# Patient Record
Sex: FEMALE | Race: WHITE | NOT HISPANIC OR LATINO | Employment: UNEMPLOYED | URBAN - METROPOLITAN AREA
[De-identification: names, ages, dates, MRNs, and addresses within clinical notes are randomized per-mention and may not be internally consistent; named-entity substitution may affect disease eponyms.]

---

## 2019-10-29 ENCOUNTER — HOSPITAL ENCOUNTER (OUTPATIENT)
Facility: HOSPITAL | Age: 49
Setting detail: OBSERVATION
Discharge: OTHER INSTITUTION | End: 2019-10-30
Attending: EMERGENCY MEDICINE | Admitting: INTERNAL MEDICINE
Payer: COMMERCIAL

## 2019-10-29 ENCOUNTER — APPOINTMENT (OUTPATIENT)
Dept: RADIOLOGY | Facility: HOSPITAL | Age: 49
End: 2019-10-29
Payer: COMMERCIAL

## 2019-10-29 DIAGNOSIS — N39.0 UTI (URINARY TRACT INFECTION): ICD-10-CM

## 2019-10-29 DIAGNOSIS — R73.9 HYPERGLYCEMIA: Primary | ICD-10-CM

## 2019-10-29 DIAGNOSIS — K70.30 ALCOHOLIC CIRRHOSIS OF LIVER WITHOUT ASCITES (HCC): ICD-10-CM

## 2019-10-29 DIAGNOSIS — E72.20 HYPERAMMONEMIA (HCC): ICD-10-CM

## 2019-10-29 PROBLEM — R82.90 ABNORMAL URINALYSIS: Status: ACTIVE | Noted: 2019-10-29

## 2019-10-29 PROBLEM — F10.10 ALCOHOL ABUSE: Status: ACTIVE | Noted: 2019-10-29

## 2019-10-29 LAB
ALBUMIN SERPL BCP-MCNC: 2.6 G/DL (ref 3.5–5)
ALP SERPL-CCNC: 112 U/L (ref 46–116)
ALT SERPL W P-5'-P-CCNC: 37 U/L (ref 12–78)
AMMONIA PLAS-SCNC: 83 UMOL/L (ref 11–35)
ANION GAP SERPL CALCULATED.3IONS-SCNC: 7 MMOL/L (ref 4–13)
APTT PPP: 35 SECONDS (ref 23–37)
AST SERPL W P-5'-P-CCNC: 52 U/L (ref 5–45)
ATRIAL RATE: 76 BPM
BACTERIA UR QL AUTO: ABNORMAL /HPF
BASE EX.OXY STD BLDV CALC-SCNC: 96.4 % (ref 60–80)
BASE EXCESS BLDV CALC-SCNC: -2.1 MMOL/L
BASOPHILS # BLD AUTO: 0.01 THOUSANDS/ΜL (ref 0–0.1)
BASOPHILS NFR BLD AUTO: 0 % (ref 0–1)
BILIRUB SERPL-MCNC: 2.06 MG/DL (ref 0.2–1)
BILIRUB UR QL STRIP: NEGATIVE
BUN SERPL-MCNC: 12 MG/DL (ref 5–25)
CALCIUM SERPL-MCNC: 9 MG/DL (ref 8.3–10.1)
CHLORIDE SERPL-SCNC: 111 MMOL/L (ref 100–108)
CLARITY UR: ABNORMAL
CO2 SERPL-SCNC: 22 MMOL/L (ref 21–32)
COLOR UR: ABNORMAL
CREAT SERPL-MCNC: 0.67 MG/DL (ref 0.6–1.3)
EOSINOPHIL # BLD AUTO: 0.32 THOUSAND/ΜL (ref 0–0.61)
EOSINOPHIL NFR BLD AUTO: 6 % (ref 0–6)
ERYTHROCYTE [DISTWIDTH] IN BLOOD BY AUTOMATED COUNT: 13.3 % (ref 11.6–15.1)
GFR SERPL CREATININE-BSD FRML MDRD: 104 ML/MIN/1.73SQ M
GLUCOSE SERPL-MCNC: 160 MG/DL (ref 65–140)
GLUCOSE SERPL-MCNC: 206 MG/DL (ref 65–140)
GLUCOSE SERPL-MCNC: 245 MG/DL (ref 65–140)
GLUCOSE SERPL-MCNC: 54 MG/DL (ref 65–140)
GLUCOSE SERPL-MCNC: 99 MG/DL (ref 65–140)
GLUCOSE UR STRIP-MCNC: ABNORMAL MG/DL
HCO3 BLDV-SCNC: 20.5 MMOL/L (ref 24–30)
HCT VFR BLD AUTO: 35.1 % (ref 34.8–46.1)
HGB BLD-MCNC: 11.9 G/DL (ref 11.5–15.4)
HGB UR QL STRIP.AUTO: NEGATIVE
HYALINE CASTS #/AREA URNS LPF: ABNORMAL /LPF
IMM GRANULOCYTES # BLD AUTO: 0.03 THOUSAND/UL (ref 0–0.2)
IMM GRANULOCYTES NFR BLD AUTO: 1 % (ref 0–2)
INR PPP: 1.56 (ref 0.84–1.19)
KETONES UR STRIP-MCNC: NEGATIVE MG/DL
LEUKOCYTE ESTERASE UR QL STRIP: ABNORMAL
LIPASE SERPL-CCNC: 218 U/L (ref 73–393)
LYMPHOCYTES # BLD AUTO: 1.29 THOUSANDS/ΜL (ref 0.6–4.47)
LYMPHOCYTES NFR BLD AUTO: 22 % (ref 14–44)
MAGNESIUM SERPL-MCNC: 1.7 MG/DL (ref 1.6–2.6)
MCH RBC QN AUTO: 38.4 PG (ref 26.8–34.3)
MCHC RBC AUTO-ENTMCNC: 33.9 G/DL (ref 31.4–37.4)
MCV RBC AUTO: 113 FL (ref 82–98)
MONOCYTES # BLD AUTO: 0.65 THOUSAND/ΜL (ref 0.17–1.22)
MONOCYTES NFR BLD AUTO: 11 % (ref 4–12)
NEUTROPHILS # BLD AUTO: 3.48 THOUSANDS/ΜL (ref 1.85–7.62)
NEUTS SEG NFR BLD AUTO: 60 % (ref 43–75)
NITRITE UR QL STRIP: POSITIVE
NON-SQ EPI CELLS URNS QL MICRO: ABNORMAL /HPF
NRBC BLD AUTO-RTO: 0 /100 WBCS
O2 CT BLDV-SCNC: 17.3 ML/DL
P AXIS: 23 DEGREES
PCO2 BLDV: 28.8 MM HG (ref 42–50)
PH BLDV: 7.47 [PH] (ref 7.3–7.4)
PH UR STRIP.AUTO: 6 [PH]
PLATELET # BLD AUTO: 92 THOUSANDS/UL (ref 149–390)
PMV BLD AUTO: 10.4 FL (ref 8.9–12.7)
PO2 BLDV: 132.5 MM HG (ref 35–45)
POTASSIUM SERPL-SCNC: 3.6 MMOL/L (ref 3.5–5.3)
PR INTERVAL: 152 MS
PROT SERPL-MCNC: 7.3 G/DL (ref 6.4–8.2)
PROT UR STRIP-MCNC: NEGATIVE MG/DL
PROTHROMBIN TIME: 18.2 SECONDS (ref 11.6–14.5)
QRS AXIS: 35 DEGREES
QRSD INTERVAL: 86 MS
QT INTERVAL: 426 MS
QTC INTERVAL: 479 MS
RBC # BLD AUTO: 3.1 MILLION/UL (ref 3.81–5.12)
RBC #/AREA URNS AUTO: ABNORMAL /HPF
SODIUM SERPL-SCNC: 140 MMOL/L (ref 136–145)
SP GR UR STRIP.AUTO: 1.02 (ref 1–1.03)
T WAVE AXIS: 36 DEGREES
UROBILINOGEN UR QL STRIP.AUTO: 0.2 E.U./DL
VENTRICULAR RATE: 76 BPM
WBC # BLD AUTO: 5.78 THOUSAND/UL (ref 4.31–10.16)
WBC #/AREA URNS AUTO: ABNORMAL /HPF

## 2019-10-29 PROCEDURE — 82948 REAGENT STRIP/BLOOD GLUCOSE: CPT

## 2019-10-29 PROCEDURE — 83690 ASSAY OF LIPASE: CPT | Performed by: EMERGENCY MEDICINE

## 2019-10-29 PROCEDURE — 93010 ELECTROCARDIOGRAM REPORT: CPT | Performed by: INTERNAL MEDICINE

## 2019-10-29 PROCEDURE — 87186 SC STD MICRODIL/AGAR DIL: CPT | Performed by: PHYSICIAN ASSISTANT

## 2019-10-29 PROCEDURE — 99285 EMERGENCY DEPT VISIT HI MDM: CPT

## 2019-10-29 PROCEDURE — 85730 THROMBOPLASTIN TIME PARTIAL: CPT | Performed by: EMERGENCY MEDICINE

## 2019-10-29 PROCEDURE — 96365 THER/PROPH/DIAG IV INF INIT: CPT

## 2019-10-29 PROCEDURE — 82805 BLOOD GASES W/O2 SATURATION: CPT | Performed by: EMERGENCY MEDICINE

## 2019-10-29 PROCEDURE — 96375 TX/PRO/DX INJ NEW DRUG ADDON: CPT

## 2019-10-29 PROCEDURE — 87077 CULTURE AEROBIC IDENTIFY: CPT | Performed by: PHYSICIAN ASSISTANT

## 2019-10-29 PROCEDURE — 81001 URINALYSIS AUTO W/SCOPE: CPT | Performed by: EMERGENCY MEDICINE

## 2019-10-29 PROCEDURE — 99285 EMERGENCY DEPT VISIT HI MDM: CPT | Performed by: EMERGENCY MEDICINE

## 2019-10-29 PROCEDURE — 87086 URINE CULTURE/COLONY COUNT: CPT | Performed by: PHYSICIAN ASSISTANT

## 2019-10-29 PROCEDURE — 36415 COLL VENOUS BLD VENIPUNCTURE: CPT | Performed by: EMERGENCY MEDICINE

## 2019-10-29 PROCEDURE — 85610 PROTHROMBIN TIME: CPT | Performed by: EMERGENCY MEDICINE

## 2019-10-29 PROCEDURE — 96366 THER/PROPH/DIAG IV INF ADDON: CPT

## 2019-10-29 PROCEDURE — 99220 PR INITIAL OBSERVATION CARE/DAY 70 MINUTES: CPT | Performed by: INTERNAL MEDICINE

## 2019-10-29 PROCEDURE — 76705 ECHO EXAM OF ABDOMEN: CPT

## 2019-10-29 PROCEDURE — 82140 ASSAY OF AMMONIA: CPT | Performed by: EMERGENCY MEDICINE

## 2019-10-29 PROCEDURE — 85025 COMPLETE CBC W/AUTO DIFF WBC: CPT | Performed by: EMERGENCY MEDICINE

## 2019-10-29 PROCEDURE — 83735 ASSAY OF MAGNESIUM: CPT | Performed by: EMERGENCY MEDICINE

## 2019-10-29 PROCEDURE — 80053 COMPREHEN METABOLIC PANEL: CPT | Performed by: EMERGENCY MEDICINE

## 2019-10-29 PROCEDURE — 93005 ELECTROCARDIOGRAM TRACING: CPT

## 2019-10-29 RX ORDER — CALCIUM CARBONATE 200(500)MG
1000 TABLET,CHEWABLE ORAL DAILY PRN
Status: DISCONTINUED | OUTPATIENT
Start: 2019-10-29 | End: 2019-10-30 | Stop reason: HOSPADM

## 2019-10-29 RX ORDER — FUROSEMIDE 20 MG/1
60 TABLET ORAL 2 TIMES DAILY
COMMUNITY
Start: 2019-08-27 | End: 2021-03-09 | Stop reason: HOSPADM

## 2019-10-29 RX ORDER — SODIUM CHLORIDE, SODIUM GLUCONATE, SODIUM ACETATE, POTASSIUM CHLORIDE, MAGNESIUM CHLORIDE, SODIUM PHOSPHATE, DIBASIC, AND POTASSIUM PHOSPHATE .53; .5; .37; .037; .03; .012; .00082 G/100ML; G/100ML; G/100ML; G/100ML; G/100ML; G/100ML; G/100ML
1000 INJECTION, SOLUTION INTRAVENOUS ONCE
Status: COMPLETED | OUTPATIENT
Start: 2019-10-29 | End: 2019-10-29

## 2019-10-29 RX ORDER — LANOLIN ALCOHOL/MO/W.PET/CERES
400 CREAM (GRAM) TOPICAL DAILY
Status: DISCONTINUED | OUTPATIENT
Start: 2019-10-30 | End: 2019-10-30 | Stop reason: HOSPADM

## 2019-10-29 RX ORDER — FERROUS SULFATE 325(65) MG
325 TABLET ORAL
Status: DISCONTINUED | OUTPATIENT
Start: 2019-10-30 | End: 2019-10-30 | Stop reason: HOSPADM

## 2019-10-29 RX ORDER — LANOLIN ALCOHOL/MO/W.PET/CERES
100 CREAM (GRAM) TOPICAL DAILY
COMMUNITY

## 2019-10-29 RX ORDER — THIAMINE MONONITRATE (VIT B1) 100 MG
100 TABLET ORAL DAILY
Status: DISCONTINUED | OUTPATIENT
Start: 2019-10-30 | End: 2019-10-30 | Stop reason: HOSPADM

## 2019-10-29 RX ORDER — FOLIC ACID 1 MG/1
400 TABLET ORAL DAILY
COMMUNITY

## 2019-10-29 RX ORDER — MIRTAZAPINE 30 MG/1
30 TABLET, FILM COATED ORAL
COMMUNITY
Start: 2019-09-25 | End: 2021-04-28

## 2019-10-29 RX ORDER — METHOCARBAMOL 500 MG/1
500 TABLET, FILM COATED ORAL
Status: DISCONTINUED | OUTPATIENT
Start: 2019-10-29 | End: 2019-10-30 | Stop reason: HOSPADM

## 2019-10-29 RX ORDER — LACTULOSE 20 G/30ML
30 SOLUTION ORAL ONCE
Status: COMPLETED | OUTPATIENT
Start: 2019-10-29 | End: 2019-10-29

## 2019-10-29 RX ORDER — METHOCARBAMOL 500 MG/1
750 TABLET, FILM COATED ORAL
COMMUNITY

## 2019-10-29 RX ORDER — SENNOSIDES 8.6 MG
1 TABLET ORAL DAILY
Status: DISCONTINUED | OUTPATIENT
Start: 2019-10-30 | End: 2019-10-30 | Stop reason: HOSPADM

## 2019-10-29 RX ORDER — MIRTAZAPINE 30 MG/1
30 TABLET, FILM COATED ORAL
Status: DISCONTINUED | OUTPATIENT
Start: 2019-10-29 | End: 2019-10-30 | Stop reason: HOSPADM

## 2019-10-29 RX ORDER — FUROSEMIDE 40 MG/1
40 TABLET ORAL DAILY
Status: DISCONTINUED | OUTPATIENT
Start: 2019-10-30 | End: 2019-10-30 | Stop reason: HOSPADM

## 2019-10-29 RX ORDER — FERROUS SULFATE 325(65) MG
325 TABLET ORAL
COMMUNITY

## 2019-10-29 RX ORDER — ONDANSETRON 2 MG/ML
4 INJECTION INTRAMUSCULAR; INTRAVENOUS EVERY 6 HOURS PRN
Status: DISCONTINUED | OUTPATIENT
Start: 2019-10-29 | End: 2019-10-30 | Stop reason: HOSPADM

## 2019-10-29 RX ORDER — SPIRONOLACTONE 25 MG/1
25 TABLET ORAL DAILY
Status: DISCONTINUED | OUTPATIENT
Start: 2019-10-30 | End: 2019-10-30 | Stop reason: HOSPADM

## 2019-10-29 RX ORDER — SPIRONOLACTONE 25 MG/1
150 TABLET ORAL DAILY
COMMUNITY
Start: 2019-08-27 | End: 2021-03-09 | Stop reason: HOSPADM

## 2019-10-29 RX ADMIN — METHOCARBAMOL TABLETS 500 MG: 500 TABLET, COATED ORAL at 22:27

## 2019-10-29 RX ADMIN — CEFTRIAXONE SODIUM 1000 MG: 10 INJECTION, POWDER, FOR SOLUTION INTRAVENOUS at 14:45

## 2019-10-29 RX ADMIN — SODIUM CHLORIDE, SODIUM GLUCONATE, SODIUM ACETATE, POTASSIUM CHLORIDE AND MAGNESIUM CHLORIDE 1000 ML: 526; 502; 368; 37; 30 INJECTION, SOLUTION INTRAVENOUS at 12:41

## 2019-10-29 RX ADMIN — LACTULOSE 30 G: 10 SOLUTION ORAL at 15:17

## 2019-10-29 RX ADMIN — MIRTAZAPINE 30 MG: 30 TABLET, FILM COATED ORAL at 22:24

## 2019-10-29 NOTE — ED ATTENDING ATTESTATION
10/29/2019  IZamzam MD, saw and evaluated the patient  I have discussed the patient with the resident/non-physician practitioner and agree with the resident's/non-physician practitioner's findings, Plan of Care, and MDM as documented in the resident's/non-physician practitioner's note, except where noted  All available labs and Radiology studies were reviewed  I was present for key portions of any procedure(s) performed by the resident/non-physician practitioner and I was immediately available to provide assistance  At this point I agree with the current assessment done in the Emergency Department  I have conducted an independent evaluation of this patient a history and physical is as follows: This is a 51 y o  old female who presents to the ED for evaluation of high blood sugar  [place]  Has some HA, flashes in vision and lightheadedness  Hx chronic alcohol use, multiple pints per day, with cirrhosis  VS and nursing notes reviewed  General: Appears in NAD, awake, alert, speaking normally in full sentences  Well-nourished, well-developed  Appears stated age  Head: Normocephalic, atraumatic  Eyes: EOMI  Vision grossly normal  No subconjunctival hemorrhages or occular discharge noted  Symmetrical lids  ENT: Atraumatic external nose and ears  No stridor  Normal phonation  No drooling  Normal swallowing  Neck: No JVD  FROM  No goiter  CV: No pallor  Normal rate  Lungs: No tachypnea  No respiratory distress  MSK: Moving all extremities equally, no peripheral edema  Skin: Dry, intact  No cyanosis  Neuro: Awake, alert, GCS15  CN II-XII grossly intact  Grossly normal gait  Psychiatric/Behavioral: Appropriate mood and affect  A/P: This is a 51 y o  female who presents to the ED for evaluation of hyperglycemia, dizziness  Will get labs, IVF, reevaluate      ED Course       Critical Care Time  Procedures

## 2019-10-29 NOTE — PLAN OF CARE
Problem: PAIN - ADULT  Goal: Verbalizes/displays adequate comfort level or baseline comfort level  Description  Interventions:  - Encourage patient to monitor pain and request assistance  - Assess pain using appropriate pain scale  - Administer analgesics based on type and severity of pain and evaluate response  - Implement non-pharmacological measures as appropriate and evaluate response  - Consider cultural and social influences on pain and pain management  - Notify physician/advanced practitioner if interventions unsuccessful or patient reports new pain  Outcome: Progressing     Problem: INFECTION - ADULT  Goal: Absence or prevention of progression during hospitalization  Description  INTERVENTIONS:  - Assess and monitor for signs and symptoms of infection  - Monitor lab/diagnostic results  - Monitor all insertion sites, i e  indwelling lines, tubes, and drains  - Monitor endotracheal if appropriate and nasal secretions for changes in amount and color  - [place] appropriate cooling/warming therapies per order  - Administer medications as ordered  - Instruct and encourage patient and family to use good hand hygiene technique  - Identify and instruct in appropriate isolation precautions for identified infection/condition  Outcome: Progressing  Goal: Absence of fever/infection during neutropenic period  Description  INTERVENTIONS:  - Monitor WBC    Outcome: Progressing     Problem: SAFETY ADULT  Goal: Patient will remain free of falls  Description  INTERVENTIONS:  - Assess patient frequently for physical needs  -  Identify cognitive and physical deficits and behaviors that affect risk of falls    -  [place] fall precautions as indicated by assessment   - Educate patient/family on patient safety including physical limitations  - Instruct patient to call for assistance with activity based on assessment  - Modify environment to reduce risk of injury  - Consider OT/PT consult to assist with strengthening/mobility  Outcome: Progressing  Goal: Maintain or return to baseline ADL function  Description  INTERVENTIONS:  -  Assess patient's ability to carry out ADLs; assess patient's baseline for ADL function and identify physical deficits which impact ability to perform ADLs (bathing, care of mouth/teeth, toileting, grooming, dressing, etc )  - Assess/evaluate cause of self-care deficits   - Assess range of motion  - Assess patient's mobility; develop plan if impaired  - Assess patient's need for assistive devices and provide as appropriate  - Encourage maximum independence but intervene and supervise when necessary  - Involve family in performance of ADLs  - Assess for home care needs following discharge   - Consider OT consult to assist with ADL evaluation and planning for discharge  - Provide patient education as appropriate  Outcome: Progressing  Goal: Maintain or return mobility status to optimal level  Description  INTERVENTIONS:  - Assess patient's baseline mobility status (ambulation, transfers, stairs, etc )    - Identify cognitive and physical deficits and behaviors that affect mobility  - Identify mobility aids required to assist with transfers and/or ambulation (gait belt, sit-to-stand, lift, walker, cane, etc )  - [place] fall precautions as indicated by assessment  - Record patient progress and toleration of activity level on Mobility SBAR; progress patient to next Phase/Stage  - Instruct patient to call for assistance with activity based on assessment  - Consider rehabilitation consult to assist with strengthening/weightbearing, etc   Outcome: Progressing     Problem: DISCHARGE PLANNING  Goal: Discharge to home or other facility with appropriate resources  Description  INTERVENTIONS:  - Identify barriers to discharge w/patient and caregiver  - Arrange for needed discharge resources and transportation as appropriate  - Identify discharge learning needs (meds, wound care, etc )  - Arrange for interpretive services to assist at discharge as needed  - Refer to Case Management Department for coordinating discharge planning if the patient needs post-hospital services based on physician/advanced practitioner order or complex needs related to functional status, cognitive ability, or social support system  Outcome: Progressing     Problem: Knowledge Deficit  Goal: Patient/family/caregiver demonstrates understanding of disease process, treatment plan, medications, and discharge instructions  Description  Complete learning assessment and assess knowledge base    Interventions:  - Provide teaching at level of understanding  - Provide teaching via preferred learning methods  Outcome: Progressing

## 2019-10-29 NOTE — ED PROVIDER NOTES
History  Chief Complaint   Patient presents with    Hyperglycemia - Symptomatic     pt is coming from [facility] where she is 28days sober from ETOH  to cope with not drinking she has been eating a lot of high sugar foods  this am the pt felt fatigue and lightheaded, near syncope  staff checked her sugar and it was 372 at 0830  no hx of DM    Fatigue     50-year-old woman with past medical history of alcoholic cirrhosis presents for evaluation of hyperglycemia  Patient is currently residing in an alcohol rehab  She was found to be hyper for glycemic by routine fingerstick checked by staff  Her sugar was as high as 372  Patient is asymptomatic exception of fatigue  She states she has been eating more sweets and junk food since she stopped drinking  She denies prior history of diabetes  She denies fever, chills, nausea, vomiting, diarrhea, dysuria, urinary frequency, urgency  She still complains of intermittent in unsteadiness when ambulating in turning her head  She has no symptoms of dizziness or vertigo  She denies chest pain, abdominal pain, dyspnea  Prior to Admission Medications   Prescriptions Last Dose Informant Patient Reported?  Taking?   ferrous sulfate 325 (65 Fe) mg tablet   Yes Yes   Sig: Take 325 mg by mouth daily with breakfast   folic acid (FOLVITE) 1 mg tablet   Yes Yes   Sig: Take 400 mcg by mouth daily   furosemide (LASIX) 20 mg tablet   Yes Yes   Sig: Take 40 mg by mouth daily   methocarbamol (ROBAXIN) 500 mg tablet   Yes Yes   Sig: Take 500 mg by mouth daily at bedtime as needed for muscle spasms   mirtazapine (REMERON) 30 mg tablet   Yes Yes   Sig: Take 30 mg by mouth daily at bedtime   spironolactone (ALDACTONE) 25 mg tablet   Yes Yes   Sig: Take 25 mg by mouth daily   thiamine 100 MG tablet   Yes Yes   Sig: Take 100 mg by mouth daily      Facility-Administered Medications: None       Past Medical History:   Diagnosis Date    Alcohol abuse     Cirrhosis ([facility] [address] )     Hypertension        History reviewed  No pertinent surgical history  History reviewed  No pertinent family history  I have reviewed and agree with the history as documented  Social History     Tobacco Use    Smoking status: Current Every Day Smoker     Types: Cigarettes    Smokeless tobacco: Never Used   Substance Use Topics    Alcohol use: Not Currently     Comment: currently 28 days sober    Drug use: Never        Review of Systems   Constitutional: Positive for fatigue  Negative for appetite change, chills, diaphoresis and fever  HENT: Negative for congestion, rhinorrhea and sore throat  Respiratory: Negative for cough, shortness of breath, wheezing and stridor  Cardiovascular: Negative for chest pain, palpitations and leg swelling  Gastrointestinal: Negative for abdominal distention, abdominal pain, constipation, diarrhea, nausea and vomiting  Endocrine: Negative for polydipsia and polyuria  Genitourinary: Negative for dysuria and hematuria  Musculoskeletal: Negative for back pain, neck pain and neck stiffness  Skin: Negative for pallor, rash and wound  Neurological: Positive for dizziness  Negative for light-headedness and headaches  Psychiatric/Behavioral: Negative for behavioral problems and confusion  All other systems reviewed and are negative        Physical Exam  ED Triage Vitals   Temperature Pulse Respirations Blood Pressure SpO2   10/29/19 1037 10/29/19 1037 10/29/19 1037 10/29/19 1037 10/29/19 1037   98 5 °F (36 9 °C) 81 18 165/83 97 %      Temp Source Heart Rate Source Patient Position - Orthostatic VS BP Location FiO2 (%)   10/29/19 1037 10/29/19 1203 10/29/19 1203 10/29/19 1203 --   Oral Monitor Sitting Right arm       Pain Score       10/29/19 1037       No Pain             Orthostatic Vital Signs  Vitals:    10/29/19 2358 10/30/19 0728 10/30/19 0729 10/30/19 1521   BP: 130/83 129/83 129/83 129/82   Pulse: 85 82 83 81   Patient Position - Orthostatic VS: Physical Exam   Constitutional: She is oriented to person, place, and time  She appears well-developed and well-nourished  No distress  HENT:   Head: Normocephalic and atraumatic  Eyes: Pupils are equal, round, and reactive to light  Conjunctivae and EOM are normal  No scleral icterus  Two beats of stagnant with rightward gaze   Neck: Normal range of motion  Neck supple  Cardiovascular: Normal rate, regular rhythm, normal heart sounds and intact distal pulses  Exam reveals no gallop and no friction rub  No murmur heard  Pulmonary/Chest: Effort normal and breath sounds normal  No respiratory distress  She has no wheezes  Abdominal: Soft  Bowel sounds are normal  She exhibits distension  She exhibits no mass  There is no tenderness  There is no rebound and no guarding  Ascites  No ecchymoses, erythema, increased warmth of abdomen   Musculoskeletal: Normal range of motion  She exhibits no edema, tenderness or deformity  Neurological: She is alert and oriented to person, place, and time  Skin: Skin is warm and dry  No rash noted  She is not diaphoretic  No erythema  No pallor  jaundice   Psychiatric: She has a normal mood and affect  Her behavior is normal    Nursing note and vitals reviewed        ED Medications  Medications   multi-electrolyte (ISOLYTE-S PH 7 4) bolus 1,000 mL (0 mL Intravenous Stopped 10/29/19 1436)   ceftriaxone (ROCEPHIN) 1 g/50 mL in dextrose IVPB (0 mg Intravenous Stopped 10/29/19 1518)   lactulose 20 g/30 mL oral solution 30 g (30 g Oral Given 10/29/19 1517)       Diagnostic Studies  Results Reviewed     Procedure Component Value Units Date/Time    Urine culture [[id number]]  (Abnormal)  (Susceptibility) Collected:  10/29/19 1241    Lab Status:  Final result Specimen:  Urine, Clean Catch Updated:  10/31/19 4732     Urine Culture >100,000 cfu/ml Escherichia coli    Susceptibility     Escherichia coli (1)     Antibiotic Interpretation Microscan Method Status    ZID Performed  Yes  CESAR Final    Amikacin ($$) Susceptible <=16 ug/ml CESAR Final    Amoxicillin + Clavulanate Intermediate 16/8 ug/ml CESAR Final    Ampicillin ($$) Resistant >16 00 ug/ml CESAR Final    Ampicillin + Sulbactam ($) Resistant >16/8 ug/ml CESAR Final    Aztreonam ($$$)  Susceptible <=4 ug/ml CESAR Final    Cefazolin ($) Resistant >16 00 ug/ml CESAR Final    Cefuroxime ($$) Susceptible <=4 ug/ml CESAR Final    Ciprofloxacin ($)  Susceptible <=1 00 ug/ml CESAR Final    Ertapenem ($$$) Susceptible <=0 5 ug/ml CESAR Final    Gentamicin ($$) Resistant >8 ug/ml CESAR Final    Levofloxacin ($) Susceptible <=0 25 ug/ml CESAR Final    Tetracycline Resistant >8 ug/ml CESAR Final    Tobramycin ($) Susceptible 4 ug/ml CESAR Final    Trimethoprim + Sulfamethoxazole ($$$) Resistant >2/38 ug/ml CESAR Final                   Fingerstick Glucose (POCT) [[id number]]  (Normal) Collected:  10/29/19 1544    Lab Status:  Final result Updated:  10/29/19 1546     POC Glucose 99 mg/dl     Fingerstick Glucose (POCT) [[id number]]  (Abnormal) Collected:  10/29/19 1511    Lab Status:  Final result Updated:  10/29/19 1516     POC Glucose 54 mg/dl     CBC and differential [[id number]]  (Abnormal) Collected:  10/29/19 1238    Lab Status:  Final result Specimen:  Blood from Arm, Left Updated:  10/29/19 1323     WBC 5 78 Thousand/uL      RBC 3 10 Million/uL      Hemoglobin 11 9 g/dL      Hematocrit 35 1 %       fL      MCH 38 4 pg      MCHC 33 9 g/dL      RDW 13 3 %      MPV 10 4 fL      Platelets 92 Thousands/uL      nRBC 0 /100 WBCs      Neutrophils Relative 60 %      Immat GRANS % 1 %      Lymphocytes Relative 22 %      Monocytes Relative 11 %      Eosinophils Relative 6 %      Basophils Relative 0 %      Neutrophils Absolute 3 48 Thousands/µL      Immature Grans Absolute 0 03 Thousand/uL      Lymphocytes Absolute 1 29 Thousands/µL      Monocytes Absolute 0 65 Thousand/µL      Eosinophils Absolute 0 32 Thousand/µL      Basophils Absolute 0 01 Thousands/µL Urine Microscopic [[id number]]  (Abnormal) Collected:  10/29/19 1241    Lab Status:  Final result Specimen:  Urine, Clean Catch Updated:  10/29/19 1315     RBC, UA 4-10 /hpf      WBC, UA Innumerable /hpf      Epithelial Cells None Seen /hpf      Bacteria, UA Innumerable /hpf      Hyaline Casts, UA 3-5 /lpf     Comprehensive metabolic panel [[id number]]  (Abnormal) Collected:  10/29/19 1237    Lab Status:  Final result Specimen:  Blood from Arm, Left Updated:  10/29/19 1306     Sodium 140 mmol/L      Potassium 3 6 mmol/L      Chloride 111 mmol/L      CO2 22 mmol/L      ANION GAP 7 mmol/L      BUN 12 mg/dL      Creatinine 0 67 mg/dL      Glucose 206 mg/dL      Calcium 9 0 mg/dL      AST 52 U/L      ALT 37 U/L      Alkaline Phosphatase 112 U/L      Total Protein 7 3 g/dL      Albumin 2 6 g/dL      Total Bilirubin 2 06 mg/dL      eGFR 104 ml/min/1 73sq m     Narrative:       [place] guidelines for Chronic Kidney Disease (CKD):     Stage 1 with normal or high GFR (GFR > 90 mL/min/1 73 square meters)    Stage 2 Mild CKD (GFR = 60-89 mL/min/1 73 square meters)    Stage 3A Moderate CKD (GFR = 45-59 mL/min/1 73 square meters)    Stage 3B Moderate CKD (GFR = 30-44 mL/min/1 73 square meters)    Stage 4 Severe CKD (GFR = 15-29 mL/min/1 73 square meters)    Stage 5 End Stage CKD (GFR <15 mL/min/1 73 square meters)  Note: GFR calculation is accurate only with a steady state creatinine    Lipase [[id number]]  (Normal) Collected:  10/29/19 1237    Lab Status:  Final result Specimen:  Blood from Arm, Left Updated:  10/29/19 1306     Lipase 218 u/L     Magnesium [[id number]]  (Normal) Collected:  10/29/19 1237    Lab Status:  Final result Specimen:  Blood from Arm, Left Updated:  10/29/19 1306     Magnesium 1 7 mg/dL     Ammonia [[id number]]  (Abnormal) Collected:  10/29/19 1238    Lab Status:  Final result Specimen:  Blood from Arm, Left Updated:  10/29/19 1303     Ammonia 83 umol/L     UA w Reflex to Microscopic [[id number]]  (Abnormal) Collected:  10/29/19 1241    Lab Status:  Final result Specimen:  Urine, Clean Catch Updated:  10/29/19 1302     Color, UA Dk Yellow     Clarity, UA Cloudy     Specific Gravity, UA 1 025     pH, UA 6 0     Leukocytes, UA Small     Nitrite, UA Positive     Protein, UA Negative mg/dl      Glucose, UA >=1000 (1%) mg/dl      Ketones, UA Negative mg/dl      Urobilinogen, UA 0 2 E U /dl      Bilirubin, UA Negative     Blood, UA Negative    Protime-INR [[id number]]  (Abnormal) Collected:  10/29/19 1237    Lab Status:  Final result Specimen:  Blood from Arm, Left Updated:  10/29/19 1301     Protime 18 2 seconds      INR 1 56    APTT [[id number]]  (Normal) Collected:  10/29/19 1237    Lab Status:  Final result Specimen:  Blood from Arm, Left Updated:  10/29/19 1301     PTT 35 seconds     Blood gas, venous [[id number]]  (Abnormal) Collected:  10/29/19 1238    Lab Status:  Final result Specimen:  Blood from Arm, Left Updated:  10/29/19 1245     pH, Mika 7 470     pCO2, Mika 28 8 mm Hg      pO2, Mika 132 5 mm Hg      HCO3, Mika 20 5 mmol/L      Base Excess, Mika -2 1 mmol/L      O2 Content, Mika 17 3 ml/dL      O2 HGB, VENOUS 96 4 %     Fingerstick Glucose (POCT) [[id number]]  (Abnormal) Collected:  10/29/19 1201    Lab Status:  Final result Updated:  10/29/19 1203     POC Glucose 245 mg/dl                  US abdomen limited   Final Result by Darlyn Lutz MD (10/30 1238)      No evidence of ascites  Heterogeneous liver could be related to intrinsic liver disease as suggested by the history  Possible recannulated umbilical vein which can be associated with portal venous hypertension              Workstation performed: [id number]               Procedures  ECG 12 Lead Documentation Only  Date/Time: 10/29/2019 1:55 PM  Performed by: John Martinez MD  Authorized by: John Martinez MD     Indications / Diagnosis:  Dizziness  ECG reviewed by me, the ED Provider: yes    Patient location:  ED  Interpretation:     Interpretation: normal    Rate:     ECG rate:  76    ECG rate assessment: normal    Rhythm:     Rhythm: sinus rhythm    Ectopy:     Ectopy: none    QRS:     QRS axis:  Normal    QRS intervals:  Normal  Conduction:     Conduction: normal    ST segments:     ST segments:  Normal            ED Course  ED Course as of Oct 31 1010   Tue Oct 29, 2019   1547 POC Glucose: 99   1547 POC Glucose: 99                               MDM  Number of Diagnoses or Management Options  Hyperammonemia ([address] ): new and requires workup  Hyperglycemia: new and requires workup  UTI (urinary tract infection): new and requires workup  Diagnosis management comments: 52 yo woman with a omhx of etoh cirrhosis presents with hyperglycemia  Pt also complains of intermittent lightheadedness  Jaundice & abd distension w/o tenderness on exam  Initial BG >200  Will check cbc, metabolic panel, lfts, ammonia, ua, cardiac w/u  Reassess  Labs show hyperammonemia  Likely etiology of dizziness, fatigue  Patient had an episode of hypoglycemia likely 2/2 decreased po intake during workup in the setting of liver failure  This resolved with juice & small meal  Will start pt on lactulose, rocephin  Admit to SLIM                 Amount and/or Complexity of Data Reviewed  Clinical lab tests: ordered and reviewed  Tests in the radiology section of CPT®: ordered and reviewed  Decide to obtain previous medical records or to obtain history from someone other than the patient: yes  Obtain history from someone other than the patient: yes  Review and summarize past medical records: yes  Discuss the patient with other providers: yes  Independent visualization of images, tracings, or specimens: yes    Risk of Complications, Morbidity, and/or Mortality  Presenting problems: moderate  Diagnostic procedures: low  Management options: low    Patient Progress  Patient progress: stable      Disposition  Final diagnoses:   Hyperglycemia Hyperammonemia ([facility] [address] )   UTI (urinary tract infection)     Time reflects when diagnosis was documented in both MDM as applicable and the Disposition within this note     Time User Action Codes Description Comment    10/29/2019  2:46 PM Nayana Donahue Add [R73 9] Hyperglycemia     10/29/2019  2:47 PM Cavanaugh, [address] [place] [address] [E72 20] Hyperammonemia ([facility] [address] )     10/29/2019  2:49 PM Chelo Cavanaugh Add [N39 0] UTI (urinary tract infection)     10/30/2019  2:54 PM Jono Apgar Add [C61 69] Alcoholic cirrhosis of liver without ascites [facility])       ED Disposition     ED Disposition Condition Date/Time Comment    Admit Stable Tue Oct 29, 2019  2:47 PM Case was discussed with fernie and the patient's admission status was agreed to be Admission Status: observation status to the service of Dr Mata Daily          Follow-up Information     Follow up With Specialties Details Why [address] [place] [address],  Family Medicine Schedule an appointment as soon as possible for a visit in 2 week(s)  [address]  [phone]            Discharge Medication List as of 10/30/2019  3:23 PM      START taking these medications    Details   cephalexin (KEFLEX) 500 mg capsule Take 1 capsule (500 mg total) by mouth every 8 (eight) hours for 8 doses, Starting Wed 10/30/2019, Until Sat 2019, Print      lactulose (CEPHULAC) 20 g packet Take 1 packet (20 g total) by mouth daily, Starting Wed 10/30/2019, Print         CONTINUE these medications which have NOT CHANGED    Details   ferrous sulfate 325 (65 Fe) mg tablet Take 325 mg by mouth daily with breakfast, Historical Med      folic acid (FOLVITE) 1 mg tablet Take 400 mcg by mouth daily, Historical Med      furosemide (LASIX) 20 mg tablet Take 40 mg by mouth daily, Starting 2019, Until 2020, Historical Med      methocarbamol (ROBAXIN) 500 mg tablet Take 500 mg by mouth daily at bedtime as needed for muscle spasms, Historical Med mirtazapine (REMERON) 30 mg tablet Take 30 mg by mouth daily at bedtime, Starting Wed 9/25/2019, Historical Med      spironolactone (ALDACTONE) 25 mg tablet Take 25 mg by mouth daily, Starting Tue 8/27/2019, Until Wed 8/26/2020, Historical Med      thiamine 100 MG tablet Take 100 mg by mouth daily, Historical Med           Outpatient Discharge Orders   Discharge Diet     Discharge Condtion:  Stabilized     Patient Aware of Diagnosis: Yes     Free of Communicable Disease:   Yes       ED Provider  Attending physically available and evaluated Karne Steele  CALLIE managed the patient along with the ED Attending      Electronically Signed by         Frank Bean MD  10/31/19 9813

## 2019-10-29 NOTE — H&P
[address] [facility]  H&P- Jennifer Miranda 1970, 51 y o  female MRN: [id number]    Unit/Bed#: [facility] 822-01 Encounter: [id number]    Primary Care Provider: Ulises Yoder DO   Date and time admitted to hospital: 10/29/2019 10:27 AM      * Hyperglycemia  Assessment & Plan  · Pt sent from [facility] with hyperglycemia glucose in 300s  Here in 200s in ED and most recent glucose hypoglycemic in 50s  · Pt reports she has been eating sweets while in rehab and put sugar on her cereal this morning  · Check Hgb A1c  · Accu checks QID    Abnormal urinalysis  Assessment & Plan  · Abnormal UA on admission and given IV ceftriaxone in ED  · Urine culture is pending  · Pt is asymptomatic, denies dysuria, hematuria  Has frequency however chronic; she is on diuretics  · Afebrile, no leukocytosis  Monitor off any further antibiotic     Alcohol abuse  Assessment & Plan  · Pt with hx alcohol abuse and cirrhosis  Currently at [facility] run rehab  · CM consult    Alcoholic cirrhosis [facility])  Assessment & Plan  · Continue aldactone  · Ammonia elevated at 83 - given lactulose in ED  · Check abdominal US as pt notes increasing distention evaluate for ascites  She notes she has had a paracentesis in the past  · F/u GI outpatient - she has an appt next week reportedly       VTE Prophylaxis: low risk ambulate pt  / reason for no mechanical VTE prophylaxis ambulate pt   Code Status: level 1 full  POLST: There is no POLST form on file for this patient (pre-hospital)  Discussion with family: pt    Anticipated Length of Stay:  Patient will be admitted on an Observation basis with an anticipated length of stay of  Less than 2 midnights  Justification for Hospital Stay: hyperglycemia    Total Time for Visit, including Counseling / Coordination of Care: 45 minutes  Greater than 50% of this total time spent on direct patient counseling and coordination of care      Chief Complaint:   hypglycemia    History of Present Illness:    Josh Moore is a 51 y o  female with PMH alcoholic cirrhosis, lymphedema, anxiety, who presents with hyperglycemia  She is currently at [facility] for alcohol rehab  She notes she has been eating foods high in sugar and had cereal with sugar sprinkled on top of it this morning  She began to feel unwell with shakes, visual changes, her blood sugar was checked at [facility] and found to be in 300s so she was sent to ED for evaluation  She was found to have glucose in 200s initially here  After several hours, she began to feel diaphoretic, shaky and her glucose was in the 50s in the ED  She was given apple juice and crackers with improvement of symptoms  She has no known history of diabetes and no significant family history  She has never had a problem with her blood sugars before  She reports her abdomen has felt more full over the last few days  She notes she has been eating salty foods as well  She denies any sob, n/v, abdominal pain, sob, fever, chills, dysuria, hematuria  She endorses urinary frequency but states this is typical for her as she is on diuretics  Review of Systems:    Review of Systems   Constitutional: Positive for diaphoresis  Negative for activity change, appetite change, chills, fatigue and fever  HENT: Negative  Eyes: Positive for visual disturbance  Respiratory: Negative for cough and shortness of breath  Cardiovascular: Positive for leg swelling (chronic)  Negative for chest pain  Gastrointestinal: Positive for abdominal distention and constipation  Negative for abdominal pain, nausea and vomiting  Genitourinary: Positive for frequency  Negative for difficulty urinating, dysuria and hematuria  Musculoskeletal: Negative  Skin: Negative  Allergic/Immunologic: Negative  Neurological: Negative  Psychiatric/Behavioral: Negative          Past Medical and Surgical History:     Past Medical History:   Diagnosis Date    Alcohol abuse     Cirrhosis ([facility] [address] )     Hypertension        History reviewed  No pertinent surgical history  Meds/Allergies:    Prior to Admission medications    Medication Sig Start Date End Date Taking? Authorizing Provider   ferrous sulfate 325 (65 Fe) mg tablet Take 325 mg by mouth daily with breakfast   Yes Historical Provider, MD   folic acid (FOLVITE) 1 mg tablet Take 400 mcg by mouth daily   Yes Historical Provider, MD   furosemide (LASIX) 20 mg tablet Take 40 mg by mouth daily 8/27/19 8/26/20 Yes Historical Provider, MD   methocarbamol (ROBAXIN) 500 mg tablet Take 500 mg by mouth daily at bedtime as needed for muscle spasms   Yes Historical Provider, MD   mirtazapine (REMERON) 30 mg tablet Take 30 mg by mouth daily at bedtime 9/25/19  Yes Historical Provider, MD   spironolactone (ALDACTONE) 25 mg tablet Take 25 mg by mouth daily 8/27/19 8/26/20 Yes Historical Provider, MD   thiamine 100 MG tablet Take 100 mg by mouth daily   Yes Historical Provider, MD     I have reviewed home medications with patient personally  Allergies: No Known Allergies    Social History:     Marital Status: Single   Occupation:   Patient Pre-hospital Living Situation: currently at [employer]  Patient Pre-hospital Level of Mobility: independent  Patient Pre-hospital Diet Restrictions: none  Substance Use History:   Social History     Substance and Sexual Activity   Alcohol Use Not Currently    Comment: currently 28 days sober     Social History     Tobacco Use   Smoking Status Current Every Day Smoker    Types: Cigarettes   Smokeless Tobacco Never Used     Social History     Substance and Sexual Activity   Drug Use Never       Family History:    History reviewed  No pertinent family history      Physical Exam:     Vitals:   Blood Pressure: 154/90 (10/29/19 1658)  Pulse: 80 (10/29/19 1658)  Temperature: 98 °F (36 7 °C) (10/29/19 1658)  Temp Source: Oral (10/29/19 1037)  Respirations: 16 (10/29/19 1658)  Height: 5' 4" (162 6 cm) (10/29/19 1658)  Weight - Scale: 86 2 kg (190 lb) (10/29/19 1037)  SpO2: 98 % (10/29/19 1658)    Physical Exam   Constitutional: She is oriented to person, place, and time  No distress  HENT:   Head: Normocephalic and atraumatic  Eyes: EOM are normal  No scleral icterus  Cardiovascular: Normal rate and regular rhythm  Pulmonary/Chest: Effort normal and breath sounds normal  No respiratory distress  Abdominal: Soft  Bowel sounds are normal  She exhibits distension  There is no tenderness  Musculoskeletal: Normal range of motion  She exhibits edema (trace pedal edema b/l)  Neurological: She is alert and oriented to person, place, and time  No cranial nerve deficit  Skin: Skin is warm and dry  She is not diaphoretic  Psychiatric: She has a normal mood and affect  Her behavior is normal    Vitals reviewed  Additional Data:     Lab Results: I have personally reviewed pertinent reports  Results from last 7 days   Lab Units 10/29/19  1238   WBC Thousand/uL 5 78   HEMOGLOBIN g/dL 11 9   HEMATOCRIT % 35 1   PLATELETS Thousands/uL 92*   NEUTROS PCT % 60   LYMPHS PCT % 22   MONOS PCT % 11   EOS PCT % 6     Results from last 7 days   Lab Units 10/29/19  1237   SODIUM mmol/L 140   POTASSIUM mmol/L 3 6   CHLORIDE mmol/L 111*   CO2 mmol/L 22   BUN mg/dL 12   CREATININE mg/dL 0 67   ANION GAP mmol/L 7   CALCIUM mg/dL 9 0   ALBUMIN g/dL 2 6*   TOTAL BILIRUBIN mg/dL 2 06*   ALK PHOS U/L 112   ALT U/L 37   AST U/L 52*   GLUCOSE RANDOM mg/dL 206*     Results from last 7 days   Lab Units 10/29/19  1237   INR  1 56*     Results from last 7 days   Lab Units 10/29/19  1544 10/29/19  1511 10/29/19  1201   POC GLUCOSE mg/dl 99 54* 245*               Imaging: I have personally reviewed pertinent reports        US abdomen complete    (Results Pending)       EKG, Pathology, and Other Studies Reviewed on Admission:   · EKG:     Allscripts / Epic Records Reviewed: Yes     ** Please Note: This note has been constructed using a voice recognition system   **

## 2019-10-29 NOTE — ASSESSMENT & PLAN NOTE
· Continue aldactone  · Ammonia elevated at 83 - given lactulose in ED  · Check abdominal US as pt notes increasing distention evaluate for ascites    She notes she has had a paracentesis in the past  · F/u GI outpatient - she has an appt next week reportedly

## 2019-10-29 NOTE — PROGRESS NOTES
Patient arrived from ED  No acute distress  Bed locked and in lowest position  Call bell within reach

## 2019-10-29 NOTE — ASSESSMENT & PLAN NOTE
· Pt sent from [facility] with hyperglycemia glucose in 300s    Here in 200s in ED and most recent glucose hypoglycemic in 50s  · Pt reports she has been eating sweets while in rehab and put sugar on her cereal this morning  · Check Hgb A1c  · Accu checks QID

## 2019-10-29 NOTE — ASSESSMENT & PLAN NOTE
· Abnormal UA on admission and given IV ceftriaxone in ED  · Urine culture is pending  · Pt is asymptomatic, denies dysuria, hematuria  Has frequency however chronic; she is on diuretics  · Afebrile, no leukocytosis    Monitor off any further antibiotic

## 2019-10-29 NOTE — ED NOTES
Pt with dizziness, diaphoresis and feeling shaky  RN checked BG, see epic for results  Dr Oliveira Dural aware, pt given apple juice and crackers        Raymond Jane, KEHINDE  10/29/19 9897

## 2019-10-30 VITALS
SYSTOLIC BLOOD PRESSURE: 129 MMHG | RESPIRATION RATE: 18 BRPM | WEIGHT: 157.4 LBS | DIASTOLIC BLOOD PRESSURE: 82 MMHG | HEIGHT: 64 IN | BODY MASS INDEX: 26.87 KG/M2 | TEMPERATURE: 98.2 F | OXYGEN SATURATION: 97 % | HEART RATE: 81 BPM

## 2019-10-30 PROBLEM — N30.00 ACUTE CYSTITIS WITHOUT HEMATURIA: Status: ACTIVE | Noted: 2019-10-29

## 2019-10-30 LAB
ALBUMIN SERPL BCP-MCNC: 2.7 G/DL (ref 3.5–5)
ALP SERPL-CCNC: 85 U/L (ref 46–116)
ALT SERPL W P-5'-P-CCNC: 35 U/L (ref 12–78)
ANION GAP SERPL CALCULATED.3IONS-SCNC: 7 MMOL/L (ref 4–13)
AST SERPL W P-5'-P-CCNC: 46 U/L (ref 5–45)
BASOPHILS # BLD AUTO: 0.01 THOUSANDS/ΜL (ref 0–0.1)
BASOPHILS NFR BLD AUTO: 0 % (ref 0–1)
BILIRUB SERPL-MCNC: 2.26 MG/DL (ref 0.2–1)
BUN SERPL-MCNC: 10 MG/DL (ref 5–25)
CALCIUM SERPL-MCNC: 8.7 MG/DL (ref 8.3–10.1)
CHLORIDE SERPL-SCNC: 114 MMOL/L (ref 100–108)
CO2 SERPL-SCNC: 23 MMOL/L (ref 21–32)
CREAT SERPL-MCNC: 0.54 MG/DL (ref 0.6–1.3)
EOSINOPHIL # BLD AUTO: 0.36 THOUSAND/ΜL (ref 0–0.61)
EOSINOPHIL NFR BLD AUTO: 7 % (ref 0–6)
ERYTHROCYTE [DISTWIDTH] IN BLOOD BY AUTOMATED COUNT: 13.1 % (ref 11.6–15.1)
EST. AVERAGE GLUCOSE BLD GHB EST-MCNC: 94 MG/DL
GFR SERPL CREATININE-BSD FRML MDRD: 112 ML/MIN/1.73SQ M
GLUCOSE P FAST SERPL-MCNC: 97 MG/DL (ref 65–99)
GLUCOSE SERPL-MCNC: 168 MG/DL (ref 65–140)
GLUCOSE SERPL-MCNC: 97 MG/DL (ref 65–140)
GLUCOSE SERPL-MCNC: 98 MG/DL (ref 65–140)
HBA1C MFR BLD: 4.9 % (ref 4.2–6.3)
HCT VFR BLD AUTO: 34 % (ref 34.8–46.1)
HGB BLD-MCNC: 11.3 G/DL (ref 11.5–15.4)
IMM GRANULOCYTES # BLD AUTO: 0.02 THOUSAND/UL (ref 0–0.2)
IMM GRANULOCYTES NFR BLD AUTO: 0 % (ref 0–2)
INR PPP: 1.57 (ref 0.84–1.19)
LYMPHOCYTES # BLD AUTO: 1.25 THOUSANDS/ΜL (ref 0.6–4.47)
LYMPHOCYTES NFR BLD AUTO: 23 % (ref 14–44)
MCH RBC QN AUTO: 37.8 PG (ref 26.8–34.3)
MCHC RBC AUTO-ENTMCNC: 33.2 G/DL (ref 31.4–37.4)
MCV RBC AUTO: 114 FL (ref 82–98)
MONOCYTES # BLD AUTO: 0.43 THOUSAND/ΜL (ref 0.17–1.22)
MONOCYTES NFR BLD AUTO: 8 % (ref 4–12)
NEUTROPHILS # BLD AUTO: 3.47 THOUSANDS/ΜL (ref 1.85–7.62)
NEUTS SEG NFR BLD AUTO: 62 % (ref 43–75)
NRBC BLD AUTO-RTO: 0 /100 WBCS
PLATELET # BLD AUTO: 82 THOUSANDS/UL (ref 149–390)
PMV BLD AUTO: 10.4 FL (ref 8.9–12.7)
POTASSIUM SERPL-SCNC: 3.9 MMOL/L (ref 3.5–5.3)
PROT SERPL-MCNC: 6.9 G/DL (ref 6.4–8.2)
PROTHROMBIN TIME: 18.3 SECONDS (ref 11.6–14.5)
RBC # BLD AUTO: 2.99 MILLION/UL (ref 3.81–5.12)
SODIUM SERPL-SCNC: 144 MMOL/L (ref 136–145)
WBC # BLD AUTO: 5.54 THOUSAND/UL (ref 4.31–10.16)

## 2019-10-30 PROCEDURE — 83036 HEMOGLOBIN GLYCOSYLATED A1C: CPT | Performed by: PHYSICIAN ASSISTANT

## 2019-10-30 PROCEDURE — 99217 PR OBSERVATION CARE DISCHARGE MANAGEMENT: CPT | Performed by: INTERNAL MEDICINE

## 2019-10-30 PROCEDURE — 85025 COMPLETE CBC W/AUTO DIFF WBC: CPT | Performed by: PHYSICIAN ASSISTANT

## 2019-10-30 PROCEDURE — 85610 PROTHROMBIN TIME: CPT | Performed by: PHYSICIAN ASSISTANT

## 2019-10-30 PROCEDURE — 80053 COMPREHEN METABOLIC PANEL: CPT | Performed by: PHYSICIAN ASSISTANT

## 2019-10-30 PROCEDURE — 82948 REAGENT STRIP/BLOOD GLUCOSE: CPT

## 2019-10-30 RX ORDER — CEPHALEXIN 500 MG/1
500 CAPSULE ORAL EVERY 8 HOURS SCHEDULED
Status: DISCONTINUED | OUTPATIENT
Start: 2019-10-30 | End: 2019-10-30 | Stop reason: HOSPADM

## 2019-10-30 RX ORDER — CEPHALEXIN 500 MG/1
500 CAPSULE ORAL EVERY 8 HOURS SCHEDULED
Status: DISCONTINUED | OUTPATIENT
Start: 2019-10-30 | End: 2019-10-30

## 2019-10-30 RX ORDER — CEPHALEXIN 500 MG/1
500 CAPSULE ORAL EVERY 8 HOURS SCHEDULED
Qty: 9 CAPSULE | Refills: 0 | Status: CANCELLED | OUTPATIENT
Start: 2019-10-30 | End: 2019-11-02

## 2019-10-30 RX ORDER — CEPHALEXIN 500 MG/1
500 CAPSULE ORAL EVERY 8 HOURS SCHEDULED
Qty: 8 CAPSULE | Refills: 0 | Status: SHIPPED | OUTPATIENT
Start: 2019-10-30 | End: 2019-11-02

## 2019-10-30 RX ADMIN — CEPHALEXIN 500 MG: 500 CAPSULE ORAL at 13:52

## 2019-10-30 RX ADMIN — SPIRONOLACTONE 25 MG: 25 TABLET ORAL at 08:04

## 2019-10-30 RX ADMIN — THIAMINE HCL TAB 100 MG 100 MG: 100 TAB at 08:03

## 2019-10-30 RX ADMIN — FERROUS SULFATE TAB 325 MG (65 MG ELEMENTAL FE) 325 MG: 325 (65 FE) TAB at 08:04

## 2019-10-30 RX ADMIN — Medication 400 MCG: at 08:03

## 2019-10-30 RX ADMIN — FUROSEMIDE 40 MG: 40 TABLET ORAL at 08:04

## 2019-10-30 NOTE — SOCIAL WORK
Met with pt & explained CM role  Pt goes by  "Romana"  She was currently from [facility] IP alcohol treatment & was going to dc from there on Nov 2nd & go to outpatient care  Her plan is to return when medically cleared  Called Ronald Vazquez at [facility] admission team [phone] ext [phone] whom stated pt can return & might need a new auth if was gone greater than 24 hrs  He stated Cm to call ns team [phone] to inquire when pt is medically cleared  Pt states prior to IP alcohol rehab p t lived in 2 sty home with significant other  Was iPT[employer], unemployed & drives  No dme  No h/o rna or rhb  Denies any MH or drug treatment  Has H/o alcohol use with vodka daily prior to rehab  Uses [facility] in [place]  PCP Dr Dione Souza  No POA  Emergency contact, significant other Marcus Carrasquillo [phone]  CM reviewed d/c planning process including the following: identifying help at home, patient preference for d/c planning needs, Discharge Lounge, [facility] Meds to Bed program, availability of treatment team to discuss questions or concerns patient and/or family may have regarding understanding medications and recognizing signs and symptoms once discharged  CM also encouraged patient to follow up with all recommended appointments after discharge  Patient advised of importance for patient and family to participate in managing patients medical well being

## 2019-10-30 NOTE — UTILIZATION REVIEW
Initial Clinical Review    Admission: Date/Time/Statement: OBSERVATION 10/29/19 @ 1448    Orders Placed This Encounter   Procedures    Place in Observation (expected length of stay for this patient is less than two midnights)     Standing Status:   Standing     Number of Occurrences:   1     Order Specific Question:   Admitting Physician     Answer:   Juan Masters [[id number]]     Order Specific Question:   Level of Care     Answer:   Med Surg [16]     ED Arrival Information     Expected Arrival Acuity Means of Arrival Escorted By Service Admission Type    - 10/29/2019 10:26 Urgent Ambulance SLESALLY Mendez) Hospitalist Urgent    Arrival Complaint    hypergycemia        Chief Complaint   Patient presents with    Hyperglycemia - Symptomatic     pt is coming from [employer] where she is 28days sober from ETOH  to cope with not drinking she has been eating a lot of high sugar foods  this am the pt felt fatigue and lightheaded, near syncope  staff checked her sugar and it was 372 at 0830  no hx of DM    Fatigue     Assessment/Plan:   MS WHATLEY IS A 49 YO FEMALE WHO PRESENTS TO THE ED VIA EMS FROM AN ALCOHOL REHAB FACILITY WITH C/O HYPERGLYCEMIA WITH BLOOD GLUCOSE AT FACILITY   SHE WAS FEELING SHAKY AND HAVING VISUAL CHANGES  IN ED GLUCOSE IN 200s  SHE HAD NO H/O DM  ALSO C/O ABD FULLNESS, HAS BEEN EATING HIGH SUGAR FOODS AND SALTY FOODS   + C/O URINARY FREQUENCY  SHE IS ON DIURETICS  PMH:  ALCOHOLIC CIRRHOSIS, LYMPHEDEMA, ANXIETY  SHE IS ADMITTED TO OBSERVATION STATUS WITH HYPERGLYCEMIA - POC GLUCOSE QID, CHECK A1C  ABNORMAL UA - CULTURE PENDING, IV ANTIBIOTICS    ALCOHOL ABUSE - IN REHAB CURRENTLY AND KNOWN CIRRHOSIS - CONTINUE MEDS, AMMONIA 83 AND GIVEN LACTULOSE, ABD US, F/U OP WITH GI - APPT NEXT WEEK       10/30 DISCHARGE THIS AFTERNOON     ED Triage Vitals   Temperature Pulse Respirations Blood Pressure SpO2   10/29/19 1037 10/29/19 1037 10/29/19 1037 10/29/19 1037 10/29/19 1037   98 5 °F (36 9 °C) 81 18 165/83 97 %      Temp Source Heart Rate Source Patient Position - Orthostatic VS BP Location FiO2 (%)   10/29/19 1037 10/29/19 1203 10/29/19 1203 10/29/19 1203 --   Oral Monitor Sitting Right arm       Pain Score       10/29/19 1037       No Pain        Wt Readings from Last 1 Encounters:   10/29/19 71 4 kg (157 lb 6 4 oz)     Additional Vital Signs:   10/30/19 07:29:26  97 9 °F (36 6 °C)  83  16  129/83  98  96 %     10/30/19 07:28:24  97 9 °F (36 6 °C)  82  16  129/83  98  96 %     10/29/19 23:58:51  97 9 °F (36 6 °C)  85  17  130/83  99  97 %     10/29/19 16:58:12  98 °F (36 7 °C)  80  16  154/90  111  98 %     10/29/19 1626    78  18  151/89    97 %  None (Room air)   10/29/19 1530    78  18  146/80         10/29/19 1400    72  16  123/70    97 %     10/29/19 1300    76  20  123/69         10/29/19 1203    80  18  151/77    97 %  None (Room air)   10/29/19 1130    78    111/59    93 %       Pertinent Labs/Diagnostic Test Results:     10/29 US ABD - No evidence of ascites  Heterogeneous liver could be related to intrinsic liver disease as suggested by the history  Possible recannulated umbilical vein which can be associated with portal venous hypertension      10/29 ECG - Normal sinus rhythm  Normal ECG  When compared with ECG of 21-JUN-2013 14:28,  Premature ventricular complexes are no longer Present  QT has lengthened    Results from last 7 days   Lab Units 10/30/19  0450 10/29/19  1238   WBC Thousand/uL 5 54 5 78   HEMOGLOBIN g/dL 11 3* 11 9   HEMATOCRIT % 34 0* 35 1   PLATELETS Thousands/uL 82* 92*   NEUTROS ABS Thousands/µL 3 47 3 48     Results from last 7 days   Lab Units 10/30/19  0450 10/29/19  1237   SODIUM mmol/L 144 140   POTASSIUM mmol/L 3 9 3 6   CHLORIDE mmol/L 114* 111*   CO2 mmol/L 23 22   ANION GAP mmol/L 7 7   BUN mg/dL 10 12   CREATININE mg/dL 0 54* 0 67   EGFR ml/min/1 73sq m 112 104   CALCIUM mg/dL 8 7 9 0   MAGNESIUM mg/dL  --  1 7     Results from last 7 days   Lab Units 10/30/19  0450 10/29/19  1238 10/29/19  1237   AST U/L 46*  --  52*   ALT U/L 35  --  37   ALK PHOS U/L 85  --  112   TOTAL PROTEIN g/dL 6 9  --  7 3   ALBUMIN g/dL 2 7*  --  2 6*   TOTAL BILIRUBIN mg/dL 2 26*  --  2 06*   AMMONIA umol/L  --  83*  --      Results from last 7 days   Lab Units 10/30/19  0724 10/29/19  2127 10/29/19  1544 10/29/19  1511 10/29/19  1201   POC GLUCOSE mg/dl 98 160* 99 54* 245*     Results from last 7 days   Lab Units 10/30/19  0450 10/29/19  1237   GLUCOSE RANDOM mg/dL 97 206*     Results from last 7 days   Lab Units 10/30/19  0450   HEMOGLOBIN A1C % 4 9   EAG mg/dl 94     Results from last 7 days   Lab Units 10/29/19  1238   PH JULIA  7 470*   PCO2 JULIA mm Hg 28 8*   PO2 JULIA mm Hg 132 5*   HCO3 JULIA mmol/L 20 5*   BASE EXC JULIA mmol/L -2 1   O2 CONTENT JULIA ml/dL 17 3   O2 HGB, VENOUS % 96 4*     Results from last 7 days   Lab Units 10/30/19  0137 10/29/19  1237   PROTIME seconds 18 3* 18 2*   INR  1 57* 1 56*   PTT seconds  --  35     Results from last 7 days   Lab Units 10/29/19  1237   LIPASE u/L 218     Results from last 7 days   Lab Units 10/29/19  1241   CLARITY UA  Cloudy   COLOR UA  Dk Yellow   SPEC GRAV UA  1 025   PH UA  6 0   GLUCOSE UA mg/dl >=1000 (1%)*   KETONES UA mg/dl Negative   BLOOD UA  Negative   PROTEIN UA mg/dl Negative   NITRITE UA  Positive*   BILIRUBIN UA  Negative   UROBILINOGEN UA E U /dl 0 2   LEUKOCYTES UA  Small*   WBC UA /hpf Innumerable*   RBC UA /hpf 4-10*   BACTERIA UA /hpf Innumerable*   EPITHELIAL CELLS WET PREP /hpf None Seen     ED Treatment:   Medication Administration from 10/29/2019 1026 to 10/29/2019 1640    Date/Time Order Dose Route Action   10/29/2019 1241 multi-electrolyte (ISOLYTE-S PH 7 4) bolus 1,000 mL 1,000 mL Intravenous New Bag   10/29/2019 1445 ceftriaxone (ROCEPHIN) 1 g/50 mL in dextrose IVPB 1,000 mg Intravenous New Bag   10/29/2019 1517 lactulose 20 g/30 mL oral solution 30 g 30 g Oral Given        Past Medical History:   Diagnosis Date    Alcohol abuse     Cirrhosis ([facility] [address] )     Hypertension      Admitting Diagnosis: Hyperammonemia (HCC) [E72 20]  UTI (urinary tract infection) [N39 0]  Hyperglycemia [R73 9]     Age/Sex: 51 y o  female     Admission Orders:    Medications:  cefTRIAXone 1,000 mg Intravenous Q24H   ferrous sulfate 325 mg Oral Daily With Breakfast   folic acid 407 mcg Oral Daily   furosemide 40 mg Oral Daily   mirtazapine 30 mg Oral HS   nicotine 1 patch Transdermal Daily   senna 1 tablet Oral Daily   spironolactone 25 mg Oral Daily   thiamine 100 mg Oral Daily     calcium carbonate 1,000 mg Oral Daily PRN    methocarbamol 500 mg Oral HS PRN X1 10/29   ondansetron 4 mg Intravenous Q6H PRN      SCDs  AMBULATE Q SHICT  POC GLUCOSE AC/HS WITH SSI COVERAGE   US ABD,   URINE CULTURE   REGULAR DIET   IP CONSULT TO CASE MANAGEMENT    Network Utilization Review Department  [email or web address]  ATTENTION: Please call with any questions or concerns to [phone] and carefully listen to the prompts so that you are directed to the right person  All voicemails are confidential   Katy Capellan all requests for admission clinical reviews, approved or denied determinations and any other requests to dedicated fax number below belonging to the campus where the patient is receiving treatment    FACILITY NAME UR FAX NUMBER   ADMISSION DENIALS (Administrative/Medical Necessity) [address] [facility] (Maternity/NICU/Pediatrics) [phone]   [address] [facility] [phone]   [address] [place] [address] 1525 [facility] [phone]   Fiorella Mendez [address] [phone]

## 2019-10-30 NOTE — DISCHARGE SUMMARY
Discharge Summary - [address] Internal Medicine    Patient Information: Janyth Apgar 51 y o  female MRN: [id number]  Unit/Bed#: [address] 822-01 Encounter: [id number]    Discharging Physician / Practitioner: Valeda Halsted, MD  PCP: Homa Solis DO  Admission Date: 10/29/2019  Discharge Date: 10/30/19    Disposition:     Other: [facility]    Reason for Admission:  Hyperglycemia    Discharge Diagnoses:     Principal Problem:    Hyperglycemia  Active Problems:    Alcoholic cirrhosis ([address] )    Alcohol abuse    Acute cystitis without hematuria  Resolved Problems:    * No resolved hospital problems  *      Consultations During Hospital Stay:  · None    Procedures Performed:     · None    Significant Findings / Test Results:     · Right upper quadrant ultrasound with her heterogenous liver, no ascites  Incidental Findings:   · None     Test Results Pending at Discharge (will require follow up):   · Urine culture     Outpatient Tests Requested:  · None    Complications:  None    Hospital Course:     Janyth Apgar is a 51 y o  female patient who originally presented to the hospital on 10/29/2019 due to hyperglycemia  The patient was in inpatient alcohol rehabilitation, she noted a significant change in her diet which included increased sweets and carbs  She felt unwell and was found to have a glucose of 370  She was referred to the ED for evaluation there this was confirmed but was followed by a sharp drop in her glucose without intervention  In the ED she was also found to have a UTI and was admitted for observation  She was given IV antibiotics, UTI remained mild and she was transitioned to oral Keflex  Hemoglobin A1c was checked and found to be well within normal limits  Upon review of prior records she did have episodes of pancreatitis in the past and likely has some chronic pancreatitis with diet-controlled diabetes    She was counseled on a low sugar/low carb diet adherence and was counseled to monitor glucose twice daily and notify her PCP if this elevates above 200  Condition at Discharge: stable     Discharge Day Visit / Exam:     Subjective:  Denies any acute complaints  Vitals: Blood Pressure: 129/82 (10/30/19 1521)  Pulse: 81 (10/30/19 1521)  Temperature: 98 2 °F (36 8 °C) (10/30/19 1521)  Temp Source: Oral (10/29/19 1037)  Respirations: 18 (10/30/19 1521)  Height: 5' 4" (162 6 cm) (10/29/19 1658)  Weight - Scale: 71 4 kg (157 lb 6 4 oz) (10/29/19 1658)  SpO2: 97 % (10/30/19 1521)  Exam:   Physical Exam   Constitutional: She is oriented to person, place, and time  She appears well-developed and well-nourished  HENT:   Head: Normocephalic and atraumatic  Eyes: EOM are normal    Neck: Neck supple  Pulmonary/Chest: Effort normal    Abdominal: Soft  She exhibits no distension  There is no tenderness  Neurological: She is alert and oriented to person, place, and time  Discharge instructions/Information to patient and family:   See after visit summary for information provided to patient and family  Provisions for Follow-Up Care:  See after visit summary for information related to follow-up care and any pertinent home health orders  Planned Readmission: None     Discharge Statement:  I spent 35 minutes discharging the patient  This time was spent on the day of discharge  I had direct contact with the patient on the day of discharge  Greater than 50% of the total time was spent examining patient, answering all patient questions, arranging and discussing plan of care with patient as well as directly providing post-discharge instructions  Additional time then spent on discharge activities  Discharge Medications:  See after visit summary for reconciled discharge medications provided to patient and family        ** Please Note: This note has been constructed using a voice recognition system **

## 2019-10-30 NOTE — SOCIAL WORK
S/w SLIM & pt not anticipated for dc today  Not medically cleared at this time  Called [facility] & s/w nsg manager, Karl Jarrell whom stated they usually only hold bed for 24 hrs & pt is due to be dc'd on Nov 2nd  She asked CM to call when pt is medically cleared to see if bed still on hold & discuss if pt can return to them  Contact number [phone]

## 2019-10-30 NOTE — SOCIAL WORK
Informed by KATELYN that pt is now medically cleared for discharge  Curt Akins, nsg manager at [facility] whom asked CM to call Angy Lauren in admissions to arrange for dc  S/w Angy Lauren whom confirmed pt can return & no auth needed  Pt will return on auth they already have  They will transport pt back today at 330 pm  Scripts can be given to pt to give to nsg  Pt agreeable to plan   Updated KEHINDE YANEZ & Pt

## 2019-10-31 LAB — BACTERIA UR CULT: ABNORMAL

## 2021-03-05 ENCOUNTER — APPOINTMENT (EMERGENCY)
Dept: RADIOLOGY | Facility: HOSPITAL | Age: 51
DRG: 280 | End: 2021-03-05
Payer: COMMERCIAL

## 2021-03-05 ENCOUNTER — HOSPITAL ENCOUNTER (INPATIENT)
Facility: HOSPITAL | Age: 51
LOS: 2 days | Discharge: NURSING, REHAB OR LONG-TERM CARE | DRG: 280 | End: 2021-03-09
Attending: EMERGENCY MEDICINE | Admitting: INTERNAL MEDICINE
Payer: COMMERCIAL

## 2021-03-05 DIAGNOSIS — D69.6 THROMBOCYTOPENIA (HCC): ICD-10-CM

## 2021-03-05 DIAGNOSIS — R06.00 DYSPNEA: Primary | ICD-10-CM

## 2021-03-05 DIAGNOSIS — R18.8 CIRRHOSIS OF LIVER WITH ASCITES (HCC): ICD-10-CM

## 2021-03-05 DIAGNOSIS — R60.9 EDEMA: ICD-10-CM

## 2021-03-05 DIAGNOSIS — K74.60 CIRRHOSIS OF LIVER WITH ASCITES (HCC): ICD-10-CM

## 2021-03-05 DIAGNOSIS — K70.30 ALCOHOLIC CIRRHOSIS OF LIVER WITHOUT ASCITES (HCC): ICD-10-CM

## 2021-03-05 PROBLEM — F32.A DEPRESSION: Status: ACTIVE | Noted: 2021-03-05

## 2021-03-05 PROBLEM — R06.02 SHORTNESS OF BREATH: Status: ACTIVE | Noted: 2021-03-05

## 2021-03-05 LAB
ALBUMIN SERPL BCP-MCNC: 2.5 G/DL (ref 3.5–5)
ALP SERPL-CCNC: 140 U/L (ref 46–116)
ALT SERPL W P-5'-P-CCNC: 20 U/L (ref 12–78)
AMMONIA PLAS-SCNC: 47 UMOL/L (ref 11–35)
ANION GAP SERPL CALCULATED.3IONS-SCNC: 7 MMOL/L (ref 4–13)
APTT PPP: 36 SECONDS (ref 23–37)
AST SERPL W P-5'-P-CCNC: 32 U/L (ref 5–45)
BILIRUB SERPL-MCNC: 3.81 MG/DL (ref 0.2–1)
BUN SERPL-MCNC: 12 MG/DL (ref 5–25)
CALCIUM ALBUM COR SERPL-MCNC: 9.1 MG/DL (ref 8.3–10.1)
CALCIUM SERPL-MCNC: 7.9 MG/DL (ref 8.3–10.1)
CHLORIDE SERPL-SCNC: 107 MMOL/L (ref 100–108)
CO2 SERPL-SCNC: 25 MMOL/L (ref 21–32)
CREAT SERPL-MCNC: 0.73 MG/DL (ref 0.6–1.3)
ERYTHROCYTE [DISTWIDTH] IN BLOOD BY AUTOMATED COUNT: 15.7 % (ref 11.6–15.1)
GFR SERPL CREATININE-BSD FRML MDRD: 97 ML/MIN/1.73SQ M
GLUCOSE SERPL-MCNC: 100 MG/DL (ref 65–140)
HCT VFR BLD AUTO: 24.6 % (ref 34.8–46.1)
HGB BLD-MCNC: 7.6 G/DL (ref 11.5–15.4)
INR PPP: 1.68 (ref 0.84–1.19)
MCH RBC QN AUTO: 34.7 PG (ref 26.8–34.3)
MCHC RBC AUTO-ENTMCNC: 30.9 G/DL (ref 31.4–37.4)
MCV RBC AUTO: 112 FL (ref 82–98)
PLATELET # BLD AUTO: 58 THOUSANDS/UL (ref 149–390)
PMV BLD AUTO: 10.9 FL (ref 8.9–12.7)
POTASSIUM SERPL-SCNC: 3.6 MMOL/L (ref 3.5–5.3)
PROT SERPL-MCNC: 5.8 G/DL (ref 6.4–8.2)
PROTHROMBIN TIME: 19.8 SECONDS (ref 11.6–14.5)
RBC # BLD AUTO: 2.19 MILLION/UL (ref 3.81–5.12)
SODIUM SERPL-SCNC: 139 MMOL/L (ref 136–145)
TROPONIN I SERPL-MCNC: <0.02 NG/ML
WBC # BLD AUTO: 5.14 THOUSAND/UL (ref 4.31–10.16)

## 2021-03-05 PROCEDURE — 99285 EMERGENCY DEPT VISIT HI MDM: CPT | Performed by: EMERGENCY MEDICINE

## 2021-03-05 PROCEDURE — 99285 EMERGENCY DEPT VISIT HI MDM: CPT

## 2021-03-05 PROCEDURE — 85025 COMPLETE CBC W/AUTO DIFF WBC: CPT | Performed by: EMERGENCY MEDICINE

## 2021-03-05 PROCEDURE — 84484 ASSAY OF TROPONIN QUANT: CPT | Performed by: EMERGENCY MEDICINE

## 2021-03-05 PROCEDURE — 36415 COLL VENOUS BLD VENIPUNCTURE: CPT | Performed by: EMERGENCY MEDICINE

## 2021-03-05 PROCEDURE — 93005 ELECTROCARDIOGRAM TRACING: CPT

## 2021-03-05 PROCEDURE — 85730 THROMBOPLASTIN TIME PARTIAL: CPT | Performed by: EMERGENCY MEDICINE

## 2021-03-05 PROCEDURE — 82140 ASSAY OF AMMONIA: CPT | Performed by: EMERGENCY MEDICINE

## 2021-03-05 PROCEDURE — 71045 X-RAY EXAM CHEST 1 VIEW: CPT

## 2021-03-05 PROCEDURE — 80053 COMPREHEN METABOLIC PANEL: CPT | Performed by: EMERGENCY MEDICINE

## 2021-03-05 PROCEDURE — 83880 ASSAY OF NATRIURETIC PEPTIDE: CPT | Performed by: INTERNAL MEDICINE

## 2021-03-05 PROCEDURE — 85610 PROTHROMBIN TIME: CPT | Performed by: EMERGENCY MEDICINE

## 2021-03-05 RX ORDER — FERROUS SULFATE 325(65) MG
325 TABLET ORAL
Status: DISCONTINUED | OUTPATIENT
Start: 2021-03-06 | End: 2021-03-08

## 2021-03-05 RX ORDER — ONDANSETRON 4 MG/1
4 TABLET, FILM COATED ORAL EVERY 8 HOURS PRN
COMMUNITY

## 2021-03-05 RX ORDER — LACTULOSE 20 G/30ML
20 SOLUTION ORAL 3 TIMES DAILY
Status: DISCONTINUED | OUTPATIENT
Start: 2021-03-06 | End: 2021-03-07

## 2021-03-05 RX ORDER — ESCITALOPRAM OXALATE 10 MG/1
10 TABLET ORAL DAILY
Status: DISCONTINUED | OUTPATIENT
Start: 2021-03-06 | End: 2021-03-09 | Stop reason: HOSPADM

## 2021-03-05 RX ORDER — TRAZODONE HYDROCHLORIDE 50 MG/1
50 TABLET ORAL
COMMUNITY

## 2021-03-05 RX ORDER — HEPARIN SODIUM 5000 [USP'U]/ML
5000 INJECTION, SOLUTION INTRAVENOUS; SUBCUTANEOUS EVERY 8 HOURS SCHEDULED
Status: DISCONTINUED | OUTPATIENT
Start: 2021-03-05 | End: 2021-03-09 | Stop reason: HOSPADM

## 2021-03-05 RX ORDER — BISACODYL 10 MG
10 SUPPOSITORY, RECTAL RECTAL AS NEEDED
COMMUNITY

## 2021-03-05 RX ORDER — TRAZODONE HYDROCHLORIDE 50 MG/1
50 TABLET ORAL
Status: DISCONTINUED | OUTPATIENT
Start: 2021-03-05 | End: 2021-03-05

## 2021-03-05 RX ORDER — SODIUM CHLORIDE 9 MG/ML
3 INJECTION INTRAVENOUS
Status: DISCONTINUED | OUTPATIENT
Start: 2021-03-05 | End: 2021-03-09 | Stop reason: HOSPADM

## 2021-03-05 RX ORDER — LANOLIN ALCOHOL/MO/W.PET/CERES
100 CREAM (GRAM) TOPICAL DAILY
Status: DISCONTINUED | OUTPATIENT
Start: 2021-03-06 | End: 2021-03-09 | Stop reason: HOSPADM

## 2021-03-05 RX ORDER — LACTULOSE 20 G/30ML
20 SOLUTION ORAL ONCE
Status: DISCONTINUED | OUTPATIENT
Start: 2021-03-05 | End: 2021-03-08

## 2021-03-05 RX ORDER — CHOLECALCIFEROL (VITAMIN D3) 125 MCG
5 CAPSULE ORAL
COMMUNITY

## 2021-03-05 RX ORDER — SPIRONOLACTONE 50 MG/1
50 TABLET, FILM COATED ORAL DAILY
Status: DISCONTINUED | OUTPATIENT
Start: 2021-03-06 | End: 2021-03-06

## 2021-03-05 RX ORDER — ESCITALOPRAM OXALATE 10 MG/1
10 TABLET ORAL DAILY
Status: DISCONTINUED | OUTPATIENT
Start: 2021-03-06 | End: 2021-03-05

## 2021-03-05 RX ORDER — POLYETHYLENE GLYCOL 3350 17 G/17G
17 POWDER, FOR SOLUTION ORAL DAILY
COMMUNITY

## 2021-03-05 RX ORDER — PANTOPRAZOLE SODIUM 40 MG/1
40 TABLET, DELAYED RELEASE ORAL
Status: DISCONTINUED | OUTPATIENT
Start: 2021-03-06 | End: 2021-03-09 | Stop reason: HOSPADM

## 2021-03-05 RX ORDER — ZINC SULFATE 50(220)MG
110 CAPSULE ORAL DAILY
COMMUNITY

## 2021-03-05 RX ORDER — POTASSIUM CHLORIDE 20 MEQ/1
20 TABLET, EXTENDED RELEASE ORAL ONCE
Status: COMPLETED | OUTPATIENT
Start: 2021-03-05 | End: 2021-03-06

## 2021-03-05 RX ORDER — HYDROXYZINE HYDROCHLORIDE 25 MG/1
50 TABLET, FILM COATED ORAL ONCE
Status: COMPLETED | OUTPATIENT
Start: 2021-03-05 | End: 2021-03-06

## 2021-03-05 RX ORDER — ESCITALOPRAM OXALATE 10 MG/1
10 TABLET ORAL DAILY
COMMUNITY

## 2021-03-05 RX ORDER — LANOLIN ALCOHOL/MO/W.PET/CERES
6 CREAM (GRAM) TOPICAL
Status: DISCONTINUED | OUTPATIENT
Start: 2021-03-05 | End: 2021-03-09 | Stop reason: HOSPADM

## 2021-03-05 RX ORDER — PANTOPRAZOLE SODIUM 40 MG/1
40 TABLET, DELAYED RELEASE ORAL 2 TIMES DAILY
COMMUNITY

## 2021-03-05 RX ORDER — ACETAMINOPHEN 325 MG/1
650 TABLET ORAL AS NEEDED
COMMUNITY

## 2021-03-05 RX ORDER — TRIAMCINOLONE ACETONIDE 5 MG/G
CREAM TOPICAL 3 TIMES DAILY
COMMUNITY

## 2021-03-05 RX ORDER — POTASSIUM CHLORIDE 750 MG/1
40 CAPSULE, EXTENDED RELEASE ORAL 3 TIMES DAILY
COMMUNITY

## 2021-03-05 RX ORDER — SPIRONOLACTONE 50 MG/1
150 TABLET, FILM COATED ORAL DAILY
Status: DISCONTINUED | OUTPATIENT
Start: 2021-03-06 | End: 2021-03-05

## 2021-03-05 RX ORDER — ACETAMINOPHEN 325 MG/1
975 TABLET ORAL ONCE
Status: COMPLETED | OUTPATIENT
Start: 2021-03-05 | End: 2021-03-05

## 2021-03-05 RX ORDER — POLYETHYLENE GLYCOL 3350 17 G/17G
17 POWDER, FOR SOLUTION ORAL DAILY
Status: DISCONTINUED | OUTPATIENT
Start: 2021-03-06 | End: 2021-03-09 | Stop reason: HOSPADM

## 2021-03-05 RX ORDER — FUROSEMIDE 10 MG/ML
40 INJECTION INTRAMUSCULAR; INTRAVENOUS ONCE
Status: COMPLETED | OUTPATIENT
Start: 2021-03-05 | End: 2021-03-06

## 2021-03-05 RX ORDER — TRAZODONE HYDROCHLORIDE 50 MG/1
50 TABLET ORAL ONCE
Status: COMPLETED | OUTPATIENT
Start: 2021-03-05 | End: 2021-03-05

## 2021-03-05 RX ORDER — LANOLIN ALCOHOL/MO/W.PET/CERES
400 CREAM (GRAM) TOPICAL DAILY
Status: DISCONTINUED | OUTPATIENT
Start: 2021-03-06 | End: 2021-03-09 | Stop reason: HOSPADM

## 2021-03-05 RX ORDER — IRON POLYSACCHARIDE COMPLEX 150 MG
150 CAPSULE ORAL 2 TIMES DAILY
COMMUNITY

## 2021-03-05 RX ORDER — TRAZODONE HYDROCHLORIDE 50 MG/1
50 TABLET ORAL
Status: DISCONTINUED | OUTPATIENT
Start: 2021-03-06 | End: 2021-03-09 | Stop reason: HOSPADM

## 2021-03-05 RX ADMIN — ACETAMINOPHEN 975 MG: 325 TABLET ORAL at 22:56

## 2021-03-05 RX ADMIN — RIFAXIMIN 550 MG: 550 TABLET ORAL at 22:56

## 2021-03-05 RX ADMIN — ESCITALOPRAM OXALATE 10 MG: 10 TABLET ORAL at 22:57

## 2021-03-05 RX ADMIN — TRAZODONE HYDROCHLORIDE 50 MG: 50 TABLET ORAL at 22:57

## 2021-03-05 RX ADMIN — MELATONIN TAB 3 MG 6 MG: 3 TAB at 22:57

## 2021-03-06 ENCOUNTER — APPOINTMENT (OUTPATIENT)
Dept: NON INVASIVE DIAGNOSTICS | Facility: HOSPITAL | Age: 51
DRG: 280 | End: 2021-03-06
Payer: COMMERCIAL

## 2021-03-06 ENCOUNTER — APPOINTMENT (OUTPATIENT)
Dept: RADIOLOGY | Facility: HOSPITAL | Age: 51
DRG: 280 | End: 2021-03-06
Payer: COMMERCIAL

## 2021-03-06 PROBLEM — D53.9 MACROCYTIC ANEMIA: Status: ACTIVE | Noted: 2021-03-06

## 2021-03-06 LAB
ABO GROUP BLD: NORMAL
ABO GROUP BLD: NORMAL
ALBUMIN SERPL BCP-MCNC: 2.1 G/DL (ref 3.5–5)
ALP SERPL-CCNC: 101 U/L (ref 46–116)
ALT SERPL W P-5'-P-CCNC: 17 U/L (ref 12–78)
ANION GAP SERPL CALCULATED.3IONS-SCNC: 8 MMOL/L (ref 4–13)
AST SERPL W P-5'-P-CCNC: 25 U/L (ref 5–45)
ATRIAL RATE: 74 BPM
BASOPHILS # BLD AUTO: 0.01 THOUSANDS/ΜL (ref 0–0.1)
BASOPHILS NFR BLD AUTO: 0 % (ref 0–1)
BILIRUB DIRECT SERPL-MCNC: 1.73 MG/DL (ref 0–0.2)
BILIRUB SERPL-MCNC: 4.09 MG/DL (ref 0.2–1)
BLD GP AB SCN SERPL QL: NEGATIVE
BUN SERPL-MCNC: 14 MG/DL (ref 5–25)
CALCIUM SERPL-MCNC: 7.7 MG/DL (ref 8.3–10.1)
CHLORIDE SERPL-SCNC: 107 MMOL/L (ref 100–108)
CO2 SERPL-SCNC: 25 MMOL/L (ref 21–32)
CREAT SERPL-MCNC: 0.75 MG/DL (ref 0.6–1.3)
EOSINOPHIL # BLD AUTO: 0.17 THOUSAND/ΜL (ref 0–0.61)
EOSINOPHIL NFR BLD AUTO: 5 % (ref 0–6)
ERYTHROCYTE [DISTWIDTH] IN BLOOD BY AUTOMATED COUNT: 15.7 % (ref 11.6–15.1)
FLUAV RNA RESP QL NAA+PROBE: NEGATIVE
FLUBV RNA RESP QL NAA+PROBE: NEGATIVE
FOLATE SERPL-MCNC: >20 NG/ML (ref 3.1–17.5)
GFR SERPL CREATININE-BSD FRML MDRD: 94 ML/MIN/1.73SQ M
GLUCOSE SERPL-MCNC: 108 MG/DL (ref 65–140)
HCT VFR BLD AUTO: 20.8 % (ref 34.8–46.1)
HCT VFR BLD AUTO: 22.4 % (ref 34.8–46.1)
HCT VFR BLD AUTO: 28.2 % (ref 34.8–46.1)
HGB BLD-MCNC: 6.5 G/DL (ref 11.5–15.4)
HGB BLD-MCNC: 7 G/DL (ref 11.5–15.4)
HGB BLD-MCNC: 8.9 G/DL (ref 11.5–15.4)
IMM GRANULOCYTES # BLD AUTO: 0.01 THOUSAND/UL (ref 0–0.2)
IMM GRANULOCYTES NFR BLD AUTO: 0 % (ref 0–2)
LYMPHOCYTES # BLD AUTO: 0.7 THOUSANDS/ΜL (ref 0.6–4.47)
LYMPHOCYTES NFR BLD AUTO: 20 % (ref 14–44)
MCH RBC QN AUTO: 35.1 PG (ref 26.8–34.3)
MCHC RBC AUTO-ENTMCNC: 31.3 G/DL (ref 31.4–37.4)
MCV RBC AUTO: 112 FL (ref 82–98)
MONOCYTES # BLD AUTO: 0.41 THOUSAND/ΜL (ref 0.17–1.22)
MONOCYTES NFR BLD AUTO: 12 % (ref 4–12)
NEUTROPHILS # BLD AUTO: 2.21 THOUSANDS/ΜL (ref 1.85–7.62)
NEUTS SEG NFR BLD AUTO: 63 % (ref 43–75)
NRBC BLD AUTO-RTO: 0 /100 WBCS
NT-PROBNP SERPL-MCNC: 246 PG/ML
P AXIS: 32 DEGREES
PLATELET # BLD AUTO: 50 THOUSANDS/UL (ref 149–390)
PLATELET # BLD AUTO: 55 THOUSANDS/UL (ref 149–390)
PMV BLD AUTO: 11 FL (ref 8.9–12.7)
PMV BLD AUTO: 11 FL (ref 8.9–12.7)
POTASSIUM SERPL-SCNC: 3.4 MMOL/L (ref 3.5–5.3)
PR INTERVAL: 152 MS
PROT SERPL-MCNC: 5 G/DL (ref 6.4–8.2)
QRS AXIS: 36 DEGREES
QRSD INTERVAL: 86 MS
QT INTERVAL: 414 MS
QTC INTERVAL: 459 MS
RBC # BLD AUTO: 1.85 MILLION/UL (ref 3.81–5.12)
RH BLD: NEGATIVE
RH BLD: NEGATIVE
RSV RNA RESP QL NAA+PROBE: NEGATIVE
SARS-COV-2 RNA RESP QL NAA+PROBE: NEGATIVE
SODIUM SERPL-SCNC: 140 MMOL/L (ref 136–145)
SPECIMEN EXPIRATION DATE: NORMAL
T WAVE AXIS: 28 DEGREES
TSH SERPL DL<=0.05 MIU/L-ACNC: 1.37 UIU/ML (ref 0.36–3.74)
VENTRICULAR RATE: 74 BPM
VIT B12 SERPL-MCNC: 2699 PG/ML (ref 100–900)
WBC # BLD AUTO: 3.51 THOUSAND/UL (ref 4.31–10.16)

## 2021-03-06 PROCEDURE — 84443 ASSAY THYROID STIM HORMONE: CPT | Performed by: INTERNAL MEDICINE

## 2021-03-06 PROCEDURE — 93306 TTE W/DOPPLER COMPLETE: CPT

## 2021-03-06 PROCEDURE — 76705 ECHO EXAM OF ABDOMEN: CPT

## 2021-03-06 PROCEDURE — 85025 COMPLETE CBC W/AUTO DIFF WBC: CPT | Performed by: INTERNAL MEDICINE

## 2021-03-06 PROCEDURE — 86850 RBC ANTIBODY SCREEN: CPT | Performed by: INTERNAL MEDICINE

## 2021-03-06 PROCEDURE — 86920 COMPATIBILITY TEST SPIN: CPT

## 2021-03-06 PROCEDURE — 93010 ELECTROCARDIOGRAM REPORT: CPT | Performed by: INTERNAL MEDICINE

## 2021-03-06 PROCEDURE — 86900 BLOOD TYPING SEROLOGIC ABO: CPT | Performed by: INTERNAL MEDICINE

## 2021-03-06 PROCEDURE — 99223 1ST HOSP IP/OBS HIGH 75: CPT | Performed by: INTERNAL MEDICINE

## 2021-03-06 PROCEDURE — 80048 BASIC METABOLIC PNL TOTAL CA: CPT | Performed by: INTERNAL MEDICINE

## 2021-03-06 PROCEDURE — 85014 HEMATOCRIT: CPT | Performed by: INTERNAL MEDICINE

## 2021-03-06 PROCEDURE — 82746 ASSAY OF FOLIC ACID SERUM: CPT | Performed by: INTERNAL MEDICINE

## 2021-03-06 PROCEDURE — 85049 AUTOMATED PLATELET COUNT: CPT | Performed by: INTERNAL MEDICINE

## 2021-03-06 PROCEDURE — 80076 HEPATIC FUNCTION PANEL: CPT | Performed by: INTERNAL MEDICINE

## 2021-03-06 PROCEDURE — 85018 HEMOGLOBIN: CPT | Performed by: INTERNAL MEDICINE

## 2021-03-06 PROCEDURE — 93306 TTE W/DOPPLER COMPLETE: CPT | Performed by: INTERNAL MEDICINE

## 2021-03-06 PROCEDURE — 86901 BLOOD TYPING SEROLOGIC RH(D): CPT | Performed by: INTERNAL MEDICINE

## 2021-03-06 PROCEDURE — 0241U HB NFCT DS VIR RESP RNA 4 TRGT: CPT | Performed by: EMERGENCY MEDICINE

## 2021-03-06 PROCEDURE — 82607 VITAMIN B-12: CPT | Performed by: INTERNAL MEDICINE

## 2021-03-06 PROCEDURE — 97166 OT EVAL MOD COMPLEX 45 MIN: CPT

## 2021-03-06 PROCEDURE — P9040 RBC LEUKOREDUCED IRRADIATED: HCPCS

## 2021-03-06 PROCEDURE — 30233N1 TRANSFUSION OF NONAUTOLOGOUS RED BLOOD CELLS INTO PERIPHERAL VEIN, PERCUTANEOUS APPROACH: ICD-10-PCS | Performed by: INTERNAL MEDICINE

## 2021-03-06 PROCEDURE — 97163 PT EVAL HIGH COMPLEX 45 MIN: CPT

## 2021-03-06 PROCEDURE — 36415 COLL VENOUS BLD VENIPUNCTURE: CPT | Performed by: INTERNAL MEDICINE

## 2021-03-06 RX ORDER — FUROSEMIDE 10 MG/ML
40 INJECTION INTRAMUSCULAR; INTRAVENOUS ONCE
Status: COMPLETED | OUTPATIENT
Start: 2021-03-06 | End: 2021-03-06

## 2021-03-06 RX ORDER — SPIRONOLACTONE 50 MG/1
100 TABLET, FILM COATED ORAL DAILY
Status: DISCONTINUED | OUTPATIENT
Start: 2021-03-06 | End: 2021-03-09 | Stop reason: HOSPADM

## 2021-03-06 RX ORDER — LORAZEPAM 0.5 MG/1
0.5 TABLET ORAL EVERY 8 HOURS PRN
Status: DISCONTINUED | OUTPATIENT
Start: 2021-03-06 | End: 2021-03-09 | Stop reason: HOSPADM

## 2021-03-06 RX ORDER — FUROSEMIDE 10 MG/ML
40 INJECTION INTRAMUSCULAR; INTRAVENOUS ONCE
Status: DISCONTINUED | OUTPATIENT
Start: 2021-03-06 | End: 2021-03-08

## 2021-03-06 RX ADMIN — LACTULOSE 20 G: 10 SOLUTION ORAL at 09:47

## 2021-03-06 RX ADMIN — HEPARIN SODIUM 5000 UNITS: 5000 INJECTION INTRAVENOUS; SUBCUTANEOUS at 06:07

## 2021-03-06 RX ADMIN — FUROSEMIDE 40 MG: 10 INJECTION, SOLUTION INTRAMUSCULAR; INTRAVENOUS at 00:05

## 2021-03-06 RX ADMIN — PANTOPRAZOLE SODIUM 40 MG: 40 TABLET, DELAYED RELEASE ORAL at 06:07

## 2021-03-06 RX ADMIN — LACTULOSE 20 G: 10 SOLUTION ORAL at 17:00

## 2021-03-06 RX ADMIN — LORAZEPAM 0.5 MG: 0.5 TABLET ORAL at 10:39

## 2021-03-06 RX ADMIN — FERROUS SULFATE TAB 325 MG (65 MG ELEMENTAL FE) 325 MG: 325 (65 FE) TAB at 09:46

## 2021-03-06 RX ADMIN — POTASSIUM CHLORIDE 20 MEQ: 1500 TABLET, EXTENDED RELEASE ORAL at 00:05

## 2021-03-06 RX ADMIN — FOLIC ACID TAB 400 MCG 400 MCG: 400 TAB at 09:46

## 2021-03-06 RX ADMIN — SPIRONOLACTONE 100 MG: 50 TABLET, FILM COATED ORAL at 09:46

## 2021-03-06 RX ADMIN — ESCITALOPRAM OXALATE 10 MG: 10 TABLET ORAL at 21:07

## 2021-03-06 RX ADMIN — MELATONIN TAB 3 MG 6 MG: 3 TAB at 21:08

## 2021-03-06 RX ADMIN — RIFAXIMIN 550 MG: 550 TABLET ORAL at 09:47

## 2021-03-06 RX ADMIN — HEPARIN SODIUM 5000 UNITS: 5000 INJECTION INTRAVENOUS; SUBCUTANEOUS at 00:05

## 2021-03-06 RX ADMIN — CYANOCOBALAMIN TAB 500 MCG 1000 MCG: 500 TAB at 09:46

## 2021-03-06 RX ADMIN — TRAZODONE HYDROCHLORIDE 50 MG: 50 TABLET ORAL at 21:08

## 2021-03-06 RX ADMIN — LORAZEPAM 0.5 MG: 0.5 TABLET ORAL at 21:08

## 2021-03-06 RX ADMIN — HYDROXYZINE HYDROCHLORIDE 50 MG: 25 TABLET, FILM COATED ORAL at 00:05

## 2021-03-06 RX ADMIN — RIFAXIMIN 550 MG: 550 TABLET ORAL at 21:08

## 2021-03-06 RX ADMIN — THIAMINE HCL TAB 100 MG 100 MG: 100 TAB at 09:46

## 2021-03-06 RX ADMIN — FUROSEMIDE 40 MG: 10 INJECTION, SOLUTION INTRAMUSCULAR; INTRAVENOUS at 17:00

## 2021-03-06 RX ADMIN — LACTULOSE 20 G: 10 SOLUTION ORAL at 21:07

## 2021-03-06 NOTE — ASSESSMENT & PLAN NOTE
Patient present with acute onset SOB in the setting of worsening abdominal distension and b/l LE pitting edema  Patient presenting from [place] where she states she was not receiving her lactulose and has not had a BM in 2 day  Unclear etiology of SOB but differential includes anemia vs acute decompensated heart failure vs hepatopulmonary syndrome  On arrival, hemoglobin 7 6 and then dropped to 6 5  Patient now s/p 1u pRBCs  Presence of abdominal distension with liver dopplers demonstrating presence of cirrhosis, portal HTN with dilation of main portal vein, splenomegaly and gallstones  NT-proBNP 246 and Echo with an EF of 60%  CXR demonstrating pulmonary vascular congestion      Plan:  · Lasix 60 mg qdaily  · Aldactone 100 mg qdaily  · Strict ins and outs  · Daily weights  · Diet: CV 2 gram Na+ and 2000 mL fluid restriction

## 2021-03-06 NOTE — ED NOTES
Pt very persistent on being transferred to [facility]  Dr Jessi Ferguson at bedside to explain to pt why unable to be transferred at this time       Lila Troy RN  03/05/21 4516

## 2021-03-06 NOTE — QUICK NOTE
Patient is supposed to get IV Lasix 40 mg at the middle of the transfusion and 40 mg at the end of the transfusion  However patient only got 1 dose of IV Lasix 40 mg around 5:00 p m  Severiano Sun Patient is not having shortness of breath  Saturating well on room air  Will hold off IV Lasix for now  If needed, will give IV Lasix tomorrow

## 2021-03-06 NOTE — PLAN OF CARE
Problem: PHYSICAL THERAPY ADULT  Goal: Performs mobility at highest level of function for planned discharge setting  See evaluation for individualized goals  Description:            See flowsheet documentation for full assessment, interventions and recommendations  Note: Prognosis: Good  Problem List: Decreased endurance, Impaired balance, Decreased mobility  Assessment: Pt  is a 52 yo F who presents Alcholic cirrhosis, SOB and dyspnea on exertion  order placed for PT eval and tx, w/ activity order of up w/ A  pt presents w/ comorbidities of SOB, thrombcytopenia, macrocytic anemia, hyperglycemia, acute cystitis and personal factors of depression and inability to perform IADLs  pt presents w/ decreased endurance, impaired balance, gait deviations, decreased safety awareness and fall risk  these impairments are evident in findings from physical examination (weakness, decreased ROM and impaired coordination), mobility assessment (need for supervision assist w/ all phases of mobility when usually mobilizing independently, tolerance to only 200 feet of ambulation and need for cueing for mobility technique), and AM-PAC 6-Clicks: 10/08  pt needed input for task focus and mobility technique  pt is at risk for falls due to physical and safety awareness deficits  pt's clinical presentation is unstable/unpredictable (evident in need for assist w/ all phases of mobility when usually mobilizing independently, tolerance to only 200 feet of ambulation, need for input for mobility technique, need for input for task focus and mobility technique and need for input for mobility technique/safety)  D/C IP PT  discharge recommendation is for return to facility with PT consultation to reduce fall risk and maximize level of functional independence  PT Discharge Recommendation: Other (Comment)(Return to Facility with PT Evaluation for Balance)     PT - OK to Discharge: Yes    See flowsheet documentation for full assessment

## 2021-03-06 NOTE — ASSESSMENT & PLAN NOTE
Platelets of 58 occurring in the setting of cirrhosis  Patient currently stable without signs of active bleeding  Platelets 53 today      Plan:  · Continue to monitor with daily CBC  · Transfuse for platelets <55,977

## 2021-03-06 NOTE — ASSESSMENT & PLAN NOTE
History of alcoholic cirrhosis  Follows with gastroenterologist, Dr Ted Torres with [facility], as an outpatient     · Etiology: Alcohol, abstinent since 1 year   · Stage: 3 (+/-) non-bleeding varices, (+ascites)   · Compensated: No; DECOMPENSATED due to history of variceal bleeding, Ascites, Hepatic Encephalopathy and Jaundice  · Orval Ao Score: Class C   · MELD: 18 (has not started transplant evaluation yet)  · [facility]  Screening: Up to date  · Ultrasound RUQ with liver dopplers: Cirrhosis with nodular contour, ascites, evidence of portal hypertension with dilation of main portal vein, splenomegaly, gallstones without sonographic Weiss sign, and wall thickening and pericholecystic fluid possibly related to hepatic disease  · Variceal screening: Up to date; h/o variceal bleeding- small varices noted on EGD and severe portal hypertensive gastropathy- done on 11/02  · Varices intervention: Patient is not on Beta blockers because of history of bradycardia and hypotension  · Ascites intervention: reports that she had a paracentesis almost 1 5 years ago with 6L takes out    Plan:  · Ascites:  · Spirolactone 100 mg qdaily  · Furosemide 60 mg qdaily  · IR paracentesis with labs to r/o SBP; ordered and pending  · Hepatic Encephalopathy:  · Lactulose 30g TID; titrate to produce 3 BM/day  · Rifaximin 550 mcg BID

## 2021-03-06 NOTE — ED PROVIDER NOTES
History  Chief Complaint   Patient presents with    Shortness of Breath     patient states she started having shortness of breath last night as well as some confusion  Patient states she also has abdominal pain and swollen legs/ankles  Devante Elizondo is a 53year-old woman with alcoholic cirrhosis on a transplant list, history of varices s/p banding, HTN, presenting with shortness of breath, abdominal distension, and decreased bowel movements over the last 2 days  She is supposed to have 4 bowel movements per day and has been only having about 2, her last being yesterday afternoon  She feels short of breath and a discomfort in her chest that she describes as something pushing on her diaphragm  She has significant lower extremity edema that is slightly worse than usual  She reports being more confused, "feel like she is drunk " She reports no fevers, chills, nausea, chills, blood in stool, jaw/shoulder/arm pain, other recent changes in medications  Her last alcoholic drink was over 1 year ago  She has had esophageal varices with banding in the past, reports last bleeding about 1 year ago  She used to receive regular paracentesis, last in this past May when she was receiving it weekly, she no longer receives it regularly  Prior to Admission Medications   Prescriptions Last Dose Informant Patient Reported? Taking?    Melatonin 5 MG TABS   Yes Yes   Sig: Take 5 mg by mouth   SALINE NASAL SPRAY NA   Yes Yes   Sig: into each nostril   acetaminophen (TYLENOL) 325 mg tablet  Outside Facility (Specify) Yes Yes   Sig: Take 650 mg by mouth as needed for mild pain   bisacodyl (DULCOLAX) 10 mg suppository  Outside Facility (Specify) Yes Yes   Sig: Insert 10 mg into the rectum as needed for constipation   cyanocobalamin (VITAMIN B-12) 1000 MCG tablet  Outside Facility (Specify) Yes Yes   Sig: Take 1,000 mcg by mouth daily   escitalopram (LEXAPRO) 10 mg tablet  Outside Facility (Specify) Yes Yes   Sig: Take 10 mg by mouth daily   ferrous sulfate 325 (65 Fe) mg tablet Not Taking at Unknown time  Yes No   Sig: Take 325 mg by mouth daily with breakfast   folic acid (FOLVITE) 1 mg tablet   Yes No   Sig: Take 400 mcg by mouth daily   furosemide (LASIX) 20 mg tablet   Yes No   Sig: Take 60 mg by mouth 2 (two) times a day    iron polysaccharides (FERREX) 150 mg capsule   Yes Yes   Sig: Take 150 mg by mouth 2 (two) times a day   lactulose (CEPHULAC) 20 g packet   No No   Sig: Take 1 packet (20 g total) by mouth daily   Patient taking differently: Take 20 g by mouth 2 (two) times a day    methocarbamol (ROBAXIN) 500 mg tablet   Yes No   Sig: Take 750 mg by mouth daily at bedtime as needed for muscle spasms    mirtazapine (REMERON) 30 mg tablet   Yes No   Sig: Take 30 mg by mouth daily at bedtime   ondansetron (ZOFRAN) 4 mg tablet   Yes Yes   Sig: Take 4 mg by mouth every 8 (eight) hours as needed for nausea or vomiting   pantoprazole (PROTONIX) 40 mg tablet   Yes Yes   Sig: Take 40 mg by mouth 2 (two) times a day   polyethylene glycol (MIRALAX) 17 g packet  Outside Facility (Specify) Yes Yes   Sig: Take 17 g by mouth daily   potassium chloride (MICRO-K) 10 MEQ CR capsule  Outside Facility (Specify) Yes Yes   Sig: Take 40 mEq by mouth 3 (three) times a day   rifaximin (XIFAXAN) 550 mg tablet   Yes Yes   Sig: Take 550 mg by mouth 2 (two) times a day   spironolactone (ALDACTONE) 25 mg tablet   Yes No   Sig: Take 150 mg by mouth daily Total of 150mg   thiamine 100 MG tablet Unknown at Unknown time  Yes No   Sig: Take 100 mg by mouth daily   traZODone (DESYREL) 50 mg tablet   Yes No   Sig: Take 50 mg by mouth daily at bedtime   triamcinolone (KENALOG) 0 5 % cream   Yes Yes   Sig: Apply topically 3 (three) times a day   trimethobenzamide (Tigan) 300 mg capsule   Yes No   Sig: Take 300 mg by mouth as needed for nausea   vitamin E, tocopherol, 200 units capsule   Yes Yes   Sig: Take 200 Units by mouth daily   zinc sulfate (ZINCATE) 220 mg capsule   Yes Yes   Sig: Take 110 mg by mouth daily      Facility-Administered Medications: None       Past Medical History:   Diagnosis Date    Alcohol abuse     Cirrhosis ([facility] )     Hypertension        History reviewed  No pertinent surgical history  History reviewed  No pertinent family history  I have reviewed and agree with the history as documented  E-Cigarette/Vaping     E-Cigarette/Vaping Substances     Social History     Tobacco Use    Smoking status: Former Smoker     Types: Cigarettes     Quit date:      Years since quittin 1    Smokeless tobacco: Never Used   Substance Use Topics    Alcohol use: Not Currently     Comment: currently 28 days sober    Drug use: Never        Review of Systems   Constitutional: Positive for fatigue  Negative for chills and fever  Respiratory: Positive for shortness of breath  Negative for cough  Cardiovascular: Positive for chest pain and leg swelling  Negative for palpitations  Gastrointestinal: Positive for abdominal pain and diarrhea  Negative for blood in stool, nausea and vomiting  Psychiatric/Behavioral: Positive for confusion  All other systems reviewed and are negative  Physical Exam  ED Triage Vitals [21 1831]   Temperature Pulse Respirations Blood Pressure SpO2   98 1 °F (36 7 °C) 76 18 140/67 98 %      Temp Source Heart Rate Source Patient Position - Orthostatic VS BP Location FiO2 (%)   Oral Monitor Lying Right arm --      Pain Score       5             Orthostatic Vital Signs  Vitals:    21 1831 21 2227   BP: 140/67 128/67   Pulse: 76 77   Patient Position - Orthostatic VS: Lying        Physical Exam  Vitals signs reviewed  Constitutional:       General: She is not in acute distress  Appearance: She is obese  She is ill-appearing  She is not toxic-appearing  HENT:      Head: Normocephalic and atraumatic        Right Ear: External ear normal       Left Ear: External ear normal       Mouth/Throat: Mouth: Mucous membranes are moist    Eyes:      Extraocular Movements: Extraocular movements intact  Pupils: Pupils are equal, round, and reactive to light  Comments: No nystagmus  Very mild scleral icterus  Neck:      Vascular: No JVD  Cardiovascular:      Rate and Rhythm: Normal rate and regular rhythm  Pulses: Normal pulses  Comments: Blowing systolic murmur  Pulmonary:      Effort: Pulmonary effort is normal       Breath sounds: Normal breath sounds  Abdominal:      General: There is distension  Palpations: Abdomen is soft  Comments: RUQ tenderness  Very mild fluid wave  Musculoskeletal:      Right lower le+ Pitting Edema present  Left lower le+ Pitting Edema present  Comments: No asterixis  Skin:     General: Skin is warm and dry  Coloration: Skin is not jaundiced or pale  Neurological:      General: No focal deficit present  Mental Status: She is alert and oriented to person, place, and time     Psychiatric:         Mood and Affect: Mood normal          ED Medications  Medications   sodium chloride (PF) 0 9 % injection 3 mL (has no administration in time range)   escitalopram (LEXAPRO) tablet 10 mg (10 mg Oral Given 3/5/21 2257)   melatonin tablet 6 mg (6 mg Oral Given 3/5/21 2257)   rifaximin (XIFAXAN) tablet 550 mg (550 mg Oral Given 3/5/21 2256)   traZODone (DESYREL) tablet 50 mg (50 mg Oral Given 3/5/21 2257)   acetaminophen (TYLENOL) tablet 975 mg (975 mg Oral Given 3/5/21 2256)       Diagnostic Studies  Results Reviewed     Procedure Component Value Units Date/Time    Comprehensive metabolic panel [[id number]]  (Abnormal) Collected: 21    Lab Status: Final result Specimen: Blood from Arm, Left Updated: 21     Sodium 139 mmol/L      Potassium 3 6 mmol/L      Chloride 107 mmol/L      CO2 25 mmol/L      ANION GAP 7 mmol/L      BUN 12 mg/dL      Creatinine 0 73 mg/dL      Glucose 100 mg/dL      Calcium 7 9 mg/dL Corrected Calcium 9 1 mg/dL      AST 32 U/L      ALT 20 U/L      Alkaline Phosphatase 140 U/L      Total Protein 5 8 g/dL      Albumin 2 5 g/dL      Total Bilirubin 3 81 mg/dL      eGFR 97 ml/min/1 73sq m     Narrative:      [place] guidelines for Chronic Kidney Disease (CKD):     Stage 1 with normal or high GFR (GFR > 90 mL/min/1 73 square meters)    Stage 2 Mild CKD (GFR = 60-89 mL/min/1 73 square meters)    Stage 3A Moderate CKD (GFR = 45-59 mL/min/1 73 square meters)    Stage 3B Moderate CKD (GFR = 30-44 mL/min/1 73 square meters)    Stage 4 Severe CKD (GFR = 15-29 mL/min/1 73 square meters)    Stage 5 End Stage CKD (GFR <15 mL/min/1 73 square meters)  Note: GFR calculation is accurate only with a steady state creatinine    Ammonia [[id number]]  (Abnormal) Collected: 03/05/21 1914    Lab Status: Final result Specimen: Blood from Arm, Left Updated: 03/05/21 2007     Ammonia 47 umol/L     CBC and differential [[id number]]  (Abnormal) Collected: 03/05/21 1914    Lab Status: Final result Specimen: Blood from Arm, Left Updated: 03/05/21 2002     WBC 5 14 Thousand/uL      RBC 2 19 Million/uL      Hemoglobin 7 6 g/dL      Hematocrit 24 6 %       fL      MCH 34 7 pg      MCHC 30 9 g/dL      RDW 15 7 %      MPV 10 9 fL      Platelets 58 Thousands/uL     Troponin I [[id number]]  (Normal) Collected: 03/05/21 1914    Lab Status: Final result Specimen: Blood from Arm, Left Updated: 03/05/21 1953     Troponin I <0 02 ng/mL     Protime-INR [[id number]]  (Abnormal) Collected: 03/05/21 1914    Lab Status: Final result Specimen: Blood from Arm, Left Updated: 03/05/21 1945     Protime 19 8 seconds      INR 1 68    APTT [[id number]]  (Normal) Collected: 03/05/21 1914    Lab Status: Final result Specimen: Blood from Arm, Left Updated: 03/05/21 1945     PTT 36 seconds                  X-ray chest 1 view portable    (Results Pending)         Procedures  ECG 12 Lead Documentation Only    Date/Time: 3/5/2021 10:38 PM  Performed by: Billy Davis MD  Authorized by: Billy Davis MD     Indications / Diagnosis:  Dyspnea  ECG reviewed by me, the ED Provider: yes    Patient location:  ED  Previous ECG:     Previous ECG:  Compared to current    Comparison ECG info:  10/29/19    Similarity:  No change  Interpretation:     Interpretation: normal    Rate:     ECG rate:  74  Rhythm:     Rhythm: sinus rhythm    Ectopy:     Ectopy: none    QRS:     QRS axis:  Normal  Conduction:     Conduction: normal    ST segments:     ST segments:  Normal  T waves:     T waves: normal            ED Course  ED Course as of Mar 05 2259   Fri Mar 05, 2021   2024 Ammonia(!): 47   2025 Platelet Count(!): 58   2044 INR(!): 1 68   2045 Hemoglobin(!): 7 6   2103 Patient requesting transfer to [address] [place] [address]  PACS request entered  Discussed risks of transfer with patient, patient relays understanding and accepts risks  [address] to Dr Regan Simmons at [facility] AT THE [facility], discussed patient at length, discussed that transfer is largely at patient request and that our ED has not fully evaluated her and feels at this point in our workup that she would be suitable for observation  She agreed to transfer the patient to the ED  Ultimately, [facility] refused transfer  2236 Bedside US shows ascites, but no significant pockets to drain  2236 Patient discussed with SOD for admission  2254 Admitted to SOD for obs  MDM  Number of Diagnoses or Management Options  Cirrhosis of liver with ascites [facility]):   Dyspnea:   Edema:   Diagnosis management comments: 52 yo cirrhotic patient presenting with dyspnea, increasing pedal edema, and confusion  Will evaluate in ED with troponin, CXR, CMP, CBC, ammonia, and coags  Before evaluation was complete, patient was requesting transfer to [facility]   Transfer initiated, discussed with both internal medicine attending and ED attending, both refused transfer of patient  Patient given option of staying at [facility] or returning to [facility], and accepted admission at [facility] for further evaluation of dyspnea, edema, and confusion  Patient admitted to SOD  Disposition  Final diagnoses:   Dyspnea   Edema   Cirrhosis of liver with ascites ([address] )     Time reflects when diagnosis was documented in both MDM as applicable and the Disposition within this note     Time User Action Codes Description Comment    3/5/2021 10:36 PM Francois Collins Add [R06 00] Dyspnea     3/5/2021 10:36 PM Francois Dennis Add [R60 9] Edema     3/5/2021 10:36 PM Myesha Shaw [K74 [id number],  R18 8] Cirrhosis of liver with ascites [facility])       ED Disposition     ED Disposition Condition Date/Time Comment    Admit Stable Fri Mar 5, 2021 10:52 PM Case was discussed with SOD and the patient's admission status was agreed to be Admission Status: observation status to the service of Dr Wendy Peraza   Follow-up Information    None         Patient's Medications   Discharge Prescriptions    No medications on file     No discharge procedures on file  PDMP Review     None           ED Provider  Attending physically available and evaluated Jennifer Ruthliv LEDESMA managed the patient along with the ED Attending      Electronically Signed by         Jesus Vera MD  03/05/21 1893

## 2021-03-06 NOTE — PLAN OF CARE
Problem: Potential for Falls  Goal: Patient will remain free of falls  Description: INTERVENTIONS:  - Assess patient frequently for physical needs  -  Identify cognitive and physical deficits and behaviors that affect risk of falls    -  Peck fall precautions as indicated by assessment   - Educate patient/family on patient safety including physical limitations  - Instruct patient to call for assistance with activity based on assessment  - Modify environment to reduce risk of injury  - Consider OT/PT consult to assist with strengthening/mobility  Outcome: Progressing     Problem: PAIN - ADULT  Goal: Verbalizes/displays adequate comfort level or baseline comfort level  Description: Interventions:  - Encourage patient to monitor pain and request assistance  - Assess pain using appropriate pain scale  - Administer analgesics based on type and severity of pain and evaluate response  - Implement non-pharmacological measures as appropriate and evaluate response  - Consider cultural and social influences on pain and pain management  - Notify physician/advanced practitioner if interventions unsuccessful or patient reports new pain  Outcome: Progressing     Problem: INFECTION - ADULT  Goal: Absence or prevention of progression during hospitalization  Description: INTERVENTIONS:  - Assess and monitor for signs and symptoms of infection  - Monitor lab/diagnostic results  - Monitor all insertion sites, i e  indwelling lines, tubes, and drains  - Monitor endotracheal if appropriate and nasal secretions for changes in amount and color  - Peck appropriate cooling/warming therapies per order  - Administer medications as ordered  - Instruct and encourage patient and family to use good hand hygiene technique  - Identify and instruct in appropriate isolation precautions for identified infection/condition  Outcome: Progressing  Goal: Absence of fever/infection during neutropenic period  Description: INTERVENTIONS:  - Monitor WBC    Outcome: Progressing     Problem: SAFETY ADULT  Goal: Patient will remain free of falls  Description: INTERVENTIONS:  - Assess patient frequently for physical needs  -  Identify cognitive and physical deficits and behaviors that affect risk of falls    -  [place] fall precautions as indicated by assessment   - Educate patient/family on patient safety including physical limitations  - Instruct patient to call for assistance with activity based on assessment  - Modify environment to reduce risk of injury  - Consider OT/PT consult to assist with strengthening/mobility  Outcome: Progressing  Goal: Maintain or return to baseline ADL function  Description: INTERVENTIONS:  -  Assess patient's ability to carry out ADLs; assess patient's baseline for ADL function and identify physical deficits which impact ability to perform ADLs (bathing, care of mouth/teeth, toileting, grooming, dressing, etc )  - Assess/evaluate cause of self-care deficits   - Assess range of motion  - Assess patient's mobility; develop plan if impaired  - Assess patient's need for assistive devices and provide as appropriate  - Encourage maximum independence but intervene and supervise when necessary  - Involve family in performance of ADLs  - Assess for home care needs following discharge   - Consider OT consult to assist with ADL evaluation and planning for discharge  - Provide patient education as appropriate  Outcome: Progressing  Goal: Maintain or return mobility status to optimal level  Description: INTERVENTIONS:  - Assess patient's baseline mobility status (ambulation, transfers, stairs, etc )    - Identify cognitive and physical deficits and behaviors that affect mobility  - Identify mobility aids required to assist with transfers and/or ambulation (gait belt, sit-to-stand, lift, walker, cane, etc )  - [place] fall precautions as indicated by assessment  - Record patient progress and toleration of activity level on Mobility SBAR; progress patient to next Phase/Stage  - Instruct patient to call for assistance with activity based on assessment  - Consider rehabilitation consult to assist with strengthening/weightbearing, etc   Outcome: Progressing     Problem: DISCHARGE PLANNING  Goal: Discharge to home or other facility with appropriate resources  Description: INTERVENTIONS:  - Identify barriers to discharge w/patient and caregiver  - Arrange for needed discharge resources and transportation as appropriate  - Identify discharge learning needs (meds, wound care, etc )  - Arrange for interpretive services to assist at discharge as needed  - Refer to Case Management Department for coordinating discharge planning if the patient needs post-hospital services based on physician/advanced practitioner order or complex needs related to functional status, cognitive ability, or social support system  Outcome: Progressing     Problem: Knowledge Deficit  Goal: Patient/family/caregiver demonstrates understanding of disease process, treatment plan, medications, and discharge instructions  Description: Complete learning assessment and assess knowledge base    Interventions:  - Provide teaching at level of understanding  - Provide teaching via preferred learning methods  Outcome: Progressing     Problem: RESPIRATORY - ADULT  Goal: Achieves optimal ventilation and oxygenation  Description: INTERVENTIONS:  - Assess for changes in respiratory status  - Assess for changes in mentation and behavior  - Position to facilitate oxygenation and minimize respiratory effort  - Oxygen administered by appropriate delivery if ordered  - Initiate smoking cessation education as indicated  - Encourage broncho-pulmonary hygiene including cough, deep breathe, Incentive Spirometry  - Assess the need for suctioning and aspirate as needed  - Assess and instruct to report SOB or any respiratory difficulty  - Respiratory Therapy support as indicated  Outcome: Progressing     Problem: GASTROINTESTINAL - ADULT  Goal: Minimal or absence of nausea and/or vomiting  Description: INTERVENTIONS:  - Administer IV fluids if ordered to ensure adequate hydration  - Maintain NPO status until nausea and vomiting are resolved  - Nasogastric tube if ordered  - Administer ordered antiemetic medications as needed  - Provide nonpharmacologic comfort measures as appropriate  - Advance diet as tolerated, if ordered  - Consider nutrition services referral to assist patient with adequate nutrition and appropriate food choices  Outcome: Progressing  Goal: Maintains or returns to baseline bowel function  Description: INTERVENTIONS:  - Assess bowel function  - Encourage oral fluids to ensure adequate hydration  - Administer IV fluids if ordered to ensure adequate hydration  - Administer ordered medications as needed  - Encourage mobilization and activity  - Consider nutritional services referral to assist patient with adequate nutrition and appropriate food choices  Outcome: Progressing  Goal: Maintains adequate nutritional intake  Description: INTERVENTIONS:  - Monitor percentage of each meal consumed  - Identify factors contributing to decreased intake, treat as appropriate  - Assist with meals as needed  - Monitor I&O, weight, and lab values if indicated  - Obtain nutrition services referral as needed  Outcome: Progressing     Problem: METABOLIC, FLUID AND ELECTROLYTES - ADULT  Goal: Electrolytes maintained within normal limits  Description: INTERVENTIONS:  - Monitor labs and assess patient for signs and symptoms of electrolyte imbalances  - Administer electrolyte replacement as ordered  - Monitor response to electrolyte replacements, including repeat lab results as appropriate  - Instruct patient on fluid and nutrition as appropriate  Outcome: Progressing  Goal: Fluid balance maintained  Description: INTERVENTIONS:  - Monitor labs   - Monitor I/O and WT  - Instruct patient on fluid and nutrition as appropriate  - Assess for signs & symptoms of volume excess or deficit  Outcome: Progressing  Goal: Glucose maintained within target range  Description: INTERVENTIONS:  - Monitor Blood Glucose as ordered  - Assess for signs and symptoms of hyperglycemia and hypoglycemia  - Administer ordered medications to maintain glucose within target range  - Assess nutritional intake and initiate nutrition service referral as needed  Outcome: Progressing

## 2021-03-06 NOTE — UTILIZATION REVIEW
Initial Clinical Review    Admission: Date/Time/Statement:   Admission Orders (From admission, onward)     Ordered        03/05/21 1397  Place in Observation  Once                   Orders Placed This Encounter   Procedures    Place in Observation     Standing Status:   Standing     Number of Occurrences:   1     Order Specific Question:   Level of Care     Answer:   Med Surg [16]     ED Arrival Information     Expected Arrival Acuity Means of Arrival Escorted By Service Admission Type    - 3/5/2021 18:17 Urgent Ambulance [facility] Urgent    Arrival Complaint    abd pain        Chief Complaint   Patient presents with    Shortness of Breath     patient states she started having shortness of breath last night as well as some confusion  Patient states she also has abdominal pain and swollen legs/ankles  Assessment/Plan:  52 y/o female with PMHx of decompensated alcoholic cirrhosis of the liver with jaundice, ascites, encephalopathy and h/o bleeding varices, diastolic heart failure, depression who presents from her nursing home with sob, increased abdominal distention and pain, lack of bowel movements, swollen legs and confusion with altered sleep cycles  Reports SOB started 1-2 days ago, and abd distention gradually getting worse  Also reports some confusion and sleep disturbance  Follows with GI and gets lactulose regularly at SNF  Also reports some leg swelling  Hgb 7 5, Hct 24 6, Plt 58  Ca 7 9, Alk phos 140  Ucx + e-coli  CXR with some pulm vasc congestion  Admit observation to M/S/Tele unit with alcoholic cirrhosis --  diuretics- spironolactone, furosemide, paracentesis  Consult IR for possible paracentesis  Lactulose, rifaximin- titrate lactulose to produce 3 BM's/day  Lasix IV x1, then continue home po diuretics and monitor       ED Triage Vitals [03/05/21 1831]   Temperature Pulse Respirations Blood Pressure SpO2   98 1 °F (36 7 °C) 76 18 140/67 98 %      Temp Source Heart Rate Source Patient Position - Orthostatic VS BP Location FiO2 (%)   Oral Monitor Lying Right arm --      Pain Score       5          Wt Readings from Last 1 Encounters:   03/06/21 79 2 kg (174 lb 9 7 oz)     Additional Vital Signs:   Date/Time  Temp  Pulse  Resp  BP  MAP (mmHg)  SpO2  O2 Device   03/06/21 0800  --  --  --  --  --  --  None (Room air)   03/06/21 07:30:16  98 7 °F (37 1 °C)  82  19  121/64  83  96 %  --   03/06/21 0300  --  --  --  --  --  --  None (Room air)   03/06/21 02:49:06  98 3 °F (36 8 °C)  72  18  131/66  88  97 %  --   03/06/21 0030  --  82  18  137/69  --  97 %  None (Room air)   03/05/21 2227  --  77  16  128/67  --  97 %  None (Room air)   03/05/21 1915  --  --  --  --  --  --  None (Room air)   03/05/21 1831  98 1 °F (36 7 °C)  76  18  140/67  107  98 %  None (Room air)       Pertinent Labs/Diagnostic Test Results:   EKG 3/5 -- NSR  CXR 3/6 -- Mild pulmonary vascular congestion    Results from last 7 days   Lab Units 03/06/21  0005   SARS-COV-2  Negative     Results from last 7 days   Lab Units 03/06/21  1003 03/06/21  0454 03/06/21  0005 03/05/21  1914   WBC Thousand/uL  --  3 51*  --  5 14   HEMOGLOBIN g/dL 7 0* 6 5*  --  7 6*   HEMATOCRIT % 22 4* 20 8*  --  24 6*   PLATELETS Thousands/uL  --  50* 55* 58*   NEUTROS ABS Thousands/µL  --  2 21  --   --        Results from last 7 days   Lab Units 03/06/21  0454 03/05/21  1914   SODIUM mmol/L 140 139   POTASSIUM mmol/L 3 4* 3 6   CHLORIDE mmol/L 107 107   CO2 mmol/L 25 25   ANION GAP mmol/L 8 7   BUN mg/dL 14 12   CREATININE mg/dL 0 75 0 73   EGFR ml/min/1 73sq m 94 97   CALCIUM mg/dL 7 7* 7 9*     Results from last 7 days   Lab Units 03/06/21  0454 03/05/21 1914   AST U/L 25 32   ALT U/L 17 20   ALK PHOS U/L 101 140*   TOTAL PROTEIN g/dL 5 0* 5 8*   ALBUMIN g/dL 2 1* 2 5*   TOTAL BILIRUBIN mg/dL 4 09* 3 81*   BILIRUBIN DIRECT mg/dL 1 73*  --    AMMONIA umol/L  --  47*     Results from last 7 days   Lab Units 03/06/21  0454 03/05/21 1914   GLUCOSE RANDOM mg/dL 108 100     Results from last 7 days   Lab Units 03/05/21 1914   TROPONIN I ng/mL <0 02     Results from last 7 days   Lab Units 03/05/21 1914   PROTIME seconds 19 8*   INR  1 68*   PTT seconds 36     Results from last 7 days   Lab Units 03/06/21  0638   TSH 3RD GENERATON uIU/mL 1 370     Results from last 7 days   Lab Units 03/05/21 1914   NT-PRO BNP pg/mL 246*     Results from last 7 days   Lab Units 03/06/21  0005   INFLUENZA A PCR  Negative   INFLUENZA B PCR  Negative   RSV PCR  Negative     ED Treatment:   Medication Administration from 03/05/2021 1817 to 03/06/2021 6792       Date/Time Order Dose Route Action     03/05/2021 2257 escitalopram (LEXAPRO) tablet 10 mg 10 mg Oral Given     03/05/2021 2257 melatonin tablet 6 mg 6 mg Oral Given     03/05/2021 2256 rifaximin (XIFAXAN) tablet 550 mg 550 mg Oral Given     03/05/2021 2257 traZODone (DESYREL) tablet 50 mg 50 mg Oral Given     03/05/2021 2256 acetaminophen (TYLENOL) tablet 975 mg 975 mg Oral Given     03/06/2021 0005 potassium chloride (K-DUR,KLOR-CON) CR tablet 20 mEq 20 mEq Oral Given     03/06/2021 0005 heparin (porcine) subcutaneous injection 5,000 Units 5,000 Units Subcutaneous Given     03/06/2021 0005 hydrOXYzine HCL (ATARAX) tablet 50 mg 50 mg Oral Given     03/06/2021 0005 furosemide (LASIX) injection 40 mg 40 mg Intravenous Given     Past Medical History:   Diagnosis Date    Alcohol abuse     Cirrhosis ([place] [address] )     Hypertension      Present on Admission:   Alcoholic cirrhosis ([place] [address] )      Admitting Diagnosis: Edema [R60 9]  Dyspnea [R06 00]  SOB (shortness of breath) [R06 02]  Cirrhosis of liver with ascites ([facility] ) [K74 60, R18 8]  Age/Sex: 53 y o  female  Admission Orders:  Scheduled Medications:  cyanocobalamin, 1,000 mcg, Oral, Daily  escitalopram, 10 mg, Oral, Daily  ferrous sulfate, 325 mg, Oral, Daily With Breakfast  folic acid, 884 mcg, Oral, Daily  furosemide, 40 mg, Intravenous, Once  furosemide, 40 mg, Intravenous, Once  [START ON 3/7/2021] furosemide, 60 mg, Oral, Daily  heparin (porcine), 5,000 Units, Subcutaneous, Q8H CHIKA  lactulose, 20 g, Oral, Once  lactulose, 20 g, Oral, TID  melatonin, 6 mg, Oral, HS  pantoprazole, 40 mg, Oral, Early Morning  polyethylene glycol, 17 g, Oral, Daily  rifaximin, 550 mg, Oral, BID  spironolactone, 100 mg, Oral, Daily  thiamine, 100 mg, Oral, Daily  traZODone, 50 mg, Oral, HS      PRN Meds:  LORazepam, 0 5 mg, Oral, Q8H PRN  sodium chloride (PF), 3 mL, Intravenous, Q1H PRN      Network Utilization Review Department  ATTENTION: Please call with any questions or concerns to [phone] and carefully listen to the prompts so that you are directed to the right person  All voicemails are confidential   Berniece Crystal all requests for admission clinical reviews, approved or denied determinations and any other requests to dedicated fax number below belonging to the campus where the patient is receiving treatment   List of dedicated fax numbers for the Facilities:  [address] DENIALS (Administrative/Medical Necessity) [phone]   [address] (Maternity/NICU/Pediatrics) [phone]   [address] Donnie Cain 6291 (  Wilton Ashraf "[address] 103) [address] [place] [address] Aren Henderson 1481 [address] [place] [address] [phone]

## 2021-03-06 NOTE — ASSESSMENT & PLAN NOTE
Patient presenting with a MCV of 112  Per chart review, this appears's chronic in nature ( in 10/2019) but patient is now symptomatic and complaining of SOB  Given TSH WNL, elevated vitamin B12 of 2,699 and folate of >20 0, macrocytosis likely secondary to chronic alcoholism and liver dysfunction 2/2 cirrhosis  However given evidence of pancytopenia, could consider workup for MDS       Plan:  · Vitamin C58 1598 mcg  · Folic Acid 602 mcg   · Follow-up reticulocyte count  · Can consider workup for MDS

## 2021-03-06 NOTE — OCCUPATIONAL THERAPY NOTE
Occupational Therapy Evaluation      Judithsantiago Fontanez    3/6/2021    Principal Problem:    Alcoholic cirrhosis ([facility] )  Active Problems:    Depression    Shortness of breath    Thrombocytopenia (HCC)    Macrocytic anemia      Past Medical History:   Diagnosis Date    Alcohol abuse     [facility]     Hypertension        History reviewed  No pertinent surgical history  03/06/21 0915   OT Last Visit   OT Visit Date 03/06/21   Note Type   Note type Evaluation   Restrictions/Precautions   Weight Bearing Precautions Per Order No   Other Precautions Cognitive;Multiple lines; Fall Risk;Pain   Pain Assessment   Pain Assessment Tool 0-10   Pain Score 4   Pain Location/Orientation Location: Generalized  ("all over")   Effect of Pain on Daily Activities limits participation in meaningful daily activites   Patient's Stated Pain Goal No pain   Hospital Pain Intervention(s) Repositioned; Ambulation/increased activity; Emotional support   Home Living   Type of Home SNF  ([facility])   Home Layout One level   Bathroom Shower/Tub Walk-in shower   Bathroom Toilet Raised   Bathroom Equipment Grab bars in shower; Shower chair;Grab bars around toilet   [address]   (denies DME)   Prior Function   Level of [place] Independent with ADLs and functional mobility; Needs assistance with IADLs   Lives With Facility staff   Receives Help From Personal care attendant   ADL Assistance Independent   IADLs Needs assistance   Falls in the last 6 months 0   Vocational Retired   Comments Pt reports she has been at [facility] for [facility], initially requiring rehab, now is a resident "but I don't plan to stay there "   Lifestyle   Autonomy Pt reports being IND w/ all ADLS and facility staff A w/ all IADLS, including med management; denies use of DME PTA   Reciprocal Relationships Pt receives A w/ IADLS from facility staff   Pt reports she is unable to return home due to her significant other being a truck  "and is rarely home " Questionable why Pt has been a resident at [facility] for >8 months while not receiving rehab services? Service to Others Pt is unemployed   Intrinsic Gratification Pt reports enjoying reading and socializing   Psychosocial   Psychosocial (WDL) X   Patient Behaviors/Mood Flat affect   ADL   Where Assessed Edge of bed   Eating Assistance 7  Independent   Grooming Assistance 7  Independent   UB Bathing Assistance 5  Supervision/Setup   LB Bathing Assistance 5  Supervision/Setup   UB Dressing Assistance 5  Supervision/Setup   LB Dressing Assistance 5  Supervision/Setup   Toileting Assistance  5  Supervision/Setup   Bed Mobility   Supine to Sit 6  Modified independent   Additional items Bedrails   Sit to Supine 6  Modified independent   Additional items Bedrails   Additional Comments Pt laying supine in bed upon OT arrival  Pt returned laying supine in bed w/ all needs in reach s/p OT session  Transfers   Sit to Stand 6  Modified independent   Additional items Bedrails   Stand to Sit 6  Modified independent   Additional items Bedrails   Additional Comments Transfers w/o AD   Functional Mobility   Functional Mobility 5  Supervision   Additional Comments Pt demonstrated long distance household mobility in hallway w/o AD at S level  Additional items   (none)   Balance   Static Sitting Good   Dynamic Sitting Good   Static Standing Good   Dynamic Standing Fair   Ambulatory Fair   Activity Tolerance   Activity Tolerance Patient limited by pain; Patient limited by fatigue   Medical Staff Made Aware PT Alec Farnsworth   Nurse Made Aware RN cleared Pt for OT session   RUE Assessment   RUE Assessment WFL   LUE Assessment   LUE Assessment WFL   Hand Function   Gross Motor Coordination Functional   Fine Motor Coordination Functional   Sensation   Light Touch No apparent deficits   Vision-Basic Assessment   Current Vision No visual deficits   Vision - Complex Assessment   Ocular Range of Motion [facility] Cognition   Overall Cognitive Status WFL   Arousal/Participation Alert; Cooperative   Attention Attends with cues to redirect   Orientation Level Oriented X4   Memory Within functional limits   Following Commands Follows one step commands with increased time or repetition   Comments Pt is pleasant and cooperative to participate in therapy this day  Pt at times presents tangential and requires VC for redirection to task  Pt reports she feels that her confusion has improved  No cog deficits noted at this time  Assessment   Limitation Decreased endurance   Prognosis Good   Assessment Pt is a 52 yo female seen for OT eval s/p adm to [facility] on 3/5/21 w/ SOB, increased abd distention and pain, lack of bowel movements, LE edema, confusion, and altered sleep cycles dx'd w/ alcoholic cirrhosis  Pt  has a past medical history of Alcohol abuse, Cirrhosis ([address] ), and Hypertension  Pt with active OT orders and up with assistance  orders  Pt is a "short term resident" at [facility]  Pt was I w/  ADLS and staff A w/ IADLS, does not drive recently, & required no use of DME PTA  Pt is currently demonstrating the following occupational deficits: S-IND all self care, Mod I transfers, S functional mobility w/o AD  These deficits that are impacting pt's baseline areas of occupation are a result of the following impairments: pain, endurance and activity tolerance  Based on the aforementioned OT evaluation, functional performance deficits, and assessments, pt has been identified as a moderate complexity evaluation  The patient's raw score on the AM-PAC Daily Activity inpatient short form is 20, standardized score is 42 03, greater than 39 4  Patients at this level are likely to benefit from discharge to home  Please refer to the recommendation of the Occupational Therapist for safe discharge planning  Per CM and Pt, Plan is to return to facility w/ support from staff  upon D/C  Pt appears to be functioning at/close to baseline levels   No further acute care OT needs at this time  Will D/C OT  Please re-consult if appropriate  Recommend continued engagement in all meaningful daily activities w/ nursing & restorative staff during hospital stay      Goals   Patient Goals To get medical answers   Recommendation   OT Discharge Recommendation Other (Comment)  (per CM and Pt, plan is to return to facility )   OT - OK to Discharge Yes  (when medically cleared)   AM-PAC Daily Activity Inpatient   Lower Body Dressing 3   Bathing 3   Toileting 3   Upper Body Dressing 3   Grooming 4   Eating 4   Daily Activity Raw Score 20   Daily Activity Standardized Score (Calc for Raw Score >=11) 42 03   AM-PAC Applied Cognition Inpatient   Following a Speech/Presentation 3   Understanding Ordinary Conversation 4   Taking Medications 3   Remembering Where Things Are Placed or Put Away 3   Remembering List of 4-5 Errands 3   Taking Care of Complicated Tasks 3   Applied Cognition Raw Score 19   Applied Cognition Standardized Score 39 77   Modified [place] Scale   Modified [place] Scale 2       Marvin Bliss MS, OTR/L

## 2021-03-06 NOTE — CASE MANAGEMENT
LOS 1  Unplanned readmission risk score: N/A  Not a bundle  CM was informed that pt was admitted from [facility]  CM called [facility] ans spoke to nurse Sindy Handy to discuss prior level of functioning  Sindy Handy states pt performed ADL's indptly pta, no use of DME  Staff administers medications  Met with pt to discuss dc plan  Pt states plan is to return to [facility] when medically stable  A post acute care recommendation was made by your care team for STR  Discussed Freedom of Choice with patient  Choice is to return/continue services  ECIN referral sent to [facility]  Contact: Suzanne Baum (significant other) [phone]  Pt will need transport at [place]  No POA or living will  CM reviewed d/c planning process including the following: identifying help at home, patient preference for d/c planning needs, Discharge Lounge, [facility] Meds to Bed program, availability of treatment team to discuss questions or concerns patient and/or family may have regarding understanding medications and recognizing signs and symptoms once discharged  CM also encouraged patient to follow up with all recommended appointments after discharge  Patient advised of importance for patient and family to participate in managing patients medical well being  Patient/caregiver received discharge checklist  Content reviewed  Patient/caregiver encouraged to participate in discharge plan of care prior to discharge home

## 2021-03-06 NOTE — ASSESSMENT & PLAN NOTE
History of Depression:     Plan:  · Lexapro 10 mg daily  · Trazodone 50 mg qHS  · Melatonin 6 mg qdaily  · Ativan 0 5 mg q8 hours PRN

## 2021-03-06 NOTE — PHYSICAL THERAPY NOTE
PHYSICAL THERAPY Evaluation NOTE    Patient Name: Josh Gonzlaez  DJDCD'L Date: 3/6/2021     AGE:   53 y o   Mrn:   [id number]  ADMIT DX:  Edema [R60 9]  Dyspnea [R06 00]  SOB (shortness of breath) [R06 02]  Cirrhosis of liver with ascites ([facility] ) [K74 60, R18 8]    Past Medical History:   Diagnosis Date    Alcohol abuse     Cirrhosis ([facility] )     Hypertension      Length Of Stay: 0  PHYSICAL THERAPY EVALUATION :    03/06/21 0916   PT Last Visit   PT Visit Date 03/06/21   Note Type   Note type Evaluation   Pain Assessment   Pain Assessment Tool 0-10   Pain Score 4   Pain Location/Orientation Location: Generalized   Hospital Pain Intervention(s) Repositioned; Ambulation/increased activity; Emotional support   Home Living   Type of Home SNF  (Pt  reports is a "short term" resident at [place])   Home Layout Elevator;Ramped entrance   [place] Shower/Tub Walk-in shower   Bathroom Toilet Raised   [address] bars in shower;Built-in shower seat;Grab bars around toilet   [address] [facility]   (NO AD)   Additional Comments Pt  resident from [facility] Function   Level of [place] Independent with ADLs and functional mobility; Needs assistance with IADLs   Lives With Facility staff   Receives Help From Personal care attendant   ADL Assistance Independent   IADLs Needs assistance   Falls in the last 6 months 0   Comments PTA, Pt  reports INDEP with ADLs, assistance for IADLs and functional mobility without an AD   Restrictions/Precautions   Weight Bearing Precautions Per Order No   Other Precautions Fall Risk   General   Additional Pertinent History Pt  is a 54 yo F who presents Alcholic cirrhosis, SOB and dyspnea on exertion   Family/Caregiver Present No   Cognition   Overall Cognitive Status WFL   Arousal/Participation Cooperative   Orientation Level Oriented X4   Memory Within functional limits   Following Commands Follows multistep commands with increased time or repetition   Comments Pt  was identified with full name and birthdate   RLE Assessment   RLE Assessment   (functionally > 3+/5)   LLE Assessment   LLE Assessment   (functionally > 3+/5)   Coordination   Movements are Fluid and Coordinated 0   Coordination and Movement Description postural and gait    Bed Mobility   Supine to Sit 6  Modified independent   Sit to Supine 6  Modified independent   Transfers   Sit to Stand 6  Modified independent   Stand to Sit 6  Modified independent   Additional Comments 5x STS= 19 seconds, patient is currently at a fall risk based on this measure   Ambulation/Elevation   Gait pattern Narrow RAGHU; Forward Flexion;Decreased foot clearance;Shuffling; Inconsistent yisel; Redundant gait at times; Short stride; Step to;Excessively slow   Gait Assistance 5  Supervision   Additional items Verbal cues  (for posture and gait mechanics)   Assistive Device None   Distance 200'x1   Balance   Static Sitting Fair +   Dynamic Sitting Fair   Static Standing Fair -   Dynamic Standing Poor +   Ambulatory Poor +   Endurance Deficit   Endurance Deficit Yes   Endurance Deficit Description postural and gait degradation with faitigue   Activity Tolerance   Activity Tolerance Patient limited by fatigue   Medical Staff Made Aware Spoke to CM    Nurse Made Aware Spoke to RN   Assessment   Prognosis Good   Problem List Decreased endurance; Impaired balance;Decreased mobility   Assessment Pt  is a 54 yo F who presents Alcholic cirrhosis, SOB and dyspnea on exertion  order placed for PT eval and tx, w/ activity order of up w/ A  pt presents w/ comorbidities of SOB, thrombcytopenia, macrocytic anemia, hyperglycemia, acute cystitis and personal factors of depression and inability to perform IADLs  pt presents w/ decreased endurance, impaired balance, gait deviations, decreased safety awareness and fall risk   these impairments are evident in findings from physical examination (weakness, decreased ROM and impaired coordination), mobility assessment (need for supervision assist w/ all phases of mobility when usually mobilizing independently, tolerance to only 200 feet of ambulation and need for cueing for mobility technique), and AM-PAC 6-Clicks: 08/70  pt needed input for task focus and mobility technique  pt is at risk for falls due to physical and safety awareness deficits  pt's clinical presentation is unstable/unpredictable (evident in need for assist w/ all phases of mobility when usually mobilizing independently, tolerance to only 200 feet of ambulation, need for input for mobility technique, need for input for task focus and mobility technique and need for input for mobility technique/safety)  D/C IP PT  discharge recommendation is for return to facility with PT consultation to reduce fall risk and maximize level of functional independence  Goals   Patient Goals to get stronger   Recommendation   PT Discharge Recommendation Other (Comment)  (Return to Facility with PT Evaluation for Balance)   PT - OK to Discharge Yes   Additional Comments D/C IP PT  return to facility with PT consultation   [phone]   Turning in Bed Without Bedrails 4   Lying on Back to Sitting on Edge of Flat Bed 4   Moving Bed to Chair 4   Standing Up From Chair 4   Walk in Room 4   Climb 3-5 Stairs 3   Basic Mobility Inpatient Raw Score 23   Basic Mobility Standardized Score 50 88   D/C IP PT  Return to [facility] with PT evaluation for balance  The patient's AM-PAC Basic Mobility Inpatient Short Form Raw Score is 23, Standardized Score is 50  88  A standardized score greater than 42 9 suggests the patient may benefit from discharge to home (return to [facility] at facility)  Please also refer to the recommendation of the Physical Therapist for safe discharge planning      Madhu Tee, PT, DPT 3/6/2021

## 2021-03-06 NOTE — ED ATTENDING ATTESTATION
3/5/2021  IPolo MD, saw and evaluated the patient  I have discussed the patient with the resident/non-physician practitioner and agree with the resident's/non-physician practitioner's findings, Plan of Care, and MDM as documented in the resident's/non-physician practitioner's note, except where noted  All available labs and Radiology studies were reviewed  I was present for key portions of any procedure(s) performed by the resident/non-physician practitioner and I was immediately available to provide assistance  At this point I agree with the current assessment done in the Emergency Department  I have conducted an independent evaluation of this patient a history and physical is as follows:  43-year-old female with history of cirrhosis, on transplant list   Patient states that she has been having decreased bowel movements, and increasing shortness of breath  She notes increasing peripheral edema as well  Has not had fevers, chills, or vomiting  States that she feels more confused and more spacey  Patient denies black or bloody bowel movements  She does have a history of variceal bleeding in the past, but states that she has not had those symptoms recently  Denies syncope  Does complain of some chest tightness as well  Review of systems otherwise -12 systems reviewed  On exam the patient appears chronically ill  She is slightly pale  Her vital signs are stable  She has normal work of breathing  Her HEENT exam is remarkable for mild scleral icterus  She does have some conjunctival pallor  Oropharynx is clear with moist mucous membranes  Neck is supple and nontender  She has a large mass underlying her chin which he states is chronic and longstanding  The patient's heart is regular without murmurs, rubs, gallops  Her lungs are clear bilaterally with good air movement  Her abdomen is soft with mild distention  She has mild right upper quadrant tenderness    The patient has +2 pitting edema in her bilateral lower extremities  She is neurologically intact  She has an area of ecchymosis/petechiae along her right medial forearm, which she states it is new over the last several days and she does not know how she got it  She does not have petechiae elsewhere  She has a normal back exam   Impression:  Liver failure, global weakness, shortness of breath  Patient does not appear to be tensely ascitic, bedside ultrasound without a large pocket of fluid to drain  Will plan to do cardiac evaluation, check metabolic panel including ammonia, LFTs, anticipate admission for global weakness, observation in the setting of liver failure, shortness of breath, further cardiac evaluation  Reassessment:  Patient requesting to be transferred to [facility] in [facility], as that is her hospital of choice    Will plan to contact [facility] to attempt to arrange transfer at patient request  ED Course         Critical Care Time  Procedures

## 2021-03-06 NOTE — PLAN OF CARE
Problem: Potential for Falls  Goal: Patient will remain free of falls  Description: INTERVENTIONS:  - Assess patient frequently for physical needs  -  Identify cognitive and physical deficits and behaviors that affect risk of falls    -  [place] fall precautions as indicated by assessment   - Educate patient/family on patient safety including physical limitations  - Instruct patient to call for assistance with activity based on assessment  - Modify environment to reduce risk of injury  - Consider OT/PT consult to assist with strengthening/mobility  Outcome: Progressing     Problem: PAIN - ADULT  Goal: Verbalizes/displays adequate comfort level or baseline comfort level  Description: Interventions:  - Encourage patient to monitor pain and request assistance  - Assess pain using appropriate pain scale  - Administer analgesics based on type and severity of pain and evaluate response  - Implement non-pharmacological measures as appropriate and evaluate response  - Consider cultural and social influences on pain and pain management  - Notify physician/advanced practitioner if interventions unsuccessful or patient reports new pain  Outcome: Progressing     Problem: INFECTION - ADULT  Goal: Absence or prevention of progression during hospitalization  Description: INTERVENTIONS:  - Assess and monitor for signs and symptoms of infection  - Monitor lab/diagnostic results  - Monitor all insertion sites, i e  indwelling lines, tubes, and drains  - Monitor endotracheal if appropriate and nasal secretions for changes in amount and color  - [place] appropriate cooling/warming therapies per order  - Administer medications as ordered  - Instruct and encourage patient and family to use good hand hygiene technique  - Identify and instruct in appropriate isolation precautions for identified infection/condition  Outcome: Progressing  Goal: Absence of fever/infection during neutropenic period  Description: INTERVENTIONS:  - Monitor WBC    Outcome: Progressing     Problem: SAFETY ADULT  Goal: Patient will remain free of falls  Description: INTERVENTIONS:  - Assess patient frequently for physical needs  -  Identify cognitive and physical deficits and behaviors that affect risk of falls    -  [place] fall precautions as indicated by assessment   - Educate patient/family on patient safety including physical limitations  - Instruct patient to call for assistance with activity based on assessment  - Modify environment to reduce risk of injury  - Consider OT/PT consult to assist with strengthening/mobility  Outcome: Progressing  Goal: Maintain or return to baseline ADL function  Description: INTERVENTIONS:  -  Assess patient's ability to carry out ADLs; assess patient's baseline for ADL function and identify physical deficits which impact ability to perform ADLs (bathing, care of mouth/teeth, toileting, grooming, dressing, etc )  - Assess/evaluate cause of self-care deficits   - Assess range of motion  - Assess patient's mobility; develop plan if impaired  - Assess patient's need for assistive devices and provide as appropriate  - Encourage maximum independence but intervene and supervise when necessary  - Involve family in performance of ADLs  - Assess for home care needs following discharge   - Consider OT consult to assist with ADL evaluation and planning for discharge  - Provide patient education as appropriate  Outcome: Progressing  Goal: Maintain or return mobility status to optimal level  Description: INTERVENTIONS:  - Assess patient's baseline mobility status (ambulation, transfers, stairs, etc )    - Identify cognitive and physical deficits and behaviors that affect mobility  - Identify mobility aids required to assist with transfers and/or ambulation (gait belt, sit-to-stand, lift, walker, cane, etc )  - [place] fall precautions as indicated by assessment  - Record patient progress and toleration of activity level on Mobility SBAR; progress patient to next Phase/Stage  - Instruct patient to call for assistance with activity based on assessment  - Consider rehabilitation consult to assist with strengthening/weightbearing, etc   Outcome: Progressing     Problem: DISCHARGE PLANNING  Goal: Discharge to home or other facility with appropriate resources  Description: INTERVENTIONS:  - Identify barriers to discharge w/patient and caregiver  - Arrange for needed discharge resources and transportation as appropriate  - Identify discharge learning needs (meds, wound care, etc )  - Arrange for interpretive services to assist at discharge as needed  - Refer to Case Management Department for coordinating discharge planning if the patient needs post-hospital services based on physician/advanced practitioner order or complex needs related to functional status, cognitive ability, or social support system  Outcome: Progressing     Problem: Knowledge Deficit  Goal: Patient/family/caregiver demonstrates understanding of disease process, treatment plan, medications, and discharge instructions  Description: Complete learning assessment and assess knowledge base    Interventions:  - Provide teaching at level of understanding  - Provide teaching via preferred learning methods  Outcome: Progressing     Problem: RESPIRATORY - ADULT  Goal: Achieves optimal ventilation and oxygenation  Description: INTERVENTIONS:  - Assess for changes in respiratory status  - Assess for changes in mentation and behavior  - Position to facilitate oxygenation and minimize respiratory effort  - Oxygen administered by appropriate delivery if ordered  - Initiate smoking cessation education as indicated  - Encourage broncho-pulmonary hygiene including cough, deep breathe, Incentive Spirometry  - Assess the need for suctioning and aspirate as needed  - Assess and instruct to report SOB or any respiratory difficulty  - Respiratory Therapy support as indicated  Outcome: Progressing     Problem: GASTROINTESTINAL - ADULT  Goal: Minimal or absence of nausea and/or vomiting  Description: INTERVENTIONS:  - Administer IV fluids if ordered to ensure adequate hydration  - Maintain NPO status until nausea and vomiting are resolved  - Nasogastric tube if ordered  - Administer ordered antiemetic medications as needed  - Provide nonpharmacologic comfort measures as appropriate  - Advance diet as tolerated, if ordered  - Consider nutrition services referral to assist patient with adequate nutrition and appropriate food choices  Outcome: Progressing  Goal: Maintains or returns to baseline bowel function  Description: INTERVENTIONS:  - Assess bowel function  - Encourage oral fluids to ensure adequate hydration  - Administer IV fluids if ordered to ensure adequate hydration  - Administer ordered medications as needed  - Encourage mobilization and activity  - Consider nutritional services referral to assist patient with adequate nutrition and appropriate food choices  Outcome: Progressing  Goal: Maintains adequate nutritional intake  Description: INTERVENTIONS:  - Monitor percentage of each meal consumed  - Identify factors contributing to decreased intake, treat as appropriate  - Assist with meals as needed  - Monitor I&O, weight, and lab values if indicated  - Obtain nutrition services referral as needed  Outcome: Progressing     Problem: METABOLIC, FLUID AND ELECTROLYTES - ADULT  Goal: Electrolytes maintained within normal limits  Description: INTERVENTIONS:  - Monitor labs and assess patient for signs and symptoms of electrolyte imbalances  - Administer electrolyte replacement as ordered  - Monitor response to electrolyte replacements, including repeat lab results as appropriate  - Instruct patient on fluid and nutrition as appropriate  Outcome: Progressing  Goal: Fluid balance maintained  Description: INTERVENTIONS:  - Monitor labs   - Monitor I/O and WT  - Instruct patient on fluid and nutrition as appropriate  - Assess for signs & symptoms of volume excess or deficit  Outcome: Progressing  Goal: Glucose maintained within target range  Description: INTERVENTIONS:  - Monitor Blood Glucose as ordered  - Assess for signs and symptoms of hyperglycemia and hypoglycemia  - Administer ordered medications to maintain glucose within target range  - Assess nutritional intake and initiate nutrition service referral as needed  Outcome: Progressing

## 2021-03-06 NOTE — H&P
INTERNAL MEDICINE RESIDENCY ADMISSION H&P     Name: Hanh Guardado   Age & Sex: 53 y o  female   MRN: [id number]  Unit/Bed#: ED 26   Encounter: [id number]  Primary Care Provider: Judah Addison DO    Code Status: Level 3 - DNAR and DNI  Admission Status: OBSERVATION  Disposition: Patient requires Med/Surg    Admit to team: SOD Team A    ASSESSMENT/PLAN     Principal Problem:    Alcoholic cirrhosis ([place] [address] )  Active Problems:    Shortness of breath    Depression    Thrombocytopenia ([facility] )      * Alcoholic cirrhosis ([place] [address] )  Assessment & Plan  · Pt is being evaluated for a history of cirrhosis   Her outpatient primary gastroenterologist is Dr Mercy Multani affiliated with [facility]   · Etiology: Alcohol, abstinent since 1 year   · Stage: 3 (+/-) non-bleeding varices, (+ascites)   · Compensated:No; DECOMPENSATED due to history of variceal bleeding, Ascites, Hepatic Encephalopathy and Jaundice  · [place] Score: Class C   · MELD: 18 (has not started transplant evaluation yet)  · [place] [address]  Screening up to date: Ultrasound RUQ with liver dopplers ordered   · Variceal screening up to date: yes- h/o variceal bleeding- small varices noted on EGD and severe portal hypertensive gastropathy- done on 11/02  · Varices intervention: Patient is not on Beta blockers because of history of bradycardia and hypotension  · Ascites intervention: diuretics- spironolactone, furosemide, paracentesis- reports that she had a paracentesis almost 1 5 years ago with 6L takes out; will set up IR paracentesis with labs to rule out SBP given that she is having abdominal distention   · Hepatic Encephalopathy intervention: lactulose;rifaximin- titrate lactulose to produce  3 bowel movements everyday     Shortness of breath  Assessment & Plan  · Unclear etiology, one day onset  · DDx includes acute decompensated heart failure vs hepatopulmonary syndrome  · Will obtain ECHO and BNP- however can be falsely low given obesity   · No signs of PE so remains low on the ddx  · CXR shows some pulmonary vascular congestion, will administer 1 dose of iv lasix and continue her at her home dose of diuretics starting tomorrow         Macrocytic anemia and Thrombocytopenia   Assessment & Plan  ·  Thrombocytopenia likely secondary to cirrhosis  ·  Stable and without signs of bleeding currently   ·  On B12 and folate supplementation  ·  Hb to be at baseline however might be contributing to shortness of breath  ·  Will check TSH, B12 and folate  ·  If normal consider workup for MDS     Depression  Assessment & Plan  · Patient is on lexapro, remeron in chart but reports she does not take so will not order       VTE Pharmacologic Prophylaxis: Heparin  VTE Mechanical Prophylaxis: sequential compression device    CHIEF COMPLAINT     Chief Complaint   Patient presents with    Shortness of Breath     patient states she started having shortness of breath last night as well as some confusion  Patient states she also has abdominal pain and swollen legs/ankles  HISTORY OF PRESENT ILLNESS     Patient is a 53year old female- a resident of [place]- with a past medical history of decompensated alcoholic cirrhosis of the liver with jaundice, ascites, encephalopathy and h/o bleeding varices, diastolic heart failure, depression who presents from her nursing home with shortness of breath, increased abdominal distention and pain, lack of bowel movements, swollen legs and confusion with altered sleep cycles  She reports that the shortness of breath started 1-2 days ago, does not have any positional variation in her shortness of breath, does not have a cough or hemoptysis  Her abdominal distention has gradually been getting worse  She does have a history of getting a paracentesis but reports that it was 1 5 years ago  She sees Dr Krystin Rodrigues who is her gastroenterologist and says that she was told she is now a candidate for transplant evaluation since she has been abstinent from alcohol for almost a year  She also reports having confusion and sleep disturbance, she reports not getting her lactulose regularly at [facility] and has not had a bowel movement in 2 days  She reports her legs being swollen and reports feeling unwell overall  She wanted to be admitted at [facility] but she was brought to the ED at [facility]  REVIEW OF SYSTEMS     Review of Systems   Constitutional: Positive for fatigue  Negative for fever  Respiratory: Positive for shortness of breath  Negative for cough and wheezing  Cardiovascular: Negative for chest pain, palpitations and leg swelling  Gastrointestinal: Positive for abdominal distention, abdominal pain and constipation  Negative for blood in stool  Endocrine: Negative  Genitourinary: Negative  Musculoskeletal: Negative  Skin: Negative  Allergic/Immunologic: Negative  Neurological: Negative  Hematological: Negative  Psychiatric/Behavioral: Negative  OBJECTIVE     Vitals:    21 18321 2227   BP: 140/67 128/67   BP Location: Right arm Right arm   Pulse: 76 77   Resp: 18 16   Temp: 98 1 °F (36 7 °C)    TempSrc: Oral    SpO2: 98% 97%   Weight: 79 8 kg (176 lb)    Height: 5' 4" (1 626 m)       Temperature:   Temp (24hrs), Av 1 °F (36 7 °C), Min:98 1 °F (36 7 °C), Max:98 1 °F (36 7 °C)    Temperature: 98 1 °F (36 7 °C)  Intake & Output:  I/O     None        Weights:   IBW: 54 7 kg    Body mass index is 30 21 kg/m²  Weight (last 2 days)     Date/Time   Weight    21 183   79 8 (176)            Physical Exam  Constitutional:       Appearance: She is obese  HENT:      Head: Normocephalic and atraumatic  Mouth/Throat:      Mouth: Mucous membranes are moist    Eyes:      General: Scleral icterus present  Cardiovascular:      Rate and Rhythm: Normal rate and regular rhythm  Heart sounds: Murmur present  Systolic murmur present with a grade of 4/6     Pulmonary:      Effort: Pulmonary effort is normal       Breath sounds: Normal breath sounds  No wheezing  Abdominal:      General: Abdomen is protuberant  Palpations: There is shifting dullness  Hernia: A hernia is present  Hernia is present in the umbilical area  Skin:     General: Skin is warm  Neurological:      General: No focal deficit present  Mental Status: She is alert and oriented to person, place, and time  Psychiatric:         Mood and Affect: Mood normal        PAST MEDICAL HISTORY     Past Medical History:   Diagnosis Date    Alcohol abuse     Cirrhosis ([address] )     Hypertension      PAST SURGICAL HISTORY   History reviewed  No pertinent surgical history  SOCIAL & FAMILY HISTORY     Social History     Substance and Sexual Activity   Alcohol Use Not Currently    Comment: currently 28 days sober     Substance and Sexual Activity   Alcohol Use Not Currently    Comment: currently 28 days sober        Substance and Sexual Activity   Drug Use Never     Social History     Tobacco Use   Smoking Status Former Smoker    Types: Cigarettes    Quit date:     Years since quittin 1   Smokeless Tobacco Never Used     History reviewed  No pertinent family history  LABORATORY DATA     Labs: I have personally reviewed pertinent reports      Results from last 7 days   Lab Units 21   WBC Thousand/uL 5 14   HEMOGLOBIN g/dL 7 6*   HEMATOCRIT % 24 6*   PLATELETS Thousands/uL 58*      Results from last 7 days   Lab Units 21   POTASSIUM mmol/L 3 6   CHLORIDE mmol/L 107   CO2 mmol/L 25   BUN mg/dL 12   CREATININE mg/dL 0 73   CALCIUM mg/dL 7 9*   ALK PHOS U/L 140*   ALT U/L 20   AST U/L 32              Results from last 7 days   Lab Units 21   INR  1 68*   PTT seconds 36         Results from last 7 days   Lab Units 21   TROPONIN I ng/mL <0 02     Micro:  Lab Results   Component Value Date    URINECX >100,000 cfu/ml Escherichia coli (A) 10/29/2019     IMAGING & DIAGNOSTIC TESTS     Imaging: I have personally reviewed pertinent reports  No results found  EKG, Pathology, and Other Studies: I have personally reviewed pertinent reports  ALLERGIES   No Known Allergies  MEDICATIONS PRIOR TO ARRIVAL     Prior to Admission medications    Medication Sig Start Date End Date Taking?  Authorizing Provider   acetaminophen (TYLENOL) 325 mg tablet Take 650 mg by mouth as needed for mild pain   Yes Historical Provider, MD   bisacodyl (DULCOLAX) 10 mg suppository Insert 10 mg into the rectum as needed for constipation   Yes Historical Provider, MD   cyanocobalamin (VITAMIN B-12) 1000 MCG tablet Take 1,000 mcg by mouth daily   Yes Historical Provider, MD   escitalopram (LEXAPRO) 10 mg tablet Take 10 mg by mouth daily   Yes Historical Provider, MD   iron polysaccharides (FERREX) 150 mg capsule Take 150 mg by mouth 2 (two) times a day   Yes Historical Provider, MD   Melatonin 5 MG TABS Take 5 mg by mouth   Yes Historical Provider, MD   ondansetron (ZOFRAN) 4 mg tablet Take 4 mg by mouth every 8 (eight) hours as needed for nausea or vomiting   Yes Historical Provider, MD   pantoprazole (PROTONIX) 40 mg tablet Take 40 mg by mouth 2 (two) times a day   Yes Historical Provider, MD   polyethylene glycol (MIRALAX) 17 g packet Take 17 g by mouth daily   Yes Historical Provider, MD   potassium chloride (MICRO-K) 10 MEQ CR capsule Take 40 mEq by mouth 3 (three) times a day   Yes Historical Provider, MD   rifaximin (XIFAXAN) 550 mg tablet Take 550 mg by mouth 2 (two) times a day   Yes Historical Provider, MD   SALINE NASAL SPRAY NA into each nostril   Yes Historical Provider, MD   triamcinolone (KENALOG) 0 5 % cream Apply topically 3 (three) times a day   Yes Historical Provider, MD   vitamin E, tocopherol, 200 units capsule Take 200 Units by mouth daily   Yes Historical Provider, MD   zinc sulfate (ZINCATE) 220 mg capsule Take 110 mg by mouth daily   Yes Historical Provider, MD   ferrous sulfate 325 (65 Fe) mg tablet Take 325 mg by mouth daily with breakfast    Historical Provider, MD   folic acid (FOLVITE) 1 mg tablet Take 400 mcg by mouth daily    Historical Provider, MD   furosemide (LASIX) 20 mg tablet Take 60 mg by mouth 2 (two) times a day  8/27/19 3/5/21  Historical Provider, MD   lactulose (CEPHULAC) 20 g packet Take 1 packet (20 g total) by mouth daily  Patient taking differently: Take 20 g by mouth 2 (two) times a day  10/30/19   Nikita Carrington MD   methocarbamol (ROBAXIN) 500 mg tablet Take 750 mg by mouth daily at bedtime as needed for muscle spasms     Historical Provider, MD   mirtazapine (REMERON) 30 mg tablet Take 30 mg by mouth daily at bedtime 9/25/19   Historical Provider, MD   spironolactone (ALDACTONE) 25 mg tablet Take 150 mg by mouth daily Total of 150mg 8/27/19 8/26/20  Historical Provider, MD   thiamine 100 MG tablet Take 100 mg by mouth daily    Historical Provider, MD   traZODone (DESYREL) 50 mg tablet Take 50 mg by mouth daily at bedtime    Historical Provider, MD   trimethobenzamide (Tigan) 300 mg capsule Take 300 mg by mouth as needed for nausea    Historical Provider, MD     MEDICATIONS ADMINISTERED IN LAST 24 HOURS     Medication Administration - last 24 hours from 03/04/2021 2357 to 03/05/2021 2357       Date/Time Order Dose Route Action Action by     03/05/2021 2257 escitalopram (LEXAPRO) tablet 10 mg 10 mg Oral Given Cameron Drummond RN     03/05/2021 2257 melatonin tablet 6 mg 6 mg Oral Given Cameron Drummond RN     03/05/2021 2256 rifaximin Cleo Jobs) tablet 550 mg 550 mg Oral Given Cameron Drummond RN     03/05/2021 2257 traZODone (DESYREL) tablet 50 mg 50 mg Oral Given Cameron Drummond RN     03/05/2021 2256 acetaminophen (TYLENOL) tablet 975 mg 975 mg Oral Given Cameron Drummond RN        CURRENT MEDICATIONS     Current Facility-Administered Medications   Medication Dose Route Frequency Provider Last Rate    [START ON 3/6/2021] cyanocobalamin  1,000 mcg Oral Daily Lilliam Bradshaw MD      [START ON 3/6/2021] escitalopram  10 mg Oral Daily Jelly Ramirez MD      [START ON 3/6/2021] ferrous sulfate  325 mg Oral Daily With Breakfast MD Modesto Brurell ON 1/8/2446] folic acid  092 mcg Oral Daily Jelly Ramirez MD      furosemide  40 mg Intravenous Once MD Modesto Burrell ON 3/6/2021] furosemide  60 mg Oral Daily Jelly Ramirez MD      heparin (porcine)  5,000 Units Subcutaneous [facility] Jelly Ramirez MD      hydrOXYzine HCL  50 mg Oral Once Jelly Ramirez MD      lactulose  20 g Oral Once Jelly Ramirez MD      [START ON 3/6/2021] lactulose  20 g Oral TID Jelly Ramirez MD      melatonin  6 mg Oral HS Jelly Ramirez MD      [START ON 3/6/2021] pantoprazole  40 mg Oral Early Morning MD Modesto Burrell ON 3/6/2021] polyethylene glycol  17 g Oral Daily Jelly Ramirez MD      potassium chloride  20 mEq Oral Once Jelly Ramirez MD      rifaximin  550 mg Oral BID Jelly Ramirez MD      sodium chloride (PF)  3 mL Intravenous Q1H PRN Nirmala Schuler MD      [START ON 3/6/2021] spironolactone  50 mg Oral Daily Jelly Ramirez MD      [START ON 3/6/2021] thiamine  100 mg Oral Daily MD Modesto Burrell ON 3/6/2021] traZODone  50 mg Oral HS Jelly Ramirez MD          sodium chloride (PF), 3 mL, Q1H PRN        Admission Time  I spent 30 minutes admitting the patient  This involved direct patient contact where I performed a full history and physical, reviewing previous records, and reviewing laboratory and other diagnostic studies  Portions of the record may have been created with voice recognition software  Occasional wrong word or "sound a like" substitutions may have occurred due to the inherent limitations of voice recognition software    Read the chart carefully and recognize, using context, where substitutions have occurred     ==  Jelly Ramirez MD  [address]  Internal Medicine Residency PGY-3

## 2021-03-07 LAB
ABO GROUP BLD BPU: NORMAL
ALBUMIN SERPL BCP-MCNC: 2.2 G/DL (ref 3.5–5)
ALP SERPL-CCNC: 126 U/L (ref 46–116)
ALT SERPL W P-5'-P-CCNC: 18 U/L (ref 12–78)
ANION GAP SERPL CALCULATED.3IONS-SCNC: 5 MMOL/L (ref 4–13)
AST SERPL W P-5'-P-CCNC: 26 U/L (ref 5–45)
BILIRUB SERPL-MCNC: 3.62 MG/DL (ref 0.2–1)
BPU ID: NORMAL
BUN SERPL-MCNC: 14 MG/DL (ref 5–25)
CALCIUM ALBUM COR SERPL-MCNC: 9.6 MG/DL (ref 8.3–10.1)
CALCIUM SERPL-MCNC: 8.2 MG/DL (ref 8.3–10.1)
CHLORIDE SERPL-SCNC: 108 MMOL/L (ref 100–108)
CO2 SERPL-SCNC: 26 MMOL/L (ref 21–32)
CREAT SERPL-MCNC: 0.86 MG/DL (ref 0.6–1.3)
CROSSMATCH: NORMAL
ERYTHROCYTE [DISTWIDTH] IN BLOOD BY AUTOMATED COUNT: 18.8 % (ref 11.6–15.1)
GFR SERPL CREATININE-BSD FRML MDRD: 80 ML/MIN/1.73SQ M
GLUCOSE P FAST SERPL-MCNC: 113 MG/DL (ref 65–99)
GLUCOSE SERPL-MCNC: 113 MG/DL (ref 65–140)
HCT VFR BLD AUTO: 24.3 % (ref 34.8–46.1)
HGB BLD-MCNC: 7.7 G/DL (ref 11.5–15.4)
MCH RBC QN AUTO: 34.2 PG (ref 26.8–34.3)
MCHC RBC AUTO-ENTMCNC: 31.7 G/DL (ref 31.4–37.4)
MCV RBC AUTO: 108 FL (ref 82–98)
NRBC BLD AUTO-RTO: 0 /100 WBCS
PLATELET # BLD AUTO: 51 THOUSANDS/UL (ref 149–390)
PMV BLD AUTO: 10.4 FL (ref 8.9–12.7)
POTASSIUM SERPL-SCNC: 4.1 MMOL/L (ref 3.5–5.3)
PROT SERPL-MCNC: 5.3 G/DL (ref 6.4–8.2)
RBC # BLD AUTO: 2.25 MILLION/UL (ref 3.81–5.12)
SODIUM SERPL-SCNC: 139 MMOL/L (ref 136–145)
UNIT DISPENSE STATUS: NORMAL
UNIT PRODUCT CODE: NORMAL
UNIT RH: NORMAL
WBC # BLD AUTO: 3.5 THOUSAND/UL (ref 4.31–10.16)

## 2021-03-07 PROCEDURE — 85007 BL SMEAR W/DIFF WBC COUNT: CPT | Performed by: INTERNAL MEDICINE

## 2021-03-07 PROCEDURE — 85027 COMPLETE CBC AUTOMATED: CPT | Performed by: STUDENT IN AN ORGANIZED HEALTH CARE EDUCATION/TRAINING PROGRAM

## 2021-03-07 PROCEDURE — 80053 COMPREHEN METABOLIC PANEL: CPT | Performed by: STUDENT IN AN ORGANIZED HEALTH CARE EDUCATION/TRAINING PROGRAM

## 2021-03-07 PROCEDURE — 99233 SBSQ HOSP IP/OBS HIGH 50: CPT | Performed by: INTERNAL MEDICINE

## 2021-03-07 RX ORDER — LACTULOSE 20 G/30ML
30 SOLUTION ORAL 3 TIMES DAILY
Status: DISCONTINUED | OUTPATIENT
Start: 2021-03-07 | End: 2021-03-09 | Stop reason: HOSPADM

## 2021-03-07 RX ADMIN — RIFAXIMIN 550 MG: 550 TABLET ORAL at 21:04

## 2021-03-07 RX ADMIN — TRAZODONE HYDROCHLORIDE 50 MG: 50 TABLET ORAL at 21:05

## 2021-03-07 RX ADMIN — LACTULOSE 30 G: 10 SOLUTION ORAL at 15:09

## 2021-03-07 RX ADMIN — LACTULOSE 20 G: 10 SOLUTION ORAL at 08:09

## 2021-03-07 RX ADMIN — PANTOPRAZOLE SODIUM 40 MG: 40 TABLET, DELAYED RELEASE ORAL at 05:22

## 2021-03-07 RX ADMIN — POLYETHYLENE GLYCOL 3350 17 G: 17 POWDER, FOR SOLUTION ORAL at 08:09

## 2021-03-07 RX ADMIN — RIFAXIMIN 550 MG: 550 TABLET ORAL at 08:08

## 2021-03-07 RX ADMIN — MELATONIN TAB 3 MG 6 MG: 3 TAB at 21:04

## 2021-03-07 RX ADMIN — ESCITALOPRAM OXALATE 10 MG: 10 TABLET ORAL at 21:05

## 2021-03-07 RX ADMIN — THIAMINE HCL TAB 100 MG 100 MG: 100 TAB at 08:08

## 2021-03-07 RX ADMIN — FOLIC ACID TAB 400 MCG 400 MCG: 400 TAB at 08:09

## 2021-03-07 RX ADMIN — FUROSEMIDE 60 MG: 40 TABLET ORAL at 08:08

## 2021-03-07 RX ADMIN — CYANOCOBALAMIN TAB 500 MCG 1000 MCG: 500 TAB at 08:08

## 2021-03-07 RX ADMIN — LORAZEPAM 0.5 MG: 0.5 TABLET ORAL at 21:05

## 2021-03-07 RX ADMIN — LORAZEPAM 0.5 MG: 0.5 TABLET ORAL at 12:45

## 2021-03-07 RX ADMIN — SPIRONOLACTONE 100 MG: 50 TABLET, FILM COATED ORAL at 08:08

## 2021-03-07 NOTE — PROGRESS NOTES
INTERNAL MEDICINE RESIDENCY PROGRESS NOTE     PATIENT INFORMATION     Name: Hesham Li   Age & Sex: 53 y o  female   MRN: [id number]  Hospital Stay Days: 0  Unit/Bed#: [facility] 705-01   Encounter: [id number]  Team: SOD Team A    ASSESSMENT/PLAN     Principal Problem:    Alcoholic cirrhosis ([facility] )  Active Problems:    Depression    Shortness of breath    Thrombocytopenia (HCC)    Macrocytic anemia    Macrocytic anemia  Assessment & Plan  · On B12 and folate supplementation  · Appears to be at baseline however might be contributing to shortness of breath  · B12 and folate levels elevated  · TSH normal  · Received 1 unit pRBCs on 03/06  · Can consider workup for MDS     Thrombocytopenia (HCC)  Assessment & Plan  · Secondary to cirrhosis  · Stable and without signs of bleeding currently     Shortness of breath  Assessment & Plan  · Unclear etiology, one day onset  · DDx includes acute decompensated heart failure vs hepatopulmonary syndrome  · ECHO showed an EF of 60%  · BNP- 246  · No signs of PE so remains low on the ddx  · CXR shows some pulmonary vascular congestion  · Continue IV Lasix    Depression  Assessment & Plan  · Continue home lexapro  · Continue trazodone  · P r n  Ativan  · Remeron in chart but reports she does not take so will not order     * Alcoholic cirrhosis ([place] [address] )  Assessment & Plan  · Pt is being evaluated for a history of cirrhosis   Her outpatient primary gastroenterologist is Dr Yancy Pastor [facility]   · Etiology: Alcohol, abstinent since 1 year   · Stage: 3 (+/-) non-bleeding varices, (+ascites)   · Compensated:No; DECOMPENSATED due to history of variceal bleeding, Ascites, Hepatic Encephalopathy and Jaundice  · [place] Score: Class C   · MELD: 18 (has not started transplant evaluation yet)  · [place] [address]  Screening up to date  · Ultrasound RUQ with liver dopplers ordered -> Found cirrhosis with nodular contour, ascites, evidence of portal hypertension with dilation of main portal vein, splenomegaly, gallstones without sonographic Weiss sign, and wall thickening and pericholecystic fluid possibly related to hepatic disease  · Variceal screening up to date: yes- h/o variceal bleeding- small varices noted on EGD and severe portal hypertensive gastropathy- done on   · Varices intervention: Patient is not on Beta blockers because of history of bradycardia and hypotension  · Ascites intervention: diuretics-> spironolactone, furosemide, paracentesis-> reports that she had a paracentesis almost 1 5 years ago with 6L takes out; will set up IR paracentesis with labs to rule out SBP given that she is having abdominal distention   · Hepatic Encephalopathy intervention: lactulose and rifaximin -> titrate lactulose to produce  3 bowel movements everyday       Disposition: Inpatient     SUBJECTIVE     Patient seen and examined  No acute events overnight  Overall the patient feels well  She still complains of generalized body pain, leg swelling, and mild chest pain and shortness of breath at rest     OBJECTIVE     Vitals:    21 1546 21 2157 21 0600 21 0741   BP: 114/65 114/64  114/64   BP Location:       Pulse:    79   Resp:  16  18   Temp: 98 1 °F (36 7 °C) 98 5 °F (36 9 °C)  98 6 °F (37 °C)   TempSrc:       SpO2:    93%   Weight:   75 3 kg (166 lb 0 1 oz)    Height:          Temperature:   Temp (24hrs), Av 2 °F (36 8 °C), Min:97 5 °F (36 4 °C), Max:98 6 °F (37 °C)    Temperature: 98 6 °F (37 °C)  Intake & Output:  I/O       / 07 - / 0700 / 07 -  07/ 07 -  0700    P  O   840 480    Blood  422 9     Total Intake(mL/kg)  1262 9 (16 8) 480 (6 4)    Urine (mL/kg/hr)  1650 (0 9)     Total Output  1650     Net  -387 1 +480           Unmeasured Urine Occurrence  1 x           Intake/Output Summary (Last 24 hours) at 3/7/2021 1410  Last data filed at 3/7/2021 0812  Gross per 24 hour   Intake 1742 92 ml   Output 1550 ml   Net 192 92 ml     I/O this shift: In: 480 [P O :480]  Out: -   Weights:   IBW: 54 7 kg    Body mass index is 28 49 kg/m²  Weight (last 2 days)     Date/Time   Weight    03/07/21 0600   75 3 (166 01)    03/06/21 0600   79 2 (174 6)    03/05/21 1831   79 8 (176)            Physical Exam  Constitutional:       Appearance: She is well-developed  HENT:      Head: Normocephalic and atraumatic  Nose: Nose normal    Eyes:      Conjunctiva/sclera: Conjunctivae normal       Pupils: Pupils are equal, round, and reactive to light  Neck:      Vascular: No JVD  Cardiovascular:      Rate and Rhythm: Normal rate and regular rhythm  Heart sounds: Murmur present  No friction rub  No gallop  Pulmonary:      Effort: Pulmonary effort is normal  No respiratory distress  Breath sounds: Normal breath sounds  No stridor  No wheezing or rales  Chest:      Chest wall: Tenderness present  Abdominal:      General: Bowel sounds are normal  There is distension  Palpations: Abdomen is soft  There is no mass  Tenderness: There is abdominal tenderness  There is no guarding or rebound  Hernia: A hernia (Umbilical) is present  Musculoskeletal:         General: No tenderness or deformity  Right lower leg: Edema present  Left lower leg: Edema present  Skin:     General: Skin is warm and dry  Neurological:      Mental Status: She is alert and oriented to person, place, and time  Psychiatric:         Mood and Affect: Mood normal          Behavior: Behavior normal          Thought Content: Thought content normal          Judgment: Judgment normal         LABORATORY DATA     Labs: I have personally reviewed pertinent reports      Results from last 7 days   Lab Units 03/07/21  0615 03/06/21  1749 03/06/21  1003 03/06/21  0454 03/06/21  0005 03/05/21  1914   WBC Thousand/uL 3 50*  --   --  3 51*  --  5 14   HEMOGLOBIN g/dL 7 7* 8 9* 7 0* 6 5*  --  7 6*   HEMATOCRIT % 24 3* 28 2* 22 4* 20 8*  --  24 6*   PLATELETS Thousands/uL 51*  --   --  50* 55* 58*   NEUTROS PCT %  --   --   --  63  --   --    MONOS PCT %  --   --   --  12  --   --       Results from last 7 days   Lab Units 03/07/21  0615 03/06/21  0454 03/05/21 1914   POTASSIUM mmol/L 4 1 3 4* 3 6   CHLORIDE mmol/L 108 107 107   CO2 mmol/L 26 25 25   BUN mg/dL 14 14 12   CREATININE mg/dL 0 86 0 75 0 73   CALCIUM mg/dL 8 2* 7 7* 7 9*   ALK PHOS U/L 126* 101 140*   ALT U/L 18 17 20   AST U/L 26 25 32     Serum creatinine: 0 86 mg/dL 03/07/21 0615  Estimated creatinine clearance: 78 6 mL/min            Results from last 7 days   Lab Units 03/05/21 1914   INR  1 68*   PTT seconds 36         Results from last 7 days   Lab Units 03/05/21 1914   TROPONIN I ng/mL <0 02       IMAGING & DIAGNOSTIC TESTING     Radiology Results: I have personally reviewed pertinent reports  X-ray Chest 1 View Portable    Result Date: 3/6/2021  Impression: Mild pulmonary vascular congestion  Workstation performed: [id number]     Other Diagnostic Testing: I have personally reviewed pertinent reports        ACTIVE MEDICATIONS     Current Facility-Administered Medications   Medication Dose Route Frequency    cyanocobalamin (VITAMIN B-12) tablet 1,000 mcg  1,000 mcg Oral Daily    escitalopram (LEXAPRO) tablet 10 mg  10 mg Oral Daily    ferrous sulfate tablet 325 mg  325 mg Oral Daily With Breakfast    folic acid (FOLVITE) tablet 400 mcg  400 mcg Oral Daily    furosemide (LASIX) injection 40 mg  40 mg Intravenous Once    furosemide (LASIX) tablet 60 mg  60 mg Oral Daily    heparin (porcine) subcutaneous injection 5,000 Units  5,000 Units Subcutaneous Q8H [facility]    lactulose oral solution 20 g  20 g Oral Once    lactulose oral solution 30 g  30 g Oral TID    LORazepam (ATIVAN) tablet 0 5 mg  0 5 mg Oral Q8H PRN    melatonin tablet 6 mg  6 mg Oral HS    pantoprazole (PROTONIX) EC tablet 40 mg  40 mg Oral Early Morning    polyethylene glycol (MIRALAX) packet 17 g  17 g Oral Daily    rifaximin (XIFAXAN) tablet 550 mg  550 mg Oral BID    sodium chloride (PF) 0 9 % injection 3 mL  3 mL Intravenous Q1H PRN    spironolactone (ALDACTONE) tablet 100 mg  100 mg Oral Daily    thiamine tablet 100 mg  100 mg Oral Daily    traZODone (DESYREL) tablet 50 mg  50 mg Oral HS     VTE Pharmacologic Prophylaxis: Heparin  VTE Mechanical Prophylaxis: sequential compression device    ==  Jennifer Finnegan MD  IM Resident, PGY-1  [facility] [facility] Internal Medicine Residency

## 2021-03-07 NOTE — PLAN OF CARE
Problem: Potential for Falls  Goal: Patient will remain free of falls  Description: INTERVENTIONS:  - Assess patient frequently for physical needs  -  Identify cognitive and physical deficits and behaviors that affect risk of falls    -  [place] fall precautions as indicated by assessment   - Educate patient/family on patient safety including physical limitations  - Instruct patient to call for assistance with activity based on assessment  - Modify environment to reduce risk of injury  - Consider OT/PT consult to assist with strengthening/mobility  Outcome: Progressing     Problem: PAIN - ADULT  Goal: Verbalizes/displays adequate comfort level or baseline comfort level  Description: Interventions:  - Encourage patient to monitor pain and request assistance  - Assess pain using appropriate pain scale  - Administer analgesics based on type and severity of pain and evaluate response  - Implement non-pharmacological measures as appropriate and evaluate response  - Consider cultural and social influences on pain and pain management  - Notify physician/advanced practitioner if interventions unsuccessful or patient reports new pain  Outcome: Progressing     Problem: INFECTION - ADULT  Goal: Absence or prevention of progression during hospitalization  Description: INTERVENTIONS:  - Assess and monitor for signs and symptoms of infection  - Monitor lab/diagnostic results  - Monitor all insertion sites, i e  indwelling lines, tubes, and drains  - Monitor endotracheal if appropriate and nasal secretions for changes in amount and color  - [place] appropriate cooling/warming therapies per order  - Administer medications as ordered  - Instruct and encourage patient and family to use good hand hygiene technique  - Identify and instruct in appropriate isolation precautions for identified infection/condition  Outcome: Progressing  Goal: Absence of fever/infection during neutropenic period  Description: INTERVENTIONS:  - Monitor WBC    Outcome: Progressing     Problem: SAFETY ADULT  Goal: Patient will remain free of falls  Description: INTERVENTIONS:  - Assess patient frequently for physical needs  -  Identify cognitive and physical deficits and behaviors that affect risk of falls    -  [place] fall precautions as indicated by assessment   - Educate patient/family on patient safety including physical limitations  - Instruct patient to call for assistance with activity based on assessment  - Modify environment to reduce risk of injury  - Consider OT/PT consult to assist with strengthening/mobility  Outcome: Progressing  Goal: Maintain or return to baseline ADL function  Description: INTERVENTIONS:  -  Assess patient's ability to carry out ADLs; assess patient's baseline for ADL function and identify physical deficits which impact ability to perform ADLs (bathing, care of mouth/teeth, toileting, grooming, dressing, etc )  - Assess/evaluate cause of self-care deficits   - Assess range of motion  - Assess patient's mobility; develop plan if impaired  - Assess patient's need for assistive devices and provide as appropriate  - Encourage maximum independence but intervene and supervise when necessary  - Involve family in performance of ADLs  - Assess for home care needs following discharge   - Consider OT consult to assist with ADL evaluation and planning for discharge  - Provide patient education as appropriate  Outcome: Progressing  Goal: Maintain or return mobility status to optimal level  Description: INTERVENTIONS:  - Assess patient's baseline mobility status (ambulation, transfers, stairs, etc )    - Identify cognitive and physical deficits and behaviors that affect mobility  - Identify mobility aids required to assist with transfers and/or ambulation (gait belt, sit-to-stand, lift, walker, cane, etc )  - [place] fall precautions as indicated by assessment  - Record patient progress and toleration of activity level on Mobility SBAR; progress patient to next Phase/Stage  - Instruct patient to call for assistance with activity based on assessment  - Consider rehabilitation consult to assist with strengthening/weightbearing, etc   Outcome: Progressing     Problem: DISCHARGE PLANNING  Goal: Discharge to home or other facility with appropriate resources  Description: INTERVENTIONS:  - Identify barriers to discharge w/patient and caregiver  - Arrange for needed discharge resources and transportation as appropriate  - Identify discharge learning needs (meds, wound care, etc )  - Arrange for interpretive services to assist at discharge as needed  - Refer to Case Management Department for coordinating discharge planning if the patient needs post-hospital services based on physician/advanced practitioner order or complex needs related to functional status, cognitive ability, or social support system  Outcome: Progressing     Problem: Knowledge Deficit  Goal: Patient/family/caregiver demonstrates understanding of disease process, treatment plan, medications, and discharge instructions  Description: Complete learning assessment and assess knowledge base    Interventions:  - Provide teaching at level of understanding  - Provide teaching via preferred learning methods  Outcome: Progressing     Problem: RESPIRATORY - ADULT  Goal: Achieves optimal ventilation and oxygenation  Description: INTERVENTIONS:  - Assess for changes in respiratory status  - Assess for changes in mentation and behavior  - Position to facilitate oxygenation and minimize respiratory effort  - Oxygen administered by appropriate delivery if ordered  - Initiate smoking cessation education as indicated  - Encourage broncho-pulmonary hygiene including cough, deep breathe, Incentive Spirometry  - Assess the need for suctioning and aspirate as needed  - Assess and instruct to report SOB or any respiratory difficulty  - Respiratory Therapy support as indicated  Outcome: Progressing     Problem: GASTROINTESTINAL - ADULT  Goal: Minimal or absence of nausea and/or vomiting  Description: INTERVENTIONS:  - Administer IV fluids if ordered to ensure adequate hydration  - Maintain NPO status until nausea and vomiting are resolved  - Nasogastric tube if ordered  - Administer ordered antiemetic medications as needed  - Provide nonpharmacologic comfort measures as appropriate  - Advance diet as tolerated, if ordered  - Consider nutrition services referral to assist patient with adequate nutrition and appropriate food choices  Outcome: Progressing  Goal: Maintains or returns to baseline bowel function  Description: INTERVENTIONS:  - Assess bowel function  - Encourage oral fluids to ensure adequate hydration  - Administer IV fluids if ordered to ensure adequate hydration  - Administer ordered medications as needed  - Encourage mobilization and activity  - Consider nutritional services referral to assist patient with adequate nutrition and appropriate food choices  Outcome: Progressing  Goal: Maintains adequate nutritional intake  Description: INTERVENTIONS:  - Monitor percentage of each meal consumed  - Identify factors contributing to decreased intake, treat as appropriate  - Assist with meals as needed  - Monitor I&O, weight, and lab values if indicated  - Obtain nutrition services referral as needed  Outcome: Progressing     Problem: METABOLIC, FLUID AND ELECTROLYTES - ADULT  Goal: Electrolytes maintained within normal limits  Description: INTERVENTIONS:  - Monitor labs and assess patient for signs and symptoms of electrolyte imbalances  - Administer electrolyte replacement as ordered  - Monitor response to electrolyte replacements, including repeat lab results as appropriate  - Instruct patient on fluid and nutrition as appropriate  Outcome: Progressing  Goal: Fluid balance maintained  Description: INTERVENTIONS:  - Monitor labs   - Monitor I/O and WT  - Instruct patient on fluid and nutrition as appropriate  - Assess for signs & symptoms of volume excess or deficit  Outcome: Progressing  Goal: Glucose maintained within target range  Description: INTERVENTIONS:  - Monitor Blood Glucose as ordered  - Assess for signs and symptoms of hyperglycemia and hypoglycemia  - Administer ordered medications to maintain glucose within target range  - Assess nutritional intake and initiate nutrition service referral as needed  Outcome: Progressing

## 2021-03-08 ENCOUNTER — APPOINTMENT (INPATIENT)
Dept: RADIOLOGY | Facility: HOSPITAL | Age: 51
DRG: 280 | End: 2021-03-08
Payer: COMMERCIAL

## 2021-03-08 LAB
ALBUMIN FLD-MCNC: <0.6 G/DL
ALBUMIN SERPL BCP-MCNC: 2.2 G/DL (ref 3.5–5)
ALP SERPL-CCNC: 154 U/L (ref 46–116)
ALT SERPL W P-5'-P-CCNC: 18 U/L (ref 12–78)
ANION GAP SERPL CALCULATED.3IONS-SCNC: 6 MMOL/L (ref 4–13)
ANISOCYTOSIS BLD QL SMEAR: PRESENT
APPEARANCE FLD: NORMAL
AST SERPL W P-5'-P-CCNC: 29 U/L (ref 5–45)
BASOPHILS # BLD AUTO: 0.01 THOUSANDS/ΜL (ref 0–0.1)
BASOPHILS NFR BLD AUTO: 0 % (ref 0–1)
BASOPHILS NFR BLD MANUAL: 1 % (ref 0–1)
BILIRUB DIRECT SERPL-MCNC: 1.38 MG/DL (ref 0–0.2)
BILIRUB SERPL-MCNC: 2.6 MG/DL (ref 0.2–1)
BUN SERPL-MCNC: 14 MG/DL (ref 5–25)
CALCIUM SERPL-MCNC: 7.9 MG/DL (ref 8.3–10.1)
CHLORIDE SERPL-SCNC: 109 MMOL/L (ref 100–108)
CO2 SERPL-SCNC: 25 MMOL/L (ref 21–32)
COLOR FLD: YELLOW
CREAT SERPL-MCNC: 0.73 MG/DL (ref 0.6–1.3)
DACRYOCYTES BLD QL SMEAR: PRESENT
EOSINOPHIL # BLD AUTO: 0.2 THOUSAND/ΜL (ref 0–0.61)
EOSINOPHIL NFR BLD AUTO: 5 % (ref 0–6)
EOSINOPHIL NFR FLD MANUAL: 2 %
ERYTHROCYTE [DISTWIDTH] IN BLOOD BY AUTOMATED COUNT: 18.2 % (ref 11.6–15.1)
GFR SERPL CREATININE-BSD FRML MDRD: 97 ML/MIN/1.73SQ M
GLUCOSE SERPL-MCNC: 112 MG/DL (ref 65–140)
HCT VFR BLD AUTO: 25.5 % (ref 34.8–46.1)
HGB BLD-MCNC: 8 G/DL (ref 11.5–15.4)
HGB RETIC QN AUTO: 36.2 PG (ref 30–38.3)
HISTIOCYTES NFR FLD: 1 %
IMM EOSINOPHIL NFR BLD MANUAL: 3 % (ref 0–6)
IMM GRANULOCYTES # BLD AUTO: 0.03 THOUSAND/UL (ref 0–0.2)
IMM GRANULOCYTES NFR BLD AUTO: 1 % (ref 0–2)
IMM RETICS NFR: 23.9 % (ref 0–14)
LYMPHOCYTES # BLD AUTO: 0.57 THOUSANDS/ΜL (ref 0.6–4.47)
LYMPHOCYTES NFR BLD AUTO: 14 % (ref 14–44)
LYMPHOCYTES NFR BLD AUTO: 45 %
LYMPHOCYTES NFR BLD: 9 % (ref 14–44)
MACROCYTES BLD QL AUTO: PRESENT
MCH RBC QN AUTO: 33.9 PG (ref 26.8–34.3)
MCHC RBC AUTO-ENTMCNC: 31.4 G/DL (ref 31.4–37.4)
MCV RBC AUTO: 108 FL (ref 82–98)
MONOCYTES # BLD AUTO: 0.38 THOUSAND/ΜL (ref 0.17–1.22)
MONOCYTES NFR BLD AUTO: 10 %
MONOCYTES NFR BLD AUTO: 15 % (ref 4–12)
MONOCYTES NFR BLD AUTO: 9 % (ref 4–12)
NEUTROPHILS # BLD AUTO: 2.87 THOUSANDS/ΜL (ref 1.85–7.62)
NEUTS BAND NFR BLD MANUAL: 2 THOUSAND/UL
NEUTS BAND NFR FLD MANUAL: 1 %
NEUTS SEG NFR BLD AUTO: 41 %
NEUTS SEG NFR BLD AUTO: 70 % (ref 45–77)
NEUTS SEG NFR BLD AUTO: 71 % (ref 43–75)
NRBC BLD AUTO-RTO: 0 /100 WBCS
OVALOCYTES BLD QL SMEAR: PRESENT
PLATELET # BLD AUTO: 53 THOUSANDS/UL (ref 149–390)
PLATELET BLD QL SMEAR: ABNORMAL
PMV BLD AUTO: 10.5 FL (ref 8.9–12.7)
POTASSIUM SERPL-SCNC: 3.8 MMOL/L (ref 3.5–5.3)
PROT FLD-MCNC: <2 G/DL
PROT SERPL-MCNC: 5.4 G/DL (ref 6.4–8.2)
RBC # BLD AUTO: 2.36 MILLION/UL (ref 3.81–5.12)
RBC MORPH BLD: PRESENT
RETICS # AUTO: ABNORMAL 10*3/UL (ref 14097–95744)
RETICS # CALC: 5.38 % (ref 0.37–1.87)
SITE: NORMAL
SODIUM SERPL-SCNC: 140 MMOL/L (ref 136–145)
TOTAL CELLS COUNTED SPEC: 100
TOTAL CELLS COUNTED SPEC: 100
WBC # BLD AUTO: 4.06 THOUSAND/UL (ref 4.31–10.16)
WBC # FLD MANUAL: 191 /UL

## 2021-03-08 PROCEDURE — 84157 ASSAY OF PROTEIN OTHER: CPT | Performed by: INTERNAL MEDICINE

## 2021-03-08 PROCEDURE — 89051 BODY FLUID CELL COUNT: CPT | Performed by: INTERNAL MEDICINE

## 2021-03-08 PROCEDURE — 88112 CYTOPATH CELL ENHANCE TECH: CPT | Performed by: PATHOLOGY

## 2021-03-08 PROCEDURE — 88305 TISSUE EXAM BY PATHOLOGIST: CPT | Performed by: PATHOLOGY

## 2021-03-08 PROCEDURE — 82042 OTHER SOURCE ALBUMIN QUAN EA: CPT | Performed by: INTERNAL MEDICINE

## 2021-03-08 PROCEDURE — NC001 PR NO CHARGE: Performed by: INTERNAL MEDICINE

## 2021-03-08 PROCEDURE — 0W9G3ZX DRAINAGE OF PERITONEAL CAVITY, PERCUTANEOUS APPROACH, DIAGNOSTIC: ICD-10-PCS | Performed by: RADIOLOGY

## 2021-03-08 PROCEDURE — 85046 RETICYTE/HGB CONCENTRATE: CPT | Performed by: STUDENT IN AN ORGANIZED HEALTH CARE EDUCATION/TRAINING PROGRAM

## 2021-03-08 PROCEDURE — 85025 COMPLETE CBC W/AUTO DIFF WBC: CPT | Performed by: STUDENT IN AN ORGANIZED HEALTH CARE EDUCATION/TRAINING PROGRAM

## 2021-03-08 PROCEDURE — 49083 ABD PARACENTESIS W/IMAGING: CPT | Performed by: PHYSICIAN ASSISTANT

## 2021-03-08 PROCEDURE — 87205 SMEAR GRAM STAIN: CPT | Performed by: INTERNAL MEDICINE

## 2021-03-08 PROCEDURE — 80076 HEPATIC FUNCTION PANEL: CPT | Performed by: INTERNAL MEDICINE

## 2021-03-08 PROCEDURE — 80048 BASIC METABOLIC PNL TOTAL CA: CPT | Performed by: STUDENT IN AN ORGANIZED HEALTH CARE EDUCATION/TRAINING PROGRAM

## 2021-03-08 PROCEDURE — 87070 CULTURE OTHR SPECIMN AEROBIC: CPT | Performed by: INTERNAL MEDICINE

## 2021-03-08 RX ORDER — LIDOCAINE WITH 8.4% SOD BICARB 0.9%(10ML)
SYRINGE (ML) INJECTION CODE/TRAUMA/SEDATION MEDICATION
Status: COMPLETED | OUTPATIENT
Start: 2021-03-08 | End: 2021-03-08

## 2021-03-08 RX ORDER — ACETAMINOPHEN 325 MG/1
650 TABLET ORAL EVERY 6 HOURS PRN
Status: DISCONTINUED | OUTPATIENT
Start: 2021-03-08 | End: 2021-03-09 | Stop reason: HOSPADM

## 2021-03-08 RX ORDER — HYDROXYZINE HYDROCHLORIDE 25 MG/1
25 TABLET, FILM COATED ORAL EVERY 6 HOURS PRN
Status: DISCONTINUED | OUTPATIENT
Start: 2021-03-08 | End: 2021-03-09 | Stop reason: HOSPADM

## 2021-03-08 RX ORDER — FERROUS SULFATE 325(65) MG
325 TABLET ORAL EVERY OTHER DAY
Status: DISCONTINUED | OUTPATIENT
Start: 2021-03-09 | End: 2021-03-09 | Stop reason: HOSPADM

## 2021-03-08 RX ADMIN — MELATONIN TAB 3 MG 6 MG: 3 TAB at 21:44

## 2021-03-08 RX ADMIN — THIAMINE HCL TAB 100 MG 100 MG: 100 TAB at 08:32

## 2021-03-08 RX ADMIN — SPIRONOLACTONE 100 MG: 50 TABLET, FILM COATED ORAL at 08:32

## 2021-03-08 RX ADMIN — FOLIC ACID TAB 400 MCG 400 MCG: 400 TAB at 08:31

## 2021-03-08 RX ADMIN — PANTOPRAZOLE SODIUM 40 MG: 40 TABLET, DELAYED RELEASE ORAL at 06:11

## 2021-03-08 RX ADMIN — HYDROXYZINE HYDROCHLORIDE 25 MG: 25 TABLET, FILM COATED ORAL at 14:15

## 2021-03-08 RX ADMIN — ACETAMINOPHEN 650 MG: 325 TABLET, FILM COATED ORAL at 23:42

## 2021-03-08 RX ADMIN — LACTULOSE 30 G: 10 SOLUTION ORAL at 16:35

## 2021-03-08 RX ADMIN — FUROSEMIDE 60 MG: 40 TABLET ORAL at 08:31

## 2021-03-08 RX ADMIN — LORAZEPAM 0.5 MG: 0.5 TABLET ORAL at 23:17

## 2021-03-08 RX ADMIN — RIFAXIMIN 550 MG: 550 TABLET ORAL at 21:44

## 2021-03-08 RX ADMIN — LACTULOSE 30 G: 10 SOLUTION ORAL at 08:30

## 2021-03-08 RX ADMIN — TRAZODONE HYDROCHLORIDE 50 MG: 50 TABLET ORAL at 21:44

## 2021-03-08 RX ADMIN — Medication 10 ML: at 13:12

## 2021-03-08 RX ADMIN — ESCITALOPRAM OXALATE 10 MG: 10 TABLET ORAL at 21:44

## 2021-03-08 RX ADMIN — POLYETHYLENE GLYCOL 3350 17 G: 17 POWDER, FOR SOLUTION ORAL at 08:34

## 2021-03-08 RX ADMIN — CYANOCOBALAMIN TAB 500 MCG 1000 MCG: 500 TAB at 08:32

## 2021-03-08 RX ADMIN — RIFAXIMIN 550 MG: 550 TABLET ORAL at 08:32

## 2021-03-08 NOTE — BRIEF OP NOTE (RAD/CATH)
Right IR PARACENTESIS Procedure Note    PATIENT NAME: Junior Burrell  : 1970  MRN: [id number]    Pre-op Diagnosis:   1  Dyspnea    2  Edema    3  Cirrhosis of liver with ascites (HCC)      Post-op Diagnosis:   1  Dyspnea    2  Edema    3  Cirrhosis of liver with ascites [facility])        Provider:  Brenda Pineda PA-C    Supervising physician:  Dr Amelie Steele    No qualified resident was available, Resident is only observing    Estimated Blood Loss: minimal    Findings: right sided paracentesis  1L cloudy yellow fluid removed       Specimens: sent    Complications: none immediate    Anesthesia: local    Brenda Pineda PA-C     Date: 3/8/2021  Time: 1:52 PM

## 2021-03-08 NOTE — PLAN OF CARE
Problem: Potential for Falls  Goal: Patient will remain free of falls  Description: INTERVENTIONS:  - Assess patient frequently for physical needs  -  Identify cognitive and physical deficits and behaviors that affect risk of falls    -  [place] fall precautions as indicated by assessment   - Educate patient/family on patient safety including physical limitations  - Instruct patient to call for assistance with activity based on assessment  - Modify environment to reduce risk of injury  - Consider OT/PT consult to assist with strengthening/mobility  Outcome: Progressing     Problem: PAIN - ADULT  Goal: Verbalizes/displays adequate comfort level or baseline comfort level  Description: Interventions:  - Encourage patient to monitor pain and request assistance  - Assess pain using appropriate pain scale  - Administer analgesics based on type and severity of pain and evaluate response  - Implement non-pharmacological measures as appropriate and evaluate response  - Consider cultural and social influences on pain and pain management  - Notify physician/advanced practitioner if interventions unsuccessful or patient reports new pain  Outcome: Progressing     Problem: INFECTION - ADULT  Goal: Absence or prevention of progression during hospitalization  Description: INTERVENTIONS:  - Assess and monitor for signs and symptoms of infection  - Monitor lab/diagnostic results  - Monitor all insertion sites, i e  indwelling lines, tubes, and drains  - Monitor endotracheal if appropriate and nasal secretions for changes in amount and color  - [place] appropriate cooling/warming therapies per order  - Administer medications as ordered  - Instruct and encourage patient and family to use good hand hygiene technique  - Identify and instruct in appropriate isolation precautions for identified infection/condition  Outcome: Progressing  Goal: Absence of fever/infection during neutropenic period  Description: INTERVENTIONS:  - Monitor WBC    Outcome: Progressing     Problem: SAFETY ADULT  Goal: Patient will remain free of falls  Description: INTERVENTIONS:  - Assess patient frequently for physical needs  -  Identify cognitive and physical deficits and behaviors that affect risk of falls    -  [place] fall precautions as indicated by assessment   - Educate patient/family on patient safety including physical limitations  - Instruct patient to call for assistance with activity based on assessment  - Modify environment to reduce risk of injury  - Consider OT/PT consult to assist with strengthening/mobility  Outcome: Progressing  Goal: Maintain or return to baseline ADL function  Description: INTERVENTIONS:  -  Assess patient's ability to carry out ADLs; assess patient's baseline for ADL function and identify physical deficits which impact ability to perform ADLs (bathing, care of mouth/teeth, toileting, grooming, dressing, etc )  - Assess/evaluate cause of self-care deficits   - Assess range of motion  - Assess patient's mobility; develop plan if impaired  - Assess patient's need for assistive devices and provide as appropriate  - Encourage maximum independence but intervene and supervise when necessary  - Involve family in performance of ADLs  - Assess for home care needs following discharge   - Consider OT consult to assist with ADL evaluation and planning for discharge  - Provide patient education as appropriate  Outcome: Progressing  Goal: Maintain or return mobility status to optimal level  Description: INTERVENTIONS:  - Assess patient's baseline mobility status (ambulation, transfers, stairs, etc )    - Identify cognitive and physical deficits and behaviors that affect mobility  - Identify mobility aids required to assist with transfers and/or ambulation (gait belt, sit-to-stand, lift, walker, cane, etc )  - [place] fall precautions as indicated by assessment  - Record patient progress and toleration of activity level on Mobility SBAR; progress patient to next Phase/Stage  - Instruct patient to call for assistance with activity based on assessment  - Consider rehabilitation consult to assist with strengthening/weightbearing, etc   Outcome: Progressing     Problem: DISCHARGE PLANNING  Goal: Discharge to home or other facility with appropriate resources  Description: INTERVENTIONS:  - Identify barriers to discharge w/patient and caregiver  - Arrange for needed discharge resources and transportation as appropriate  - Identify discharge learning needs (meds, wound care, etc )  - Arrange for interpretive services to assist at discharge as needed  - Refer to Case Management Department for coordinating discharge planning if the patient needs post-hospital services based on physician/advanced practitioner order or complex needs related to functional status, cognitive ability, or social support system  Outcome: Progressing     Problem: Knowledge Deficit  Goal: Patient/family/caregiver demonstrates understanding of disease process, treatment plan, medications, and discharge instructions  Description: Complete learning assessment and assess knowledge base    Interventions:  - Provide teaching at level of understanding  - Provide teaching via preferred learning methods  Outcome: Progressing     Problem: RESPIRATORY - ADULT  Goal: Achieves optimal ventilation and oxygenation  Description: INTERVENTIONS:  - Assess for changes in respiratory status  - Assess for changes in mentation and behavior  - Position to facilitate oxygenation and minimize respiratory effort  - Oxygen administered by appropriate delivery if ordered  - Initiate smoking cessation education as indicated  - Encourage broncho-pulmonary hygiene including cough, deep breathe, Incentive Spirometry  - Assess the need for suctioning and aspirate as needed  - Assess and instruct to report SOB or any respiratory difficulty  - Respiratory Therapy support as indicated  Outcome: Progressing     Problem: GASTROINTESTINAL - ADULT  Goal: Minimal or absence of nausea and/or vomiting  Description: INTERVENTIONS:  - Administer IV fluids if ordered to ensure adequate hydration  - Maintain NPO status until nausea and vomiting are resolved  - Nasogastric tube if ordered  - Administer ordered antiemetic medications as needed  - Provide nonpharmacologic comfort measures as appropriate  - Advance diet as tolerated, if ordered  - Consider nutrition services referral to assist patient with adequate nutrition and appropriate food choices  Outcome: Progressing  Goal: Maintains or returns to baseline bowel function  Description: INTERVENTIONS:  - Assess bowel function  - Encourage oral fluids to ensure adequate hydration  - Administer IV fluids if ordered to ensure adequate hydration  - Administer ordered medications as needed  - Encourage mobilization and activity  - Consider nutritional services referral to assist patient with adequate nutrition and appropriate food choices  Outcome: Progressing  Goal: Maintains adequate nutritional intake  Description: INTERVENTIONS:  - Monitor percentage of each meal consumed  - Identify factors contributing to decreased intake, treat as appropriate  - Assist with meals as needed  - Monitor I&O, weight, and lab values if indicated  - Obtain nutrition services referral as needed  Outcome: Progressing     Problem: METABOLIC, FLUID AND ELECTROLYTES - ADULT  Goal: Electrolytes maintained within normal limits  Description: INTERVENTIONS:  - Monitor labs and assess patient for signs and symptoms of electrolyte imbalances  - Administer electrolyte replacement as ordered  - Monitor response to electrolyte replacements, including repeat lab results as appropriate  - Instruct patient on fluid and nutrition as appropriate  Outcome: Progressing  Goal: Fluid balance maintained  Description: INTERVENTIONS:  - Monitor labs   - Monitor I/O and WT  - Instruct patient on fluid and nutrition as appropriate  - Assess for signs & symptoms of volume excess or deficit  Outcome: Progressing  Goal: Glucose maintained within target range  Description: INTERVENTIONS:  - Monitor Blood Glucose as ordered  - Assess for signs and symptoms of hyperglycemia and hypoglycemia  - Administer ordered medications to maintain glucose within target range  - Assess nutritional intake and initiate nutrition service referral as needed  Outcome: Progressing

## 2021-03-08 NOTE — PROGRESS NOTES
INTERNAL MEDICINE RESIDENCY PROGRESS NOTE     Name: Bibi Carr   Age & Sex: 53 y o  female   MRN: [id number]  Unit/Bed#: [facility] 705-01   Encounter: [id number]  Team: SOD Team A    PATIENT INFORMATION     Name: Bibi Carr   Age & Sex: 53 y o  female   MRN: [id number]  Hospital Stay Days: 1    ASSESSMENT/PLAN     Principal Problem:    Alcoholic cirrhosis ([place] [address] )  Active Problems:    Shortness of breath    Macrocytic anemia    Depression    Thrombocytopenia (HCC)      * Alcoholic cirrhosis ([place] [address] )  Assessment & Plan  History of alcoholic cirrhosis  Follows with gastroenterologist, Dr Paige Mera with [facility], as an outpatient     · Etiology: Alcohol, abstinent since 1 year   · Stage: 3 (+/-) non-bleeding varices, (+ascites)   · Compensated: No; DECOMPENSATED due to history of variceal bleeding, Ascites, Hepatic Encephalopathy and Jaundice  · Vero Hidalgo Score: Class C   · MELD: 18 (has not started transplant evaluation yet)  · [place] [address]  Screening: Up to date  · Ultrasound RUQ with liver dopplers: Cirrhosis with nodular contour, ascites, evidence of portal hypertension with dilation of main portal vein, splenomegaly, gallstones without sonographic Weiss sign, and wall thickening and pericholecystic fluid possibly related to hepatic disease  · Variceal screening: Up to date; h/o variceal bleeding- small varices noted on EGD and severe portal hypertensive gastropathy- done on 11/02  · Varices intervention: Patient is not on Beta blockers because of history of bradycardia and hypotension  · Ascites intervention: reports that she had a paracentesis almost 1 5 years ago with 6L takes out    Plan:  · Ascites:  · Spirolactone 100 mg qdaily  · Furosemide 60 mg qdaily  · IR paracentesis with labs to r/o SBP; ordered and pending  · Hepatic Encephalopathy:  · Lactulose 30g TID; titrate to produce 3 BM/day  · Rifaximin 550 mcg BID    Shortness of breath  Assessment & Plan  Patient present with acute onset SOB in the setting of worsening abdominal distension and b/l LE pitting edema  Patient presenting from [place] where she states she was not receiving her lactulose and has not had a BM in 2 day  Unclear etiology of SOB but differential includes anemia vs acute decompensated heart failure vs hepatopulmonary syndrome  On arrival, hemoglobin 7 6 and then dropped to 6 5  Patient now s/p 1u pRBCs  Presence of abdominal distension with liver dopplers demonstrating presence of cirrhosis, portal HTN with dilation of main portal vein, splenomegaly and gallstones  NT-proBNP 246 and Echo with an EF of 60%  CXR demonstrating pulmonary vascular congestion  Plan:  · Lasix 60 mg qdaily  · Aldactone 100 mg qdaily  · Strict ins and outs  · Daily weights  · Diet: CV 2 gram Na+ and 2000 mL fluid restriction    Macrocytic anemia  Assessment & Plan  Patient presenting with a MCV of 112  Per chart review, this appears's chronic in nature ( in 10/2019) but patient is now symptomatic and complaining of SOB  Given TSH WNL, elevated vitamin B12 of 2,699 and folate of >20 0, macrocytosis likely secondary to chronic alcoholism and liver dysfunction 2/2 cirrhosis  However given evidence of pancytopenia, could consider workup for MDS  Plan:  · Vitamin L12 1194 mcg  · Folic Acid 149 mcg   · Follow-up reticulocyte count  · Can consider workup for MDS     Thrombocytopenia (HCC)  Assessment & Plan  Platelets of 58 occurring in the setting of cirrhosis  Patient currently stable without signs of active bleeding  Platelets 53 today  Plan:  · Continue to monitor with daily CBC  · Transfuse for platelets <03,862    Depression  Assessment & Plan  History of Depression:     Plan:  · Lexapro 10 mg daily  · Trazodone 50 mg qHS  · Melatonin 6 mg qdaily  · Ativan 0 5 mg q8 hours PRN      Disposition:  Will continue to require inpatient level care  Patient currently awaiting paracentesis  SUBJECTIVE     Per nursing in night shift, no events overnight  Patient seen and examined at bedside  Patient acute distress or visible discomfort  Currently saturating appropriately on room air and lying flat in bed  Only complaint this morning is generalized abdominal pain along with increased pruritus  Patient denies shortness of breath or chest pain at this time  OBJECTIVE     Vitals:    21 1624 21 2156 21 0541 21 0750   BP: 114/63 127/67  112/58   Pulse: 78 85  74   Resp: 16 18  16   Temp: 98 6 °F (37 °C) 98 6 °F (37 °C)  98 3 °F (36 8 °C)   TempSrc:       SpO2: 94% 95%  94%   Weight:   74 kg (163 lb 2 3 oz)    Height:          Temperature:   Temp (24hrs), Av 5 °F (36 9 °C), Min:98 3 °F (36 8 °C), Max:98 6 °F (37 °C)    Temperature: 98 3 °F (36 8 °C)  Intake & Output:  I/O       701 -  07 -  07    P  O  840 480    Blood 422 9     Total Intake(mL/kg) 1262 9 (16 8) 480 (6 5)    Urine (mL/kg/hr) 1650 (0 9)     Stool 0     Total Output 1650     Net -387 1 +480          Unmeasured Urine Occurrence 2 x     Unmeasured Stool Occurrence 1 x 1 x        Weights:   IBW: 54 7 kg    Body mass index is 28 kg/m²  Weight (last 2 days)     Date/Time   Weight    21 0541   74 (163 14)    21 0600   75 3 (166 01)    21 0600   79 2 (174 6)            Physical Exam  Constitutional:       General: She is not in acute distress  Appearance: Normal appearance  She is not ill-appearing  HENT:      Head: Normocephalic and atraumatic  Nose: Nose normal  No congestion  Mouth/Throat:      Mouth: Mucous membranes are moist       Pharynx: Oropharynx is clear  No oropharyngeal exudate  Eyes:      General: Scleral icterus present  Conjunctiva/sclera: Conjunctivae normal    Neck:      Musculoskeletal: Normal range of motion  No neck rigidity  Cardiovascular:      Rate and Rhythm: Normal rate and regular rhythm  Pulses: Normal pulses        Heart sounds: Murmur (Lab systolic murmur auscultated across precordium) present  Pulmonary:      Effort: Pulmonary effort is normal  No respiratory distress  Breath sounds: Normal breath sounds  No wheezing  Abdominal:      General: Bowel sounds are normal  There is distension  Palpations: Abdomen is soft  Tenderness: There is abdominal tenderness  There is no guarding or rebound  Hernia: A hernia (umbilical hernia) is present  Comments: Soft but moderately distended abdomen with normal active bowel sounds  Tender to deep palpation of right upper quadrant  Negative rebound and guarding present  Musculoskeletal:      Right lower leg: Edema present  Left lower leg: Edema present  Comments: Plus two bilateral lower extremity edema extending up to bilateral knees   Skin:     General: Skin is warm and dry  Capillary Refill: Capillary refill takes less than 2 seconds  Coloration: Skin is jaundiced (Mild jaundice)  Neurological:      Mental Status: She is alert and oriented to person, place, and time  Mental status is at baseline  Motor: No weakness  Comments: No asterixis, AAOx3   Psychiatric:         Mood and Affect: Mood normal          Behavior: Behavior normal        LABORATORY DATA     Labs: I have personally reviewed pertinent reports  Results from last 7 days   Lab Units 03/08/21  0541 03/07/21 0615 03/06/21  1749  03/06/21  0454   WBC Thousand/uL 4 06* 3 50*  --   --  3 51*   HEMOGLOBIN g/dL 8 0* 7 7* 8 9*   < > 6 5*   HEMATOCRIT % 25 5* 24 3* 28 2*   < > 20 8*   PLATELETS Thousands/uL 53* 51*  --   --  50*   NEUTROS PCT % 71  --   --   --  63   MONOS PCT % 9  --   --   --  12    < > = values in this interval not displayed        Results from last 7 days   Lab Units 03/08/21  0643 03/08/21  0541 03/07/21  0615 03/06/21  0454   POTASSIUM mmol/L  --  3 8 4 1 3 4*   CHLORIDE mmol/L  --  109* 108 107   CO2 mmol/L  --  25 26 25   BUN mg/dL  --  14 14 14   CREATININE mg/dL  --  0 73 0 86 0 75   CALCIUM mg/dL  -- 7  9* 8 2* 7 7*   ALK PHOS U/L 154*  --  126* 101   ALT U/L 18  --  18 17   AST U/L 29  --  26 25              Results from last 7 days   Lab Units 03/05/21 1914   INR  1 68*   PTT seconds 36         Results from last 7 days   Lab Units 03/05/21 1914   TROPONIN I ng/mL <0 02       IMAGING & DIAGNOSTIC TESTING     Radiology Results: I have personally reviewed pertinent reports  X-ray Chest 1 View Portable    Result Date: 3/6/2021  Impression: Mild pulmonary vascular congestion  Workstation performed: [id number]     Us Right Upper Quadrant With Liver Dopplers    Result Date: 3/7/2021  Impression: Findings are consistent with cirrhosis with a nodular contour with no definite focal lesion  Ascites Evidence of portal hypertension with dilatation of the main portal vein but hepatopedal flow  Splenomegaly Gallstones without sonographic Weiss's sign  There is wall thickening and pericholecystic fluid but this could also be related to hepatic disease and should be correlated with any acute symptomatology  The study was marked in EPIC for significant notification  Workstation performed: [id number]     Other Diagnostic Testing: I have personally reviewed pertinent reports      ACTIVE MEDICATIONS     Current Facility-Administered Medications   Medication Dose Route Frequency    cyanocobalamin (VITAMIN B-12) tablet 1,000 mcg  1,000 mcg Oral Daily    escitalopram (LEXAPRO) tablet 10 mg  10 mg Oral Daily    ferrous sulfate tablet 325 mg  325 mg Oral Daily With Breakfast    folic acid (FOLVITE) tablet 400 mcg  400 mcg Oral Daily    furosemide (LASIX) tablet 60 mg  60 mg Oral Daily    heparin (porcine) subcutaneous injection 5,000 Units  5,000 Units Subcutaneous Q8H [address]    lactulose oral solution 30 g  30 g Oral TID    LORazepam (ATIVAN) tablet 0 5 mg  0 5 mg Oral Q8H PRN    melatonin tablet 6 mg  6 mg Oral HS    pantoprazole (PROTONIX) EC tablet 40 mg  40 mg Oral Early Morning    polyethylene glycol (MIRALAX) packet 17 g  17 g Oral Daily    rifaximin (XIFAXAN) tablet 550 mg  550 mg Oral BID    sodium chloride (PF) 0 9 % injection 3 mL  3 mL Intravenous Q1H PRN    spironolactone (ALDACTONE) tablet 100 mg  100 mg Oral Daily    thiamine tablet 100 mg  100 mg Oral Daily    traZODone (DESYREL) tablet 50 mg  50 mg Oral HS       VTE Pharmacologic Prophylaxis: Heparin  VTE Mechanical Prophylaxis: sequential compression device    Portions of the record may have been created with voice recognition software  Occasional wrong word or "sound a like" substitutions may have occurred due to the inherent limitations of voice recognition software    Read the chart carefully and recognize, using context, where substitutions have occurred   ==  Miryam Renee, [facility] [facility]  Internal Medicine Residency PGY-1

## 2021-03-08 NOTE — PLAN OF CARE
Problem: Potential for Falls  Goal: Patient will remain free of falls  Description: INTERVENTIONS:  - Assess patient frequently for physical needs  -  Identify cognitive and physical deficits and behaviors that affect risk of falls    -  [place] fall precautions as indicated by assessment   - Educate patient/family on patient safety including physical limitations  - Instruct patient to call for assistance with activity based on assessment  - Modify environment to reduce risk of injury  - Consider OT/PT consult to assist with strengthening/mobility  Outcome: Progressing     Problem: PAIN - ADULT  Goal: Verbalizes/displays adequate comfort level or baseline comfort level  Description: Interventions:  - Encourage patient to monitor pain and request assistance  - Assess pain using appropriate pain scale  - Administer analgesics based on type and severity of pain and evaluate response  - Implement non-pharmacological measures as appropriate and evaluate response  - Consider cultural and social influences on pain and pain management  - Notify physician/advanced practitioner if interventions unsuccessful or patient reports new pain  Outcome: Progressing     Problem: INFECTION - ADULT  Goal: Absence or prevention of progression during hospitalization  Description: INTERVENTIONS:  - Assess and monitor for signs and symptoms of infection  - Monitor lab/diagnostic results  - Monitor all insertion sites, i e  indwelling lines, tubes, and drains  - Monitor endotracheal if appropriate and nasal secretions for changes in amount and color  - [place] appropriate cooling/warming therapies per order  - Administer medications as ordered  - Instruct and encourage patient and family to use good hand hygiene technique  - Identify and instruct in appropriate isolation precautions for identified infection/condition  Outcome: Progressing  Goal: Absence of fever/infection during neutropenic period  Description: INTERVENTIONS:  - Monitor WBC    Outcome: Progressing     Problem: SAFETY ADULT  Goal: Patient will remain free of falls  Description: INTERVENTIONS:  - Assess patient frequently for physical needs  -  Identify cognitive and physical deficits and behaviors that affect risk of falls    -  [place] fall precautions as indicated by assessment   - Educate patient/family on patient safety including physical limitations  - Instruct patient to call for assistance with activity based on assessment  - Modify environment to reduce risk of injury  - Consider OT/PT consult to assist with strengthening/mobility  Outcome: Progressing  Goal: Maintain or return to baseline ADL function  Description: INTERVENTIONS:  -  Assess patient's ability to carry out ADLs; assess patient's baseline for ADL function and identify physical deficits which impact ability to perform ADLs (bathing, care of mouth/teeth, toileting, grooming, dressing, etc )  - Assess/evaluate cause of self-care deficits   - Assess range of motion  - Assess patient's mobility; develop plan if impaired  - Assess patient's need for assistive devices and provide as appropriate  - Encourage maximum independence but intervene and supervise when necessary  - Involve family in performance of ADLs  - Assess for home care needs following discharge   - Consider OT consult to assist with ADL evaluation and planning for discharge  - Provide patient education as appropriate  Outcome: Progressing  Goal: Maintain or return mobility status to optimal level  Description: INTERVENTIONS:  - Assess patient's baseline mobility status (ambulation, transfers, stairs, etc )    - Identify cognitive and physical deficits and behaviors that affect mobility  - Identify mobility aids required to assist with transfers and/or ambulation (gait belt, sit-to-stand, lift, walker, cane, etc )  - [place] fall precautions as indicated by assessment  - Record patient progress and toleration of activity level on Mobility SBAR; progress patient to next Phase/Stage  - Instruct patient to call for assistance with activity based on assessment  - Consider rehabilitation consult to assist with strengthening/weightbearing, etc   Outcome: Progressing     Problem: DISCHARGE PLANNING  Goal: Discharge to home or other facility with appropriate resources  Description: INTERVENTIONS:  - Identify barriers to discharge w/patient and caregiver  - Arrange for needed discharge resources and transportation as appropriate  - Identify discharge learning needs (meds, wound care, etc )  - Arrange for interpretive services to assist at discharge as needed  - Refer to Case Management Department for coordinating discharge planning if the patient needs post-hospital services based on physician/advanced practitioner order or complex needs related to functional status, cognitive ability, or social support system  Outcome: Progressing     Problem: Knowledge Deficit  Goal: Patient/family/caregiver demonstrates understanding of disease process, treatment plan, medications, and discharge instructions  Description: Complete learning assessment and assess knowledge base    Interventions:  - Provide teaching at level of understanding  - Provide teaching via preferred learning methods  Outcome: Progressing     Problem: RESPIRATORY - ADULT  Goal: Achieves optimal ventilation and oxygenation  Description: INTERVENTIONS:  - Assess for changes in respiratory status  - Assess for changes in mentation and behavior  - Position to facilitate oxygenation and minimize respiratory effort  - Oxygen administered by appropriate delivery if ordered  - Initiate smoking cessation education as indicated  - Encourage broncho-pulmonary hygiene including cough, deep breathe, Incentive Spirometry  - Assess the need for suctioning and aspirate as needed  - Assess and instruct to report SOB or any respiratory difficulty  - Respiratory Therapy support as indicated  Outcome: Progressing     Problem: GASTROINTESTINAL - ADULT  Goal: Minimal or absence of nausea and/or vomiting  Description: INTERVENTIONS:  - Administer IV fluids if ordered to ensure adequate hydration  - Maintain NPO status until nausea and vomiting are resolved  - Nasogastric tube if ordered  - Administer ordered antiemetic medications as needed  - Provide nonpharmacologic comfort measures as appropriate  - Advance diet as tolerated, if ordered  - Consider nutrition services referral to assist patient with adequate nutrition and appropriate food choices  Outcome: Progressing  Goal: Maintains or returns to baseline bowel function  Description: INTERVENTIONS:  - Assess bowel function  - Encourage oral fluids to ensure adequate hydration  - Administer IV fluids if ordered to ensure adequate hydration  - Administer ordered medications as needed  - Encourage mobilization and activity  - Consider nutritional services referral to assist patient with adequate nutrition and appropriate food choices  Outcome: Progressing  Goal: Maintains adequate nutritional intake  Description: INTERVENTIONS:  - Monitor percentage of each meal consumed  - Identify factors contributing to decreased intake, treat as appropriate  - Assist with meals as needed  - Monitor I&O, weight, and lab values if indicated  - Obtain nutrition services referral as needed  Outcome: Progressing     Problem: METABOLIC, FLUID AND ELECTROLYTES - ADULT  Goal: Electrolytes maintained within normal limits  Description: INTERVENTIONS:  - Monitor labs and assess patient for signs and symptoms of electrolyte imbalances  - Administer electrolyte replacement as ordered  - Monitor response to electrolyte replacements, including repeat lab results as appropriate  - Instruct patient on fluid and nutrition as appropriate  Outcome: Progressing  Goal: Fluid balance maintained  Description: INTERVENTIONS:  - Monitor labs   - Monitor I/O and WT  - Instruct patient on fluid and nutrition as appropriate  - Assess for signs & symptoms of volume excess or deficit  Outcome: Progressing  Goal: Glucose maintained within target range  Description: INTERVENTIONS:  - Monitor Blood Glucose as ordered  - Assess for signs and symptoms of hyperglycemia and hypoglycemia  - Administer ordered medications to maintain glucose within target range  - Assess nutritional intake and initiate nutrition service referral as needed  Outcome: Progressing

## 2021-03-08 NOTE — UTILIZATION REVIEW
Initial Clinical Review    Admission: Date/Time/Statement:     Observation 3-5-21 6243  Changed to inpatient 3-7-21 1626 for continued treatment of alcoholic cirrhosis    Inpatient Admission Once     Question Answer   Level of Care Med Surg   Estimated length of stay More than 2 Midnights   Certification I certify that inpatient services are medically necessary for this patient for a duration of greater than two midnights  See H&P and MD Progress Notes for additional information about the patient's course of treatment  03/07/21 1626     ED Arrival Information     Expected Arrival Acuity Means of Arrival Escorted By Service Admission Type    - 3/5/2021 18:17 Urgent Ambulance [facility] Urgent    Arrival Complaint    abd pain        Chief Complaint   Patient presents with    Shortness of Breath     patient states she started having shortness of breath last night as well as some confusion  Patient states she also has abdominal pain and swollen legs/ankles  Assessment/Plan:  52 y/o female with PMHx of decompensated alcoholic cirrhosis of the liver with jaundice, ascites, encephalopathy and h/o bleeding varices, diastolic heart failure, depression who presents from her nursing home with sob, increased abdominal distention and pain, lack of bowel movements, swollen legs and confusion with altered sleep cycles  Reports SOB started 1-2 days ago, and abd distention gradually getting worse  Also reports some confusion and sleep disturbance  Follows with GI and gets lactulose regularly at SNF  Also reports some leg swelling  Hgb 7 5, Hct 24 6, Plt 58  Ca 7 9, Alk phos 140  Ucx + e-coli  CXR with some pulm vasc congestion  Admit observation to M/S/Tele unit with alcoholic cirrhosis --  diuretics- spironolactone, furosemide, paracentesis  Consult IR for possible paracentesis  Lactulose, rifaximin- titrate lactulose to produce 3 BM's/day  Lasix IV x1, then continue home po diuretics and monitor       ED Triage Vitals [03/05/21 1831]   Temperature Pulse Respirations Blood Pressure SpO2   98 1 °F (36 7 °C) 76 18 140/67 98 %      Temp Source Heart Rate Source Patient Position - Orthostatic VS BP Location FiO2 (%)   Oral Monitor Lying Right arm --      Pain Score       5          Wt Readings from Last 1 Encounters:   03/06/21 79 2 kg (174 lb 9 7 oz)     Additional Vital Signs:   Date/Time  Temp  Pulse  Resp  BP  MAP (mmHg)  SpO2  O2 Device   03/06/21 0800  --  --  --  --  --  --  None (Room air)   03/06/21 07:30:16  98 7 °F (37 1 °C)  82  19  121/64  83  96 %  --   03/06/21 0300  --  --  --  --  --  --  None (Room air)   03/06/21 02:49:06  98 3 °F (36 8 °C)  72  18  131/66  88  97 %  --   03/06/21 0030  --  82  18  137/69  --  97 %  None (Room air)   03/05/21 2227  --  77  16  128/67  --  97 %  None (Room air)   03/05/21 1915  --  --  --  --  --  --  None (Room air)   03/05/21 1831  98 1 °F (36 7 °C)  76  18  140/67  107  98 %  None (Room air)       Pertinent Labs/Diagnostic Test Results:   EKG 3/5 -- NSR  CXR 3/6 -- Mild pulmonary vascular congestion    Results from last 7 days   Lab Units 03/06/21  0005   SARS-COV-2  Negative     Results from last 7 days   Lab Units 03/06/21  1003 03/06/21  0454 03/06/21  0005 03/05/21 1914   WBC Thousand/uL  --  3 51*  --  5 14   HEMOGLOBIN g/dL 7 0* 6 5*  --  7 6*   HEMATOCRIT % 22 4* 20 8*  --  24 6*   PLATELETS Thousands/uL  --  50* 55* 58*   NEUTROS ABS Thousands/µL  --  2 21  --   --        Results from last 7 days   Lab Units 03/06/21  0454 03/05/21  1914   SODIUM mmol/L 140 139   POTASSIUM mmol/L 3 4* 3 6   CHLORIDE mmol/L 107 107   CO2 mmol/L 25 25   ANION GAP mmol/L 8 7   BUN mg/dL 14 12   CREATININE mg/dL 0 75 0 73   EGFR ml/min/1 73sq m 94 97   CALCIUM mg/dL 7 7* 7 9*     Results from last 7 days   Lab Units 03/06/21  0454 03/05/21  1914   AST U/L 25 32   ALT U/L 17 20   ALK PHOS U/L 101 140*   TOTAL PROTEIN g/dL 5 0* 5 8*   ALBUMIN g/dL 2 1* 2 5*   TOTAL BILIRUBIN mg/dL 4 09* 3 81*   BILIRUBIN DIRECT mg/dL 1 73*  --    AMMONIA umol/L  --  47*     Results from last 7 days   Lab Units 03/06/21  0454 03/05/21 1914   GLUCOSE RANDOM mg/dL 108 100     Results from last 7 days   Lab Units 03/05/21 1914   TROPONIN I ng/mL <0 02     Results from last 7 days   Lab Units 03/05/21 1914   PROTIME seconds 19 8*   INR  1 68*   PTT seconds 36     Results from last 7 days   Lab Units 03/06/21  0638   TSH 3RD GENERATON uIU/mL 1 370     Results from last 7 days   Lab Units 03/05/21 1914   NT-PRO BNP pg/mL 246*     Results from last 7 days   Lab Units 03/06/21  0005   INFLUENZA A PCR  Negative   INFLUENZA B PCR  Negative   RSV PCR  Negative     ED Treatment:   Medication Administration from 03/05/2021 1817 to 03/06/2021 9157       Date/Time Order Dose Route Action     03/05/2021 2257 escitalopram (LEXAPRO) tablet 10 mg 10 mg Oral Given     03/05/2021 2257 melatonin tablet 6 mg 6 mg Oral Given     03/05/2021 2256 rifaximin (XIFAXAN) tablet 550 mg 550 mg Oral Given     03/05/2021 2257 traZODone (DESYREL) tablet 50 mg 50 mg Oral Given     03/05/2021 2256 acetaminophen (TYLENOL) tablet 975 mg 975 mg Oral Given     03/06/2021 0005 potassium chloride (K-DUR,KLOR-CON) CR tablet 20 mEq 20 mEq Oral Given     03/06/2021 0005 heparin (porcine) subcutaneous injection 5,000 Units 5,000 Units Subcutaneous Given     03/06/2021 0005 hydrOXYzine HCL (ATARAX) tablet 50 mg 50 mg Oral Given     03/06/2021 0005 furosemide (LASIX) injection 40 mg 40 mg Intravenous Given     Past Medical History:   Diagnosis Date    Alcohol abuse     Cirrhosis ([facility] )     Hypertension      Present on Admission:   Alcoholic cirrhosis ([facility] )      Admitting Diagnosis: Edema [R60 9]  Dyspnea [R06 00]  SOB (shortness of breath) [R06 02]  Cirrhosis of liver with ascites ([facility] ) [K74 60, R18 8]  Age/Sex: 53 y o  female  Admission Orders:  Scheduled Medications:  cyanocobalamin, 1,000 mcg, Oral, Daily  escitalopram, 10 mg, Oral, Daily  ferrous sulfate, 325 mg, Oral, Daily With Breakfast  folic acid, 121 mcg, Oral, Daily  furosemide, 40 mg, Intravenous, Once  furosemide, 40 mg, Intravenous, Once  [START ON 3/7/2021] furosemide, 60 mg, Oral, Daily  heparin (porcine), 5,000 Units, Subcutaneous, Q8H CHIKA  lactulose, 20 g, Oral, Once  lactulose, 20 g, Oral, TID  melatonin, 6 mg, Oral, HS  pantoprazole, 40 mg, Oral, Early Morning  polyethylene glycol, 17 g, Oral, Daily  rifaximin, 550 mg, Oral, BID  spironolactone, 100 mg, Oral, Daily  thiamine, 100 mg, Oral, Daily  traZODone, 50 mg, Oral, HS      PRN Meds:  LORazepam, 0 5 mg, Oral, Q8H PRN  sodium chloride (PF), 3 mL, Intravenous, Q1H PRN      Network Utilization Review Department  ATTENTION: Please call with any questions or concerns to [phone] and carefully listen to the prompts so that you are directed to the right person  All voicemails are confidential   Lillie Bailey all requests for admission clinical reviews, approved or denied determinations and any other requests to dedicated fax number below belonging to the campus where the patient is receiving treatment   List of dedicated fax numbers for the Facilities:  [address] DENIALS (Administrative/Medical Necessity) [phone]   [address] (Maternity/NICU/Pediatrics) [phone] [address] Donnie Cain 9505 (  [facility]) [address] [address] [place] [address] [address]81 [address] [facility]) [address] [place] [address] 951 [phone]

## 2021-03-09 VITALS
DIASTOLIC BLOOD PRESSURE: 68 MMHG | HEIGHT: 64 IN | TEMPERATURE: 98.2 F | WEIGHT: 165.12 LBS | OXYGEN SATURATION: 96 % | BODY MASS INDEX: 28.19 KG/M2 | RESPIRATION RATE: 18 BRPM | SYSTOLIC BLOOD PRESSURE: 127 MMHG | HEART RATE: 70 BPM

## 2021-03-09 PROBLEM — R06.02 SHORTNESS OF BREATH: Status: RESOLVED | Noted: 2021-03-05 | Resolved: 2021-03-09

## 2021-03-09 LAB
ALBUMIN SERPL BCP-MCNC: 2 G/DL (ref 3.5–5)
ALP SERPL-CCNC: 129 U/L (ref 46–116)
ALT SERPL W P-5'-P-CCNC: 17 U/L (ref 12–78)
ANION GAP SERPL CALCULATED.3IONS-SCNC: 7 MMOL/L (ref 4–13)
AST SERPL W P-5'-P-CCNC: 27 U/L (ref 5–45)
BASOPHILS # BLD AUTO: 0 THOUSANDS/ΜL (ref 0–0.1)
BASOPHILS NFR BLD AUTO: 0 % (ref 0–1)
BILIRUB SERPL-MCNC: 3.09 MG/DL (ref 0.2–1)
BUN SERPL-MCNC: 12 MG/DL (ref 5–25)
CALCIUM ALBUM COR SERPL-MCNC: 9.6 MG/DL (ref 8.3–10.1)
CALCIUM SERPL-MCNC: 8 MG/DL (ref 8.3–10.1)
CHLORIDE SERPL-SCNC: 107 MMOL/L (ref 100–108)
CO2 SERPL-SCNC: 26 MMOL/L (ref 21–32)
CREAT SERPL-MCNC: 0.74 MG/DL (ref 0.6–1.3)
EOSINOPHIL # BLD AUTO: 0.2 THOUSAND/ΜL (ref 0–0.61)
EOSINOPHIL NFR BLD AUTO: 6 % (ref 0–6)
ERYTHROCYTE [DISTWIDTH] IN BLOOD BY AUTOMATED COUNT: 17.4 % (ref 11.6–15.1)
GFR SERPL CREATININE-BSD FRML MDRD: 95 ML/MIN/1.73SQ M
GLUCOSE SERPL-MCNC: 104 MG/DL (ref 65–140)
HCT VFR BLD AUTO: 24.5 % (ref 34.8–46.1)
HGB BLD-MCNC: 7.7 G/DL (ref 11.5–15.4)
IMM GRANULOCYTES # BLD AUTO: 0.01 THOUSAND/UL (ref 0–0.2)
IMM GRANULOCYTES NFR BLD AUTO: 0 % (ref 0–2)
LYMPHOCYTES # BLD AUTO: 0.58 THOUSANDS/ΜL (ref 0.6–4.47)
LYMPHOCYTES NFR BLD AUTO: 17 % (ref 14–44)
MCH RBC QN AUTO: 33.9 PG (ref 26.8–34.3)
MCHC RBC AUTO-ENTMCNC: 31.4 G/DL (ref 31.4–37.4)
MCV RBC AUTO: 108 FL (ref 82–98)
MONOCYTES # BLD AUTO: 0.36 THOUSAND/ΜL (ref 0.17–1.22)
MONOCYTES NFR BLD AUTO: 10 % (ref 4–12)
NEUTROPHILS # BLD AUTO: 2.35 THOUSANDS/ΜL (ref 1.85–7.62)
NEUTS SEG NFR BLD AUTO: 67 % (ref 43–75)
NRBC BLD AUTO-RTO: 0 /100 WBCS
PLATELET # BLD AUTO: 50 THOUSANDS/UL (ref 149–390)
PMV BLD AUTO: 10.7 FL (ref 8.9–12.7)
POTASSIUM SERPL-SCNC: 3.6 MMOL/L (ref 3.5–5.3)
PROT SERPL-MCNC: 5 G/DL (ref 6.4–8.2)
RBC # BLD AUTO: 2.27 MILLION/UL (ref 3.81–5.12)
SODIUM SERPL-SCNC: 140 MMOL/L (ref 136–145)
WBC # BLD AUTO: 3.5 THOUSAND/UL (ref 4.31–10.16)

## 2021-03-09 PROCEDURE — NC001 PR NO CHARGE: Performed by: INTERNAL MEDICINE

## 2021-03-09 PROCEDURE — 85025 COMPLETE CBC W/AUTO DIFF WBC: CPT | Performed by: STUDENT IN AN ORGANIZED HEALTH CARE EDUCATION/TRAINING PROGRAM

## 2021-03-09 PROCEDURE — 80053 COMPREHEN METABOLIC PANEL: CPT | Performed by: STUDENT IN AN ORGANIZED HEALTH CARE EDUCATION/TRAINING PROGRAM

## 2021-03-09 RX ORDER — FUROSEMIDE 20 MG/1
60 TABLET ORAL DAILY
Qty: 30 TABLET | Refills: 2 | Status: SHIPPED | OUTPATIENT
Start: 2021-03-09

## 2021-03-09 RX ORDER — SPIRONOLACTONE 100 MG/1
100 TABLET, FILM COATED ORAL DAILY
Qty: 30 TABLET | Refills: 1 | Status: SHIPPED | OUTPATIENT
Start: 2021-03-09

## 2021-03-09 RX ORDER — POTASSIUM CHLORIDE 20 MEQ/1
40 TABLET, EXTENDED RELEASE ORAL ONCE
Status: COMPLETED | OUTPATIENT
Start: 2021-03-09 | End: 2021-03-09

## 2021-03-09 RX ORDER — LACTULOSE 20 G/30ML
30 SOLUTION ORAL 3 TIMES DAILY
Qty: 1892 ML | Refills: 2 | Status: SHIPPED | OUTPATIENT
Start: 2021-03-09

## 2021-03-09 RX ADMIN — RIFAXIMIN 550 MG: 550 TABLET ORAL at 08:37

## 2021-03-09 RX ADMIN — POLYETHYLENE GLYCOL 3350 17 G: 17 POWDER, FOR SOLUTION ORAL at 08:37

## 2021-03-09 RX ADMIN — HYDROXYZINE HYDROCHLORIDE 25 MG: 25 TABLET, FILM COATED ORAL at 09:38

## 2021-03-09 RX ADMIN — THIAMINE HCL TAB 100 MG 100 MG: 100 TAB at 08:37

## 2021-03-09 RX ADMIN — LACTULOSE 30 G: 10 SOLUTION ORAL at 08:37

## 2021-03-09 RX ADMIN — FOLIC ACID TAB 400 MCG 400 MCG: 400 TAB at 08:37

## 2021-03-09 RX ADMIN — FUROSEMIDE 60 MG: 40 TABLET ORAL at 08:37

## 2021-03-09 RX ADMIN — SPIRONOLACTONE 100 MG: 50 TABLET, FILM COATED ORAL at 08:36

## 2021-03-09 RX ADMIN — PANTOPRAZOLE SODIUM 40 MG: 40 TABLET, DELAYED RELEASE ORAL at 05:49

## 2021-03-09 RX ADMIN — POTASSIUM CHLORIDE 40 MEQ: 1500 TABLET, EXTENDED RELEASE ORAL at 10:52

## 2021-03-09 RX ADMIN — CYANOCOBALAMIN TAB 500 MCG 1000 MCG: 500 TAB at 08:37

## 2021-03-09 RX ADMIN — LORAZEPAM 0.5 MG: 0.5 TABLET ORAL at 07:39

## 2021-03-09 NOTE — UTILIZATION REVIEW
Notification of Inpatient Admission/Inpatient Authorization Request   This is a Notification of Inpatient Admission for 5 Jovany Moreauace  Be advised that this patient was admitted to our facility under Inpatient Status  Contact Kartik Verdugo at [phone] for additional admission information  [facility] DE[facility]  DEDICATED [phone]  Patient Name:   Mary Reilly   YOB: 1970       [address]   [address]:   [place] [address]  Tax ID: [id number]  NPI: [id number] Attending Provider/NPI:  Phone:  Address: Rashaun Good [[id number]]  [phone]  Same as Jay Webster 1106 of Service Code: 24 Place of Service Name:  [address] [place] [address]   Start Date: 3/7/21 1626 Discharge Date & Time: 3/9/2021 11:27 AM    Type of Admission: Inpatient Status Discharge Disposition (if discharged): [facility] SNF/TCU/SNU   Patient Diagnoses: Edema [R60 9]  Dyspnea [R06 00]  SOB (shortness of breath) [R06 02]  Cirrhosis of liver with ascites ([facility] [address] ) [K74 60, R18 8]     Orders: Admission Orders (From admission, onward)     Ordered        03/07/21 1626  Inpatient Admission  Once         03/05/21 2253  Place in Observation  Once                    Assigned Utilization Review Contact: Kartik Verdugo  Utilization ,   Network Utilization Review Department  Phone: [phone]; Fax [phone]   Email: [email or web address]   ATTENTION PAYERS: Please call the assigned Utilization  directly with any questions or concerns ALL voicemails in the department are confidential  Send all requests for admission clinical reviews, approved or denied determinations and any other requests to dedicated fax number belonging to the campus where the patient is receiving treatment        Initial Clinical Review    Admission: Date/Time/Statement:   Admission Orders (From admission, onward)     Ordered        03/07/21 1626  Inpatient Admission  Once         03/05/21 2253  Place in Observation  Once                   Orders Placed This Encounter   Procedures    Place in Observation     Standing Status:   Standing     Number of Occurrences:   1     Order Specific Question:   Level of Care     Answer:   Med Surg [16]    Inpatient Admission     Standing Status:   Standing     Number of Occurrences:   1     Order Specific Question:   Level of Care     Answer:   Med Surg [16]     Order Specific Question:   Estimated length of stay     Answer:   More than 2 Midnights     Order Specific Question:   Certification     Answer:   I certify that inpatient services are medically necessary for this patient for a duration of greater than two midnights  See H&P and MD Progress Notes for additional information about the patient's course of treatment  ED Arrival Information     Expected Arrival Acuity Means of Arrival Escorted By Service Admission Type    - 3/5/2021 18:17 Urgent Ambulance [facility] Urgent    Arrival Complaint    abd pain        Chief Complaint   Patient presents with    Shortness of Breath     patient states she started having shortness of breath last night as well as some confusion  Patient states she also has abdominal pain and swollen legs/ankles  Assessment/Plan:  52 y/o female with PMHx of decompensated alcoholic cirrhosis of the liver with jaundice, ascites, encephalopathy and h/o bleeding varices, diastolic heart failure, depression who presents from her nursing home with sob, increased abdominal distention and pain, lack of bowel movements, swollen legs and confusion with altered sleep cycles  Reports SOB started 1-2 days ago, and abd distention gradually getting worse  Also reports some confusion and sleep disturbance  Follows with GI and gets lactulose regularly at SNF  Also reports some leg swelling  Hgb 7 5, Hct 24 6, Plt 58  Ca 7 9, Alk phos 140  Ucx + e-coli  CXR with some pulm vasc congestion     Admit observation to M/S/Tele unit with alcoholic cirrhosis --  diuretics- spironolactone, furosemide, paracentesis  Consult IR for possible paracentesis  Lactulose, rifaximin- titrate lactulose to produce 3 BM's/day  Lasix IV x1, then continue home po diuretics and monitor       ED Triage Vitals [03/05/21 1831]   Temperature Pulse Respirations Blood Pressure SpO2   98 1 °F (36 7 °C) 76 18 140/67 98 %      Temp Source Heart Rate Source Patient Position - Orthostatic VS BP Location FiO2 (%)   Oral Monitor Lying Right arm --      Pain Score       5          Wt Readings from Last 1 Encounters:   03/09/21 74 9 kg (165 lb 2 oz)     Additional Vital Signs:   Date/Time  Temp  Pulse  Resp  BP  MAP (mmHg)  SpO2  O2 Device   03/06/21 0800  --  --  --  --  --  --  None (Room air)   03/06/21 07:30:16  98 7 °F (37 1 °C)  82  19  121/64  83  96 %  --   03/06/21 0300  --  --  --  --  --  --  None (Room air)   03/06/21 02:49:06  98 3 °F (36 8 °C)  72  18  131/66  88  97 %  --   03/06/21 0030  --  82  18  137/69  --  97 %  None (Room air)   03/05/21 2227  --  77  16  128/67  --  97 %  None (Room air)   03/05/21 1915  --  --  --  --  --  --  None (Room air)   03/05/21 1831  98 1 °F (36 7 °C)  76  18  140/67  107  98 %  None (Room air)       Pertinent Labs/Diagnostic Test Results:   EKG 3/5 -- NSR  CXR 3/6 -- Mild pulmonary vascular congestion    Results from last 7 days   Lab Units 03/06/21  0005   SARS-COV-2  Negative     Results from last 7 days   Lab Units 03/09/21  0519 03/08/21  0541 03/07/21  0615 03/06/21  1749 03/06/21  1003 03/06/21  0454   WBC Thousand/uL 3 50* 4 06* 3 50*  --   --  3 51*   HEMOGLOBIN g/dL 7 7* 8 0* 7 7* 8 9* 7 0* 6 5*   HEMATOCRIT % 24 5* 25 5* 24 3* 28 2* 22 4* 20 8*   PLATELETS Thousands/uL 50* 53* 51*  --   --  50*   NEUTROS ABS Thousands/µL 2 35 2 87  --   --   --  2 21     Results from last 7 days   Lab Units 03/08/21  1419   RETIC CT ABS  128,600*   RETIC CT PCT % 5 38*     Results from last 7 days   Lab Units 03/09/21  0519 03/08/21  0541 03/07/21  0615 03/06/21  0454 03/05/21 1914   SODIUM mmol/L 140 140 139 140 139   POTASSIUM mmol/L 3 6 3 8 4 1 3 4* 3 6   CHLORIDE mmol/L 107 109* 108 107 107   CO2 mmol/L 26 25 26 25 25   ANION GAP mmol/L 7 6 5 8 7   BUN mg/dL 12 14 14 14 12   CREATININE mg/dL 0 74 0 73 0 86 0 75 0 73   EGFR ml/min/1 73sq m 95 97 80 94 97   CALCIUM mg/dL 8 0* 7 9* 8 2* 7 7* 7 9*     Results from last 7 days   Lab Units 03/09/21  0519 03/08/21  0643 03/07/21  0615 03/06/21  0454 03/05/21 1914   AST U/L 27 29 26 25 32   ALT U/L 17 18 18 17 20   ALK PHOS U/L 129* 154* 126* 101 140*   TOTAL PROTEIN g/dL 5 0* 5 4* 5 3* 5 0* 5 8*   ALBUMIN g/dL 2 0* 2 2* 2 2* 2 1* 2 5*   TOTAL BILIRUBIN mg/dL 3 09* 2 60* 3 62* 4 09* 3 81*   BILIRUBIN DIRECT mg/dL  --  1 38*  --  1 73*  --    AMMONIA umol/L  --   --   --   --  47*     Results from last 7 days   Lab Units 03/09/21  0519 03/08/21  0541 03/07/21  0615 03/06/21  0454 03/05/21 1914   GLUCOSE RANDOM mg/dL 104 112 113 108 100     Results from last 7 days   Lab Units 03/05/21 1914   TROPONIN I ng/mL <0 02     Results from last 7 days   Lab Units 03/05/21 1914   PROTIME seconds 19 8*   INR  1 68*   PTT seconds 36     Results from last 7 days   Lab Units 03/06/21  0638   TSH 3RD GENERATON uIU/mL 1 370     Results from last 7 days   Lab Units 03/05/21 1914   NT-PRO BNP pg/mL 246*     Results from last 7 days   Lab Units 03/06/21  0005   INFLUENZA A PCR  Negative   INFLUENZA B PCR  Negative   RSV PCR  Negative     ED Treatment:   Medication Administration from 03/05/2021 1817 to 03/06/2021 0237       Date/Time Order Dose Route Action     03/05/2021 2257 escitalopram (LEXAPRO) tablet 10 mg 10 mg Oral Given     03/05/2021 2257 melatonin tablet 6 mg 6 mg Oral Given     03/05/2021 2256 rifaximin (XIFAXAN) tablet 550 mg 550 mg Oral Given     03/05/2021 2257 traZODone (DESYREL) tablet 50 mg 50 mg Oral Given     03/05/2021 2256 acetaminophen (TYLENOL) tablet 975 mg 975 mg Oral Given     03/06/2021 0005 potassium chloride (K-DUR,KLOR-CON) CR tablet 20 mEq 20 mEq Oral Given     03/06/2021 0005 heparin (porcine) subcutaneous injection 5,000 Units 5,000 Units Subcutaneous Given     03/06/2021 0005 hydrOXYzine HCL (ATARAX) tablet 50 mg 50 mg Oral Given     03/06/2021 0005 furosemide (LASIX) injection 40 mg 40 mg Intravenous Given     Past Medical History:   Diagnosis Date    Alcohol abuse     Cirrhosis ([facility] )     Hypertension      Present on Admission:   Alcoholic cirrhosis ([place] [address] )      Admitting Diagnosis: Edema [R60 9]  Dyspnea [R06 00]  SOB (shortness of breath) [R06 02]  Cirrhosis of liver with ascites ([place] [address] ) [K74 60, R18 8]  Age/Sex: 53 y o  female  Admission Orders:  Scheduled Medications:  cyanocobalamin, 1,000 mcg, Oral, Daily  escitalopram, 10 mg, Oral, Daily  ferrous sulfate, 325 mg, Oral, Every Other Day  folic acid, 590 mcg, Oral, Daily  furosemide, 60 mg, Oral, Daily  heparin (porcine), 5,000 Units, Subcutaneous, Q8H [address]  lactulose, 30 g, Oral, TID  melatonin, 6 mg, Oral, HS  pantoprazole, 40 mg, Oral, Early Morning  polyethylene glycol, 17 g, Oral, Daily  rifaximin, 550 mg, Oral, BID  spironolactone, 100 mg, Oral, Daily  thiamine, 100 mg, Oral, Daily  traZODone, 50 mg, Oral, HS      PRN Meds:  LORazepam, 0 5 mg, Oral, Q8H PRN  sodium chloride (PF), 3 mL, Intravenous, Q1H PRN      Network Utilization Review Department  ATTENTION: Please call with any questions or concerns to [phone] and carefully listen to the prompts so that you are directed to the right person  All voicemails are confidential   Chrystine Can all requests for admission clinical reviews, approved or denied determinations and any other requests to dedicated fax number below belonging to the campus where the patient is receiving treatment   List of dedicated fax numbers for the Facilities:  [address] [place] [address] DENIALS (Administrative/Medical Necessity) [phone]   [address] [place] [address] (Maternity/NICU/Pediatrics) 261 [facility],7Th Floor [facility] 40 [address] [facility] [address]isa [address] ([address]) [address] [facility] [address] [phone]   [address] [phone]   [place] [address] [phone]       Initial Clinical Review    Admission: Date/Time/Statement:     Observation 3-5-21 2253  Changed to inpatient 3-7-21 1626 for continued treatment of alcoholic cirrhosis    Inpatient Admission Once     Question Answer   Level of Care Med Surg   Estimated length of stay More than 2 Midnights   Certification I certify that inpatient services are medically necessary for this patient for a duration of greater than two midnights  See H&P and MD Progress Notes for additional information about the patient's course of treatment  03/07/21 1626     ED Arrival Information     Expected Arrival Acuity Means of Arrival Escorted By Service Admission Type    - 3/5/2021 18:17 Urgent Ambulance [facility] Urgent    Arrival Complaint    abd pain        Chief Complaint   Patient presents with    Shortness of Breath     patient states she started having shortness of breath last night as well as some confusion  Patient states she also has abdominal pain and swollen legs/ankles       Assessment/Plan:  54 y/o female with PMHx of decompensated alcoholic cirrhosis of the liver with jaundice, ascites, encephalopathy and h/o bleeding varices, diastolic heart failure, depression who presents from her nursing home with sob, increased abdominal distention and pain, lack of bowel movements, swollen legs and confusion with altered sleep cycles  Reports SOB started 1-2 days ago, and abd distention gradually getting worse  Also reports some confusion and sleep disturbance  Follows with GI and gets lactulose regularly at [facility]  Also reports some leg swelling  Hgb 7 5, Hct 24 6, Plt 58  Ca 7 9, Alk phos 140  Ucx + e-coli  CXR with some pulm vasc congestion  Admit observation to M/S/Tele unit with alcoholic cirrhosis --  diuretics- spironolactone, furosemide, paracentesis  Consult IR for possible paracentesis  Lactulose, rifaximin- titrate lactulose to produce 3 BM's/day  Lasix IV x1, then continue home po diuretics and monitor       ED Triage Vitals [03/05/21 1831]   Temperature Pulse Respirations Blood Pressure SpO2   98 1 °F (36 7 °C) 76 18 140/67 98 %      Temp Source Heart Rate Source Patient Position - Orthostatic VS BP Location FiO2 (%)   Oral Monitor Lying Right arm --      Pain Score       5          Wt Readings from Last 1 Encounters:   03/06/21 79 2 kg (174 lb 9 7 oz)     Additional Vital Signs:   Date/Time  Temp  Pulse  Resp  BP  MAP (mmHg)  SpO2  O2 Device   03/06/21 0800  --  --  --  --  --  --  None (Room air)   03/06/21 07:30:16  98 7 °F (37 1 °C)  82  19  121/64  83  96 %  --   03/06/21 0300  --  --  --  --  --  --  None (Room air)   03/06/21 02:49:06  98 3 °F (36 8 °C)  72  18  131/66  88  97 %  --   03/06/21 0030  --  82  18  137/69  --  97 %  None (Room air)   03/05/21 2227  --  77  16  128/67  --  97 %  None (Room air)   03/05/21 1915  --  --  --  --  --  --  None (Room air)   03/05/21 1831  98 1 °F (36 7 °C)  76  18  140/67  107  98 %  None (Room air)       Pertinent Labs/Diagnostic Test Results:   EKG 3/5 -- NSR  CXR 3/6 -- Mild pulmonary vascular congestion    Results from last 7 days   Lab Units 03/06/21  0005   SARS-COV-2  Negative     Results from last 7 days   Lab Units 03/06/21  1003 03/06/21  0454 03/06/21  0005 03/05/21  1914   WBC Thousand/uL  -- 3 51*  --  5 14   HEMOGLOBIN g/dL 7 0* 6 5*  --  7 6*   HEMATOCRIT % 22 4* 20 8*  --  24 6*   PLATELETS Thousands/uL  --  50* 55* 58*   NEUTROS ABS Thousands/µL  --  2 21  --   --        Results from last 7 days   Lab Units 03/06/21  0454 03/05/21  1914   SODIUM mmol/L 140 139   POTASSIUM mmol/L 3 4* 3 6   CHLORIDE mmol/L 107 107   CO2 mmol/L 25 25   ANION GAP mmol/L 8 7   BUN mg/dL 14 12   CREATININE mg/dL 0 75 0 73   EGFR ml/min/1 73sq m 94 97   CALCIUM mg/dL 7 7* 7 9*     Results from last 7 days   Lab Units 03/06/21  0454 03/05/21 1914   AST U/L 25 32   ALT U/L 17 20   ALK PHOS U/L 101 140*   TOTAL PROTEIN g/dL 5 0* 5 8*   ALBUMIN g/dL 2 1* 2 5*   TOTAL BILIRUBIN mg/dL 4 09* 3 81*   BILIRUBIN DIRECT mg/dL 1 73*  --    AMMONIA umol/L  --  47*     Results from last 7 days   Lab Units 03/06/21  0454 03/05/21 1914   GLUCOSE RANDOM mg/dL 108 100     Results from last 7 days   Lab Units 03/05/21 1914   TROPONIN I ng/mL <0 02     Results from last 7 days   Lab Units 03/05/21 1914   PROTIME seconds 19 8*   INR  1 68*   PTT seconds 36     Results from last 7 days   Lab Units 03/06/21  0638   TSH 3RD GENERATON uIU/mL 1 370     Results from last 7 days   Lab Units 03/05/21 1914   NT-PRO BNP pg/mL 246*     Results from last 7 days   Lab Units 03/06/21  0005   INFLUENZA A PCR  Negative   INFLUENZA B PCR  Negative   RSV PCR  Negative     ED Treatment:   Medication Administration from 03/05/2021 1817 to 03/06/2021 0237       Date/Time Order Dose Route Action     03/05/2021 2257 escitalopram (LEXAPRO) tablet 10 mg 10 mg Oral Given     03/05/2021 2257 melatonin tablet 6 mg 6 mg Oral Given     03/05/2021 2256 rifaximin (XIFAXAN) tablet 550 mg 550 mg Oral Given     03/05/2021 2257 traZODone (DESYREL) tablet 50 mg 50 mg Oral Given     03/05/2021 7116 acetaminophen (TYLENOL) tablet 975 mg 975 mg Oral Given     03/06/2021 0005 potassium chloride (K-DUR,KLOR-CON) CR tablet 20 mEq 20 mEq Oral Given     03/06/2021 0005 heparin (porcine) subcutaneous injection 5,000 Units 5,000 Units Subcutaneous Given     03/06/2021 0005 hydrOXYzine HCL (ATARAX) tablet 50 mg 50 mg Oral Given     03/06/2021 0005 furosemide (LASIX) injection 40 mg 40 mg Intravenous Given     Past Medical History:   Diagnosis Date    Alcohol abuse     [facility]rhosis ([facility] )     Hypertension      Present on Admission:   Alcoholic cirrhosis ([facility] )      Admitting Diagnosis: Edema [R60 9]  Dyspnea [R06 00]  SOB (shortness of breath) [R06 02]  Cirrhosis of liver with ascites (HCC) [K74 60, R18 8]  Age/Sex: 53 y o  female  Admission Orders:  Scheduled Medications:  cyanocobalamin, 1,000 mcg, Oral, Daily  escitalopram, 10 mg, Oral, Daily  ferrous sulfate, 325 mg, Oral, Daily With Breakfast  folic acid, 961 mcg, Oral, Daily  furosemide, 40 mg, Intravenous, Once  furosemide, 40 mg, Intravenous, Once  [START ON 3/7/2021] furosemide, 60 mg, Oral, Daily  heparin (porcine), 5,000 Units, Subcutaneous, Q8H [facility]  lactulose, 20 g, Oral, Once  lactulose, 20 g, Oral, TID  melatonin, 6 mg, Oral, HS  pantoprazole, 40 mg, Oral, Early Morning  polyethylene glycol, 17 g, Oral, Daily  rifaximin, 550 mg, Oral, BID  spironolactone, 100 mg, Oral, Daily  thiamine, 100 mg, Oral, Daily  traZODone, 50 mg, Oral, HS      PRN Meds:  LORazepam, 0 5 mg, Oral, Q8H PRN  sodium chloride (PF), 3 mL, Intravenous, Q1H PRN      Network Utilization Review Department  ATTENTION: Please call with any questions or concerns to [phone] and carefully listen to the prompts so that you are directed to the right person  All voicemails are confidential   Jordana Muss all requests for admission clinical reviews, approved or denied determinations and any other requests to dedicated fax number below belonging to the campus where the patient is receiving treatment   List of dedicated fax numbers for the Facilities:  [address] [place] [address] DENIALS (Administrative/Medical Necessity) [phone]   [address] [place] [address] (Maternity/NICU/Pediatrics) 261 [facility] 40 [address] [facility] Nisa [address] ([address]) [address] [facility] [phone]   [address] [phone]   [place] [address] [place] [address] [phone]       Initial Clinical Review    Admission: Date/Time/Statement:     Observation 3-5-21 2253  Changed to inpatient 3-7-21 1626 for continued treatment of alcoholic cirrhosis    Inpatient Admission Once     Question Answer   Level of Care Med Surg   Estimated length of stay More than 2 Midnights   Certification I certify that inpatient services are medically necessary for this patient for a duration of greater than two midnights  See H&P and MD Progress Notes for additional information about the patient's course of treatment  03/07/21 1626     ED Arrival Information     Expected Arrival Acuity Means of Arrival Escorted By Service Admission Type    - 3/5/2021 18:17 Urgent Ambulance [facility] Urgent    Arrival Complaint    abd pain        Chief Complaint   Patient presents with    Shortness of Breath     patient states she started having shortness of breath last night as well as some confusion  Patient states she also has abdominal pain and swollen legs/ankles       Assessment/Plan:  52 y/o female with PMHx of decompensated alcoholic cirrhosis of the liver with jaundice, ascites, encephalopathy and h/o bleeding varices, diastolic heart failure, depression who presents from her nursing home with sob, increased abdominal distention and pain, lack of bowel movements, swollen legs and confusion with altered sleep cycles  Reports SOB started 1-2 days ago, and abd distention gradually getting worse  Also reports some confusion and sleep disturbance  Follows with GI and gets lactulose regularly at [facility]  Also reports some leg swelling  Hgb 7 5, Hct 24 6, Plt 58  Ca 7 9, Alk phos 140  Ucx + e-coli  CXR with some pulm vasc congestion  Admit observation to M/S/Tele unit with alcoholic cirrhosis --  diuretics- spironolactone, furosemide, paracentesis  Consult IR for possible paracentesis  Lactulose, rifaximin- titrate lactulose to produce 3 BM's/day  Lasix IV x1, then continue home po diuretics and monitor       ED Triage Vitals [03/05/21 1831]   Temperature Pulse Respirations Blood Pressure SpO2   98 1 °F (36 7 °C) 76 18 140/67 98 %      Temp Source Heart Rate Source Patient Position - Orthostatic VS BP Location FiO2 (%)   Oral Monitor Lying Right arm --      Pain Score       5          Wt Readings from Last 1 Encounters:   03/06/21 79 2 kg (174 lb 9 7 oz)     Additional Vital Signs:   Date/Time  Temp  Pulse  Resp  BP  MAP (mmHg)  SpO2  O2 Device   03/06/21 0800  --  --  --  --  --  --  None (Room air)   03/06/21 07:30:16  98 7 °F (37 1 °C)  82  19  121/64  83  96 %  --   03/06/21 0300  --  --  --  --  --  --  None (Room air)   03/06/21 02:49:06  98 3 °F (36 8 °C)  72  18  131/66  88  97 %  --   03/06/21 0030  --  82  18  137/69  --  97 %  None (Room air)   03/05/21 2227  --  77  16  128/67  --  97 %  None (Room air)   03/05/21 1915  --  --  --  --  --  --  None (Room air)   03/05/21 1831  98 1 °F (36 7 °C)  76  18  140/67  107  98 %  None (Room air)       Pertinent Labs/Diagnostic Test Results:   EKG 3/5 -- NSR  CXR 3/6 -- Mild pulmonary vascular congestion    Results from last 7 days   Lab Units 03/06/21  0005   SARS-COV-2  Negative     Results from last 7 days   Lab Units 03/06/21  1003 03/06/21  0454 03/06/21  0005 03/05/21  1914   WBC Thousand/uL  -- 3 51*  --  5 14   HEMOGLOBIN g/dL 7 0* 6 5*  --  7 6*   HEMATOCRIT % 22 4* 20 8*  --  24 6*   PLATELETS Thousands/uL  --  50* 55* 58*   NEUTROS ABS Thousands/µL  --  2 21  --   --        Results from last 7 days   Lab Units 03/06/21  0454 03/05/21  1914   SODIUM mmol/L 140 139   POTASSIUM mmol/L 3 4* 3 6   CHLORIDE mmol/L 107 107   CO2 mmol/L 25 25   ANION GAP mmol/L 8 7   BUN mg/dL 14 12   CREATININE mg/dL 0 75 0 73   EGFR ml/min/1 73sq m 94 97   CALCIUM mg/dL 7 7* 7 9*     Results from last 7 days   Lab Units 03/06/21  0454 03/05/21 1914   AST U/L 25 32   ALT U/L 17 20   ALK PHOS U/L 101 140*   TOTAL PROTEIN g/dL 5 0* 5 8*   ALBUMIN g/dL 2 1* 2 5*   TOTAL BILIRUBIN mg/dL 4 09* 3 81*   BILIRUBIN DIRECT mg/dL 1 73*  --    AMMONIA umol/L  --  47*     Results from last 7 days   Lab Units 03/06/21  0454 03/05/21 1914   GLUCOSE RANDOM mg/dL 108 100     Results from last 7 days   Lab Units 03/05/21 1914   TROPONIN I ng/mL <0 02     Results from last 7 days   Lab Units 03/05/21 1914   PROTIME seconds 19 8*   INR  1 68*   PTT seconds 36     Results from last 7 days   Lab Units 03/06/21  0638   TSH 3RD GENERATON uIU/mL 1 370     Results from last 7 days   Lab Units 03/05/21 1914   NT-PRO BNP pg/mL 246*     Results from last 7 days   Lab Units 03/06/21  0005   INFLUENZA A PCR  Negative   INFLUENZA B PCR  Negative   RSV PCR  Negative     ED Treatment:   Medication Administration from 03/05/2021 1817 to 03/06/2021 0237       Date/Time Order Dose Route Action     03/05/2021 2257 escitalopram (LEXAPRO) tablet 10 mg 10 mg Oral Given     03/05/2021 2257 melatonin tablet 6 mg 6 mg Oral Given     03/05/2021 2256 rifaximin (XIFAXAN) tablet 550 mg 550 mg Oral Given     03/05/2021 2257 traZODone (DESYREL) tablet 50 mg 50 mg Oral Given     03/05/2021 4656 acetaminophen (TYLENOL) tablet 975 mg 975 mg Oral Given     03/06/2021 0005 potassium chloride (K-DUR,KLOR-CON) CR tablet 20 mEq 20 mEq Oral Given     03/06/2021 0005 heparin (porcine) subcutaneous injection 5,000 Units 5,000 Units Subcutaneous Given     03/06/2021 0005 hydrOXYzine HCL (ATARAX) tablet 50 mg 50 mg Oral Given     03/06/2021 0005 furosemide (LASIX) injection 40 mg 40 mg Intravenous Given     Past Medical History:   Diagnosis Date    Alcohol abuse     [facility]rhosis ([facility] )     Hypertension      Present on Admission:   Alcoholic cirrhosis ([facility] )      Admitting Diagnosis: Edema [R60 9]  Dyspnea [R06 00]  SOB (shortness of breath) [R06 02]  Cirrhosis of liver with ascites (HCC) [K74 60, R18 8]  Age/Sex: 53 y o  female  Admission Orders:  Scheduled Medications:  cyanocobalamin, 1,000 mcg, Oral, Daily  escitalopram, 10 mg, Oral, Daily  ferrous sulfate, 325 mg, Oral, Daily With Breakfast  folic acid, 801 mcg, Oral, Daily  furosemide, 40 mg, Intravenous, Once  furosemide, 40 mg, Intravenous, Once  [START ON 3/7/2021] furosemide, 60 mg, Oral, Daily  heparin (porcine), 5,000 Units, Subcutaneous, Q8H [address]  lactulose, 20 g, Oral, Once  lactulose, 20 g, Oral, TID  melatonin, 6 mg, Oral, HS  pantoprazole, 40 mg, Oral, Early Morning  polyethylene glycol, 17 g, Oral, Daily  rifaximin, 550 mg, Oral, BID  spironolactone, 100 mg, Oral, Daily  thiamine, 100 mg, Oral, Daily  traZODone, 50 mg, Oral, HS      PRN Meds:  LORazepam, 0 5 mg, Oral, Q8H PRN  sodium chloride (PF), 3 mL, Intravenous, Q1H PRN      Network Utilization Review Department  ATTENTION: Please call with any questions or concerns to [phone] and carefully listen to the prompts so that you are directed to the right person  All voicemails are confidential   Vincenzo Los all requests for admission clinical reviews, approved or denied determinations and any other requests to dedicated fax number below belonging to the campus where the patient is receiving treatment   List of dedicated fax numbers for the Facilities:  [address] [place] [address] DENIALS (Administrative/Medical Necessity) [phone]   [address] [place] [address] (Maternity/NICU/Pediatrics) 261 [facility],7[address] Floor [facility] [address] [place] [address]  [phone]   Jose Cain 4707 (  Wilton Ashraf "[address]) [address] [place] [address] Aren [place] [address] [phone]   [place] [address] [phone]

## 2021-03-09 NOTE — PLAN OF CARE
Problem: Potential for Falls  Goal: Patient will remain free of falls  Description: INTERVENTIONS:  - Assess patient frequently for physical needs  -  Identify cognitive and physical deficits and behaviors that affect risk of falls    -  [place] fall precautions as indicated by assessment   - Educate patient/family on patient safety including physical limitations  - Instruct patient to call for assistance with activity based on assessment  - Modify environment to reduce risk of injury  - Consider OT/PT consult to assist with strengthening/mobility  Outcome: Progressing     Problem: PAIN - ADULT  Goal: Verbalizes/displays adequate comfort level or baseline comfort level  Description: Interventions:  - Encourage patient to monitor pain and request assistance  - Assess pain using appropriate pain scale  - Administer analgesics based on type and severity of pain and evaluate response  - Implement non-pharmacological measures as appropriate and evaluate response  - Consider cultural and social influences on pain and pain management  - Notify physician/advanced practitioner if interventions unsuccessful or patient reports new pain  Outcome: Progressing     Problem: INFECTION - ADULT  Goal: Absence or prevention of progression during hospitalization  Description: INTERVENTIONS:  - Assess and monitor for signs and symptoms of infection  - Monitor lab/diagnostic results  - Monitor all insertion sites, i e  indwelling lines, tubes, and drains  - Monitor endotracheal if appropriate and nasal secretions for changes in amount and color  - [place] appropriate cooling/warming therapies per order  - Administer medications as ordered  - Instruct and encourage patient and family to use good hand hygiene technique  - Identify and instruct in appropriate isolation precautions for identified infection/condition  Outcome: Progressing  Goal: Absence of fever/infection during neutropenic period  Description: INTERVENTIONS:  - Monitor WBC    Outcome: Progressing     Problem: SAFETY ADULT  Goal: Patient will remain free of falls  Description: INTERVENTIONS:  - Assess patient frequently for physical needs  -  Identify cognitive and physical deficits and behaviors that affect risk of falls    -  [place] fall precautions as indicated by assessment   - Educate patient/family on patient safety including physical limitations  - Instruct patient to call for assistance with activity based on assessment  - Modify environment to reduce risk of injury  - Consider OT/PT consult to assist with strengthening/mobility  Outcome: Progressing  Goal: Maintain or return to baseline ADL function  Description: INTERVENTIONS:  -  Assess patient's ability to carry out ADLs; assess patient's baseline for ADL function and identify physical deficits which impact ability to perform ADLs (bathing, care of mouth/teeth, toileting, grooming, dressing, etc )  - Assess/evaluate cause of self-care deficits   - Assess range of motion  - Assess patient's mobility; develop plan if impaired  - Assess patient's need for assistive devices and provide as appropriate  - Encourage maximum independence but intervene and supervise when necessary  - Involve family in performance of ADLs  - Assess for home care needs following discharge   - Consider OT consult to assist with ADL evaluation and planning for discharge  - Provide patient education as appropriate  Outcome: Progressing  Goal: Maintain or return mobility status to optimal level  Description: INTERVENTIONS:  - Assess patient's baseline mobility status (ambulation, transfers, stairs, etc )    - Identify cognitive and physical deficits and behaviors that affect mobility  - Identify mobility aids required to assist with transfers and/or ambulation (gait belt, sit-to-stand, lift, walker, cane, etc )  - [place] fall precautions as indicated by assessment  - Record patient progress and toleration of activity level on Mobility SBAR; progress patient to next Phase/Stage  - Instruct patient to call for assistance with activity based on assessment  - Consider rehabilitation consult to assist with strengthening/weightbearing, etc   Outcome: Progressing     Problem: DISCHARGE PLANNING  Goal: Discharge to home or other facility with appropriate resources  Description: INTERVENTIONS:  - Identify barriers to discharge w/patient and caregiver  - Arrange for needed discharge resources and transportation as appropriate  - Identify discharge learning needs (meds, wound care, etc )  - Arrange for interpretive services to assist at discharge as needed  - Refer to Case Management Department for coordinating discharge planning if the patient needs post-hospital services based on physician/advanced practitioner order or complex needs related to functional status, cognitive ability, or social support system  Outcome: Progressing     Problem: Knowledge Deficit  Goal: Patient/family/caregiver demonstrates understanding of disease process, treatment plan, medications, and discharge instructions  Description: Complete learning assessment and assess knowledge base    Interventions:  - Provide teaching at level of understanding  - Provide teaching via preferred learning methods  Outcome: Progressing     Problem: RESPIRATORY - ADULT  Goal: Achieves optimal ventilation and oxygenation  Description: INTERVENTIONS:  - Assess for changes in respiratory status  - Assess for changes in mentation and behavior  - Position to facilitate oxygenation and minimize respiratory effort  - Oxygen administered by appropriate delivery if ordered  - Initiate smoking cessation education as indicated  - Encourage broncho-pulmonary hygiene including cough, deep breathe, Incentive Spirometry  - Assess the need for suctioning and aspirate as needed  - Assess and instruct to report SOB or any respiratory difficulty  - Respiratory Therapy support as indicated  Outcome: Progressing     Problem: GASTROINTESTINAL - ADULT  Goal: Minimal or absence of nausea and/or vomiting  Description: INTERVENTIONS:  - Administer IV fluids if ordered to ensure adequate hydration  - Maintain NPO status until nausea and vomiting are resolved  - Nasogastric tube if ordered  - Administer ordered antiemetic medications as needed  - Provide nonpharmacologic comfort measures as appropriate  - Advance diet as tolerated, if ordered  - Consider nutrition services referral to assist patient with adequate nutrition and appropriate food choices  Outcome: Progressing  Goal: Maintains or returns to baseline bowel function  Description: INTERVENTIONS:  - Assess bowel function  - Encourage oral fluids to ensure adequate hydration  - Administer IV fluids if ordered to ensure adequate hydration  - Administer ordered medications as needed  - Encourage mobilization and activity  - Consider nutritional services referral to assist patient with adequate nutrition and appropriate food choices  Outcome: Progressing  Goal: Maintains adequate nutritional intake  Description: INTERVENTIONS:  - Monitor percentage of each meal consumed  - Identify factors contributing to decreased intake, treat as appropriate  - Assist with meals as needed  - Monitor I&O, weight, and lab values if indicated  - Obtain nutrition services referral as needed  Outcome: Progressing     Problem: METABOLIC, FLUID AND ELECTROLYTES - ADULT  Goal: Electrolytes maintained within normal limits  Description: INTERVENTIONS:  - Monitor labs and assess patient for signs and symptoms of electrolyte imbalances  - Administer electrolyte replacement as ordered  - Monitor response to electrolyte replacements, including repeat lab results as appropriate  - Instruct patient on fluid and nutrition as appropriate  Outcome: Progressing  Goal: Fluid balance maintained  Description: INTERVENTIONS:  - Monitor labs   - Monitor I/O and WT  - Instruct patient on fluid and nutrition as appropriate  - Assess for signs & symptoms of volume excess or deficit  Outcome: Progressing  Goal: Glucose maintained within target range  Description: INTERVENTIONS:  - Monitor Blood Glucose as ordered  - Assess for signs and symptoms of hyperglycemia and hypoglycemia  - Administer ordered medications to maintain glucose within target range  - Assess nutritional intake and initiate nutrition service referral as needed  Outcome: Progressing

## 2021-03-09 NOTE — DISCHARGE INSTRUCTIONS
Cirrhosis   WHAT YOU NEED TO KNOW:   Cirrhosis is long-term scarring of the liver  The liver makes enzymes and bile that help digest food and gives your body energy  It also removes harmful material from your body, such as alcohol and other chemicals  Cirrhosis is caused by repeated damage to your liver over time  Scar tissue starts to replace healthy liver tissue  The scar tissue prevents the liver from working properly  DISCHARGE INSTRUCTIONS:   Return to the emergency department if:   · You have pain during a bowel movement and it is black or contains blood  · You have a fast heart rate and fast breathing  · You are dizzy or confused  · You have severe pain in your abdomen  · You have trouble breathing  · Your vomit looks like it has coffee grinds or blood in it  Contact your healthcare provider if:   · You have a fever  · You have red or itchy skin  · You are in pain and feel weak  · You have questions or concerns about your condition or care  Medicines: You may need medicine to treat the cause of your cirrhosis  You may also need medicine to treat any health problems caused by cirrhosis  · Antiviral medicine  may be needed if your cirrhosis is caused by hepatitis  Antiviral medicine may prevent or decrease swelling and damage to your liver  · Blood pressure medicine  is used to treat high blood pressure in the portal vein (the vein that goes to your liver)  · Diuretics  decrease extra fluid that collects in a part of your body, such as your legs and abdomen  Diuretics can also decrease your blood pressure  You will urinate more often when you take this medicine  · Antibiotics  help prevent or treat a bacterial infection  · Take your medicine as directed  Contact your healthcare provider if you think your medicine is not helping or if you have side effects  Tell him or her if you are allergic to any medicine   Keep a list of the medicines, vitamins, and herbs you take  Include the amounts, and when and why you take them  Bring the list or the pill bottles to follow-up visits  Carry your medicine list with you in case of an emergency  Do not drink alcohol:  Alcohol will cause more damage to your liver  Manage cirrhosis:   · Do not smoke  Nicotine and other chemicals in cigarettes and cigars can cause blood vessel and lung damage  Ask your healthcare provider for information if you currently smoke and need help to quit  E-cigarettes or smokeless tobacco still contain nicotine  Talk to your healthcare provider before you use these products  · Eat a variety of healthy foods  Healthy foods include fruits, vegetables, whole-grain breads, low-fat dairy products, beans, lean meat, and fish  Ask if you need to be on a special diet  · Reach or maintain a healthy weight  You may develop fatty liver disease if you are overweight  Ask your healthcare provider for a healthy weight for you  He can help you create a safe weight loss plan if you are overweight  · Limit sodium (salt)  You may need to decrease the amount of sodium you eat if you have swelling caused by fluid buildup  Sodium is found in table salt and salty foods such as canned foods, frozen foods, and potato chips  · Drink liquids as directed  Ask how much liquid to drink each day and which liquids are best for you  For most people, good liquids to drink are water, juice, and milk  Liquids can help your liver work better  · Ask about vaccines  You may have a hard time fighting infection because of cirrhosis  Vaccines help protect you against viruses that can cause diseases such as the flu or hepatitis  Viral hepatitis is caused by a virus that leads to inflammation of the liver  You may need a hepatitis A or B vaccine  You may also need a pneumonia vaccine  Always get a flu vaccine each year as soon as it becomes available  · Ask about medicines  Some medicines can harm your liver  Acetaminophen is an example  Talk to your healthcare provider about all your medicines  Do not take any over-the-counter medicine or herbal supplements until your healthcare provider says it is okay  Follow up with your healthcare provider as directed:  Write down your questions so you remember to ask them during your visits  © Copyright [address] Information is for End User's use only and may not be sold, redistributed or otherwise used for commercial purposes  All illustrations and images included in CareNotes® are the copyrighted property of A D A M , Inc  or [facility] Edinson Stringer   The above information is an  only  It is not intended as medical advice for individual conditions or treatments  Talk to your doctor, nurse or pharmacist before following any medical regimen to see if it is safe and effective for you  Abdominal Paracentesis     WHAT YOU NEED TO KNOW:   Abdominal paracentesis is a procedure to remove abnormal fluid buildup in your abdomen  Fluid builds up because of liver problems, such as swelling and scarring  Heart failure, kidney disease, a mass, or problems with your pancreas may also cause fluid buildup  DISCHARGE INSTRUCTIONS:     Follow up with your healthcare provider as directed: Write down your questions so you remember to ask them during your visits  Wound care: Remove dressing after 24 hours  Leave glue in place  Return to your normal activities    Contact Interventional Radiology at [phone] Michael PATIENTS: Contact Interventional Radiology at [phone]) Neda Dubin PATIENTS: Contact Interventional Radiology at [phone]) if:  · You have a fever and your wound is red and swollen  · You have yellow, green, or bad-smelling discharge coming from your wound  · You have pain or swelling in your abdomen  · You have an upset stomach or you vomit  · You have sudden, sharp pain in your abdomen  · You urinate very little or not at all     · You feel confused and more tired than usual    · Your arm or leg feels warm, tender, and painful  It may look swollen and red  · You suddenly feel lightheaded and have trouble breathing

## 2021-03-09 NOTE — CASE MANAGEMENT
CM was informed that pt is medically stable for dc today  CM arranged with Franki Shane for an 11am  CM notified pt, pt's bedside RN Josselyn Shaw, and Grzegorz at [facility] of dc time  Facility transfer form completed

## 2021-03-09 NOTE — DISCHARGE SUMMARY
INTERNAL MEDICINE RESIDENCY DISCHARGE SUMMARY     Korey Radford   53 y o  female  MRN: [id number]  Room[facility]Bed: [facility] [address] [place] [address]    Encounter: [id number]    Principal Problem:    Alcoholic cirrhosis ([facility] )  Active Problems:    Macrocytic anemia    Depression    Thrombocytopenia ([facility] )      * Alcoholic cirrhosis ([place] [address] )  Assessment & Plan  History of alcoholic cirrhosis  Follows with gastroenterologist, Dr Donavon Foss with [facility], as an outpatient  · Etiology: Alcohol, abstinent since 1 year   · Stage: 3 (+/-) non-bleeding varices, (+ascites)   · Compensated: No; DECOMPENSATED due to history of variceal bleeding, Ascites, Hepatic Encephalopathy and Jaundice  · Clarence Rozina Score: Class C   · MELD: 18 (has not started transplant evaluation yet)  · [place] [address]  Screening: Up to date  · Ultrasound RUQ with liver dopplers: Cirrhosis with nodular contour, ascites, evidence of portal hypertension with dilation of main portal vein, splenomegaly, gallstones without sonographic Weiss sign, and wall thickening and pericholecystic fluid possibly related to hepatic disease  · Variceal screening: Up to date; h/o variceal bleeding- small varices noted on EGD and severe portal hypertensive gastropathy- done on 11/02  · Varices intervention: Patient is not on Beta blockers because of history of bradycardia and hypotension  · Ascites intervention: reports that she had a paracentesis almost 1 5 years ago with 6L takes out    Plan:  · Ascites:  · Spirolactone 100 mg qdaily  · Furosemide 60 mg qdaily  · Hepatic Encephalopathy:  · Lactulose 30g TID; titrate to produce 3 BM/day  · Rifaximin 550 mcg BID    Shortness of breath-resolved as of 3/9/2021  Assessment & Plan  Patient present with acute onset SOB in the setting of worsening abdominal distension and b/l LE pitting edema   Patient presenting from [place] where she states she was not receiving her lactulose and has not had a BM in 2 day  Unclear etiology of SOB but differential includes anemia vs acute decompensated heart failure vs hepatopulmonary syndrome  On arrival, hemoglobin 7 6 and then dropped to 6 5  Patient now s/p 1u pRBCs  Presence of abdominal distension with liver dopplers demonstrating presence of cirrhosis, portal HTN with dilation of main portal vein, splenomegaly and gallstones  NT-proBNP 246 and Echo with an EF of 60%  CXR demonstrating pulmonary vascular congestion  Plan:  · Lasix 60 mg qdaily  · Aldactone 100 mg qdaily  · Strict ins and outs  · Daily weights  · Diet: CV 2 gram Na+ and 2000 mL fluid restriction    Macrocytic anemia  Assessment & Plan  Patient presenting with a MCV of 112  Per chart review, this appears's chronic in nature ( in 10/2019) but patient is now symptomatic and complaining of SOB  Given TSH WNL, elevated vitamin B12 of 2,699 and folate of >20 0, macrocytosis likely secondary to chronic alcoholism and liver dysfunction 2/2 cirrhosis  However given evidence of pancytopenia, could consider workup for MDS  Plan:  · Vitamin Q51 3353 mcg  · Folic Acid 221 mcg   · Follow-up reticulocyte count  · Can consider workup for MDS     Thrombocytopenia (HCC)  Assessment & Plan  Platelets of 58 occurring in the setting of cirrhosis  Patient currently stable without signs of active bleeding  Platelets 50 today      Plan:  · Continue to monitor with daily CBC  · Transfuse for platelets <16,463    Depression  Assessment & Plan  History of Depression:     Plan:  · Lexapro 10 mg daily  · Trazodone 50 mg qHS  · Melatonin 6 mg qdaily  · Ativan 0 5 mg q8 hours PRN      [address] COURSE     Per H&P completed by Dr Eric Barnett on 3/6/21, "Patient is a 53 year female -a resident of [place] - with past medical history of decompensated alcoholic cirrhosis of the liver with jaundice, ascites, encephalopathy and h/o bleeding varices, diastolic heart failure, depression who presents from her nursing home with shortness of breath, increased abdominal distention and pain, lack bowel movements, swollen legs and confusion with altered sleep cycles  She reports the shortness of breath started 1-2 days ago, does not have any positional variation in her shortness of breath, does not have a cough or hemoptysis  Abdominal distention has gradually been getting worse  She has a history of getting a paracentesis but reports that it was 1 5 years ago  She sees Dr Darryle Lloyd who is her gastroenterologist and says that she was told she is now a candidate for transplant evaluation since she has been abstinent from alcohol for almost year  She also reports having confusion and sleep disturbance, she reports medicating her lactulose regularly at [facility] and has not had a bowel movement in 2 days  She reports her likely insulin and reports feeling unwell overall she wanted to be admitted at [facility] but she was brought to the ED at [facility] Course: At time of admission, the patient did have significant abdominal distension with bilateral lower extremity swelling  NT proBNP 246 and chest x-ray demonstrating mild pulmonary vascular congestion  Patient did have a echo completed which revealed an EF of 60%, no RWA, and no valve stenosis/regurgitation  On arrival, the patient had a hemoglobin of 7 6 which later dropped to 6 5 with no signs of an overt bleed  Patient received 1 unit of pRBCs with appropriate response and stabilization of hemoglobin levels following transfusion  In addition to low hemoglobin, patient was found have evidence of pancytopenia with an MCV of 112 and an elevated reticulocyte count  at 128,600  Per chart review, patient does appear to have chronic pancytopenia and therefore was referred to Hematology/Oncology for outpatient workup      Due to significant abdominal distension, right upper quadrant liver Dopplers were completed which revealed nodular liver, ascites, portal hypertension, dilation of the main portal vein and splenomegaly along with gallstones  For management of her cirrhosis, patient was managed on Lasix 60 mg daily and Aldactone 100 mg daily  She was also continued on rifaximin 550 mg BID and lactulose 30 mg TID with appropriate number bowel movements on that regimen  On 03/08/2021, patient did undergo IR paracentesis during which 1 L fluid was removed  Patient's shortness of breath subsequently resolved and abdominal pain improved  On 03/09/2021, patient was hemodynamically stable and afebrile  Patient was discharged in stable condition to [address] [place] [address] with instructions to follow-up with her PCP within 1-2 weeks, and Gastroenterology, and Hematology/Oncology within 4 weeks  Discharge medications:  · Lactulose to 30 g TID  · Rifaximin 550 mg BID  · Lasix 60 mg daily  · Aldactone 100 mg daily    Day of Discharge:  /68   Pulse 70   Temp 98 2 °F (36 8 °C)   Resp 18   Ht 5' 4" (1 626 m)   Wt 74 9 kg (165 lb 2 oz)   LMP  (LMP Unknown)   SpO2 96%   BMI 28 34 kg/m²     Physical Exam  Constitutional:       General: She is not in acute distress  Appearance: Normal appearance  She is not ill-appearing  HENT:      Head: Normocephalic and atraumatic  Nose: Nose normal  No congestion  Mouth/Throat:      Mouth: Mucous membranes are moist       Pharynx: Oropharynx is clear  No oropharyngeal exudate  Eyes:      General: No scleral icterus  Conjunctiva/sclera: Conjunctivae normal    Neck:      Musculoskeletal: Normal range of motion  Cardiovascular:      Rate and Rhythm: Normal rate and regular rhythm  Pulses: Normal pulses  Heart sounds: Normal heart sounds  Pulmonary:      Effort: Pulmonary effort is normal       Breath sounds: No wheezing  Abdominal:      General: Bowel sounds are normal  There is distension  Palpations: Abdomen is soft  Tenderness: There is no abdominal tenderness   There is no guarding or rebound  Comments: Soft moderately distended abdomen with normoactive bowel sounds and no tenderness to palpation   Musculoskeletal:      Right lower leg: Edema present  Left lower leg: Edema present  Comments: Bilateral trace lower extremity edema   Skin:     General: Skin is warm  Capillary Refill: Capillary refill takes less than 2 seconds  Coloration: Skin is not jaundiced  Neurological:      Mental Status: She is alert and oriented to person, place, and time  Comments: No asterixis   Psychiatric:         Mood and Affect: Mood normal          Behavior: Behavior normal        DISCHARGE INFORMATION     PCP at Discharge: Lakeisha Crane    Admitting Provider: Susanne Kaye DO  Admission Date: 3/5/2021    Discharge Provider: No att  providers found  Discharge Date: 3/9/2021    Discharge Disposition: Non [facility] SNF/TCU/SNU  Discharge Condition: good  Discharge with Lines: no    Discharge Diet: Cardiovascular - 2g sodium restricted and 2000 mL fluid restricted diet  Activity Restrictions: none  Test Results Pending at Discharge: none    Discharge Diagnoses:  Principal Problem:    Alcoholic cirrhosis ([facility] )  Active Problems:    Macrocytic anemia    Depression    Thrombocytopenia ([facility] )  Resolved Problems:    Shortness of breath      Consulting Providers:      Diagnostic & Therapeutic Procedures Performed:  X-ray Chest 1 View Portable    Result Date: 3/6/2021  Impression: Mild pulmonary vascular congestion  Workstation performed: [id number]     Ir Inpatient Paracentesis    Result Date: 3/9/2021  Impression: Right upper quadrant ultrasound-guided paracentesis  Signed, performed and dictated by Amelia De La Torre PA-C under the supervision of Dr Gary Shaffer  Workstation performed: [id number]     Us Right Upper Quadrant With Liver Dopplers    Result Date: 3/7/2021  Impression: Findings are consistent with cirrhosis with a nodular contour with no definite focal lesion   Ascites Evidence of portal hypertension with dilatation of the main portal vein but hepatopedal flow  Splenomegaly Gallstones without sonographic Weiss's sign  There is wall thickening and pericholecystic fluid but this could also be related to hepatic disease and should be correlated with any acute symptomatology  The study was marked in EPIC for significant notification   Workstation performed: [id number]       Code Status: Level 3 - DNAR and DNI  Advance Directive & Living Will: <no information>  Power of :    POLST:      Medications:  Discharge Medication List as of 3/9/2021 10:53 AM      STOP taking these medications       lactulose (CEPHULAC) 20 g packet Comments:   Reason for Stopping:             Discharge Medication List as of 3/9/2021 10:53 AM      START taking these medications    Details   lactulose 20 g/30 mL Take 45 mL (30 g total) by mouth 3 (three) times a day, Starting Tue 3/9/2021, Print           Discharge Medication List as of 3/9/2021 10:53 AM      CONTINUE these medications which have NOT CHANGED    Details   acetaminophen (TYLENOL) 325 mg tablet Take 650 mg by mouth as needed for mild pain, Historical Med      bisacodyl (DULCOLAX) 10 mg suppository Insert 10 mg into the rectum as needed for constipation, Historical Med      cyanocobalamin (VITAMIN B-12) 1000 MCG tablet Take 1,000 mcg by mouth daily, Historical Med      escitalopram (LEXAPRO) 10 mg tablet Take 10 mg by mouth daily, Historical Med      ferrous sulfate 325 (65 Fe) mg tablet Take 325 mg by mouth daily with breakfast, Historical Med      folic acid (FOLVITE) 1 mg tablet Take 400 mcg by mouth daily, Historical Med      iron polysaccharides (FERREX) 150 mg capsule Take 150 mg by mouth 2 (two) times a day, Historical Med      Melatonin 5 MG TABS Take 5 mg by mouth, Historical Med      methocarbamol (ROBAXIN) 500 mg tablet Take 750 mg by mouth daily at bedtime as needed for muscle spasms , Historical Med      mirtazapine (REMERON) 30 mg tablet Take 30 mg by mouth daily at bedtime, Starting Wed 9/25/2019, Historical Med      ondansetron (ZOFRAN) 4 mg tablet Take 4 mg by mouth every 8 (eight) hours as needed for nausea or vomiting, Historical Med      pantoprazole (PROTONIX) 40 mg tablet Take 40 mg by mouth 2 (two) times a day, Historical Med      polyethylene glycol (MIRALAX) 17 g packet Take 17 g by mouth daily, Historical Med      potassium chloride (MICRO-K) 10 MEQ CR capsule Take 40 mEq by mouth 3 (three) times a day, Historical Med      rifaximin (XIFAXAN) 550 mg tablet Take 550 mg by mouth 2 (two) times a day, Historical Med      SALINE NASAL SPRAY NA into each nostril, Historical Med      thiamine 100 MG tablet Take 100 mg by mouth daily, Historical Med      traZODone (DESYREL) 50 mg tablet Take 50 mg by mouth daily at bedtime, Historical Med      triamcinolone (KENALOG) 0 5 % cream Apply topically 3 (three) times a day, Historical Med      trimethobenzamide (Tigan) 300 mg capsule Take 300 mg by mouth as needed for nausea, Historical Med      vitamin E, tocopherol, 200 units capsule Take 200 Units by mouth daily, Historical Med      zinc sulfate (ZINCATE) 220 mg capsule Take 110 mg by mouth daily, Historical Med             Allergies:  No Known Allergies    FOLLOW-UP     PCP Outpatient Follow-up:  Please follow-up with your PCP within 1-2 weeks    Consulting Providers Follow-up:  Please follow-up with Gastroenterology within 4 weeks  Please follow-up with Hematology within 4 weeks     Active Issues Requiring Follow-up:   none    Discharge Statement:   I spent 30 minutes minutes discharging the patient  This time was spent on the day of discharge  I had direct contact with the patient on the day of discharge  Additional documentation is required if more than 30 minutes were spent on discharge  Portions of the record may have been created with voice recognition software    Occasional wrong word or "sound a like" substitutions may have occurred due to the inherent limitations of voice recognition software    Read the chart carefully and recognize, using context, where substitutions have occurred     ==  Miryam Renee, [facility]  Internal Medicine Resident PGY-1

## 2021-03-10 NOTE — UTILIZATION REVIEW
Notification of Discharge  This is a Notification of Discharge from our facility [address]  Please be advised that this patient has been discharge from our facility  Below you will find the admission and discharge date and time including the patients disposition  PRESENTATION DATE: 3/5/2021  6:17 PM  OBS ADMISSION DATE:   IP ADMISSION DATE: 3/7/21 1626   DISCHARGE DATE: 3/9/2021 11:27 AM  DISPOSITION: Non [facility] SN[facility]/TCU/SNU Non [facility] SNF/TCU/SNU   Admission Orders listed below:  Admission Orders (From admission, onward)     Ordered        03/07/21 1626  Inpatient Admission  Once         03/05/21 225  Place in Observation  Once                   Please contact the UR Department if additional information is required to close this patient's authorization/case  [address] Utilization Review Department  Main: [phone] x carefully listen to the prompts  All voicemails are confidential   [email or web address]  Send all requests for admission clinical reviews, approved or denied determinations and any other requests to dedicated fax number below belonging to the campus where the patient is receiving treatment   List of dedicated fax numbers:  [address] DENIALS (Administrative/Medical Necessity) [phone]   [address] [place] [address] (Maternity/NICU/Pediatrics) [phone]   Maria Elena Arthur [phone]   [facility] [phone]   Tien Polo [phone]   [facility] [address] [place] [address] [phone]   [facility]  [phone]   [address] [facility]  [address] [phone]

## 2021-03-11 LAB
BACTERIA SPEC BFLD CULT: NO GROWTH
GRAM STN SPEC: NORMAL
GRAM STN SPEC: NORMAL

## 2021-04-28 ENCOUNTER — NURSING HOME VISIT (OUTPATIENT)
Dept: GERIATRICS | Facility: OTHER | Age: 51
End: 2021-04-28
Payer: COMMERCIAL

## 2021-04-28 DIAGNOSIS — F10.10 ALCOHOL ABUSE: ICD-10-CM

## 2021-04-28 DIAGNOSIS — F33.1 MODERATE EPISODE OF RECURRENT MAJOR DEPRESSIVE DISORDER (HCC): Primary | ICD-10-CM

## 2021-04-28 PROBLEM — G47.01 INSOMNIA DUE TO MEDICAL CONDITION: Status: ACTIVE | Noted: 2021-04-28

## 2021-04-28 PROBLEM — F41.8 OTHER SPECIFIED ANXIETY DISORDERS: Status: ACTIVE | Noted: 2021-04-28

## 2021-04-28 PROCEDURE — 99309 SBSQ NF CARE MODERATE MDM 30: CPT | Performed by: NURSE PRACTITIONER

## 2021-04-28 RX ORDER — LACTULOSE 10 G/10G
20 SOLUTION ORAL 2 TIMES DAILY
COMMUNITY
Start: 2021-02-17

## 2021-04-28 RX ORDER — ONDANSETRON 4 MG/1
4 TABLET, ORALLY DISINTEGRATING ORAL EVERY 8 HOURS PRN
COMMUNITY

## 2021-04-28 NOTE — PROGRESS NOTES
[address] [place] [address]  POS: 32: [facility], [place]     PSYCHIATRIC EVALUATION     NAME: Bella Cobos  AGE: 53 y o  SEX: female [id number]    DATE OF ENCOUNTER: 4/28/2021    Code status:  CPRDNINO TUBE FEEDING    Assessment and Plan      Diagnosis ICD-10-CM Associated Orders   1  Moderate episode of recurrent major depressive disorder (HCC)  F33 1    2  Alcohol abuse  F10 10        All medications and routine orders were reviewed and updated as needed  Evaluation of Psychotropic Drugs for possible gradual dose reductions    Psychotropic medications have been reviewed  Patient continues with symptoms of depression and anxiety as noted below  Any dose reductions at this time would be clinically contraindicated, as it would be likely to cause worsening of symptoms  Plan discussed with: Patient    Treatment Recommendations/Precautions:    Continue medications the same  Medication management every 1 month    Medications Risks/Benefits:      Risks, Benefits And Possible Side Effects Of Medications:    Risks, benefits, and possible side effects of medications explained to [facility] and she verbalizes understanding and agreement for treatment  Controlled Medication Discussion:     Not applicable - controlled prescriptions are not prescribed by this practice    Chief Complaint     Seen for Psychiatric Consult  at Nursing Facility/Assisted Living    History of Present Illness     [facility] "Rema Wiggins" is a 54 yo female with psychiatric history of alcohol abuse (none since 3/2020), depression and anxiety disorder  She was admitted to [address] [place] [address] last year after hospitalization due to a fall with SDH and sternal fracture, decompensated cirrhosis with hepatic encephalopathy and ascites  Psychiatry is consulted to establish care and evaluate for capacity for medical and financial decisions  Psychiatric services previously provided by [facility]    Currently on Lexapro 10 mg, Buspar 5 mg TID, Trazodone 100 mg HS and Melatonin 5 mg HS  She is often non compliant with diet/fluid restrictions and leaving the unit to buy snacks and drinks  She is alert and oriented x 4 and routinely scores 15/15 on Brief Interview for Mental Status (BIMS) assessed regularly here at the facility  She is able to provide fair history of events leading to admission and her improvements in the past year  She is working with  the nursing home transition program in order to secure housing and in-home services with the goal of returning to the community  She has fair understanding of the process and aware it can be a slow process  She minimizes her non compliance and states she has been doing much better with managing her ascites and other medical issues  She is currently minimizing depressive symptoms, admits to intermittent anxiety  She will be seen by Dr Randi Oleary for capacity evaluation  Anxiety  Presents for follow-up visit  Symptoms include depressed mood, excessive worry, irritability and nervous/anxious behavior  Symptoms occur occasionally  The severity of symptoms is mild  The quality of sleep is fair  Nighttime awakenings: occasional      Compliance with medications is %  Depression  This is a chronic problem  The current episode started more than 1 year ago  The problem occurs intermittently  The problem has been gradually improving  The treatment provided moderate relief         She  reports fluctuating sleep pattern, fluctuating appetite, fluctuating energy levels    The following portions of the patient's history were reviewed and updated as appropriate: allergies, current medications, past family history, past medical history, past social history, past surgical history and problem list     Past Psychiatric History:     Past Inpatient Psychiatric Treatment:   No history of past inpatient psychiatric admissions  Past Outpatient Psychiatric Treatment:    No history of past outpatient psychiatric treatment  Past Suicide Attempts: no  Past Violent Behavior: no  Past Psychiatric Medication Trials: patient does not remember    Traumatic History:     Abuse: no history of physical or sexual abuse  Other Traumatic Events: none    HISTORY:  Past Medical History:   Diagnosis Date    Alcohol abuse     Cirrhosis ([facility] [address] )     Hypertension      No family history on file    Social History     Socioeconomic History    Marital status: Single     Spouse name: Not on file    Number of children: Not on file    Years of education: Not on file    Highest education level: Not on file   Occupational History    Not on file   Social Needs    Financial resource strain: Not on file    Food insecurity     Worry: Not on file     Inability: Not on file    Transportation needs     Medical: Not on file     Non-medical: Not on file   Tobacco Use    Smoking status: Former Smoker     Types: Cigarettes     Quit date:      Years since quittin 3    Smokeless tobacco: Never Used   Substance and Sexual Activity    Alcohol use: Not Currently     Comment: currently 28 days sober    Drug use: Never    Sexual activity: Not on file   Lifestyle    Physical activity     Days per week: Not on file     Minutes per session: Not on file    Stress: Not on file   Relationships    Social connections     Talks on phone: Not on file     Gets together: Not on file     Attends Anabaptism service: Not on file     Active member of club or organization: Not on file     Attends meetings of clubs or organizations: Not on file     Relationship status: Not on file    Intimate partner violence     Fear of current or ex partner: Not on file     Emotionally abused: Not on file     Physically abused: Not on file     Forced sexual activity: Not on file   Other Topics Concern    Not on file   Social History Narrative    Not on file       Allergies:  No Known Allergies    Review of Systems     Review of Systems   Constitutional: Positive for irritability  Psychiatric/Behavioral: Positive for depression and dysphoric mood  The patient is nervous/anxious  All other systems reviewed and are negative        Medications and orders       Current Outpatient Medications:     lactulose (CEPHULAC) 10 g packet, Take 20 g by mouth 2 (two) times a day, Disp: , Rfl:     acetaminophen (TYLENOL) 325 mg tablet, Take 650 mg by mouth as needed for mild pain, Disp: , Rfl:     bisacodyl (DULCOLAX) 10 mg suppository, Insert 10 mg into the rectum as needed for constipation, Disp: , Rfl:     cyanocobalamin (VITAMIN B-12) 1000 MCG tablet, Take 1,000 mcg by mouth daily, Disp: , Rfl:     escitalopram (LEXAPRO) 10 mg tablet, Take 10 mg by mouth daily, Disp: , Rfl:     ferrous sulfate 325 (65 Fe) mg tablet, Take 325 mg by mouth daily with breakfast, Disp: , Rfl:     folic acid (FOLVITE) 1 mg tablet, Take 400 mcg by mouth daily, Disp: , Rfl:     furosemide (LASIX) 20 mg tablet, Take 3 tablets (60 mg total) by mouth daily, Disp: 30 tablet, Rfl: 2    iron polysaccharides (FERREX) 150 mg capsule, Take 150 mg by mouth 2 (two) times a day, Disp: , Rfl:     lactulose 20 g/30 mL, Take 45 mL (30 g total) by mouth 3 (three) times a day, Disp: 1892 mL, Rfl: 2    Melatonin 5 MG TABS, Take 5 mg by mouth, Disp: , Rfl:     methocarbamol (ROBAXIN) 500 mg tablet, Take 750 mg by mouth daily at bedtime as needed for muscle spasms , Disp: , Rfl:     ondansetron (ZOFRAN) 4 mg tablet, Take 4 mg by mouth every 8 (eight) hours as needed for nausea or vomiting, Disp: , Rfl:     ondansetron (ZOFRAN-ODT) 4 mg disintegrating tablet, Take 4 mg by mouth every 8 (eight) hours as needed, Disp: , Rfl:     pantoprazole (PROTONIX) 40 mg tablet, Take 40 mg by mouth 2 (two) times a day, Disp: , Rfl:     polyethylene glycol (MIRALAX) 17 g packet, Take 17 g by mouth daily, Disp: , Rfl:     potassium chloride (MICRO-K) 10 MEQ CR capsule, Take 40 mEq by mouth 3 (three) times a day, Disp: , Rfl:     rifaximin (XIFAXAN) 550 mg tablet, Take 550 mg by mouth 2 (two) times a day, Disp: , Rfl:     SALINE NASAL SPRAY NA, into each nostril, Disp: , Rfl:     spironolactone (ALDACTONE) 100 mg tablet, Take 1 tablet (100 mg total) by mouth daily, Disp: 30 tablet, Rfl: 1    thiamine 100 MG tablet, Take 100 mg by mouth daily, Disp: , Rfl:     traZODone (DESYREL) 50 mg tablet, Take 50 mg by mouth daily at bedtime, Disp: , Rfl:     triamcinolone (KENALOG) 0 5 % cream, Apply topically 3 (three) times a day, Disp: , Rfl:     trimethobenzamide (Tigan) 300 mg capsule, Take 300 mg by mouth as needed for nausea, Disp: , Rfl:     vitamin E, tocopherol, 200 units capsule, Take 200 Units by mouth daily, Disp: , Rfl:     zinc sulfate (ZINCATE) 220 mg capsule, Take 110 mg by mouth daily, Disp: , Rfl:       Objective     Mental Status Evaluation:    Appearance age appropriate, casually dressed   Behavior cooperative, calm   Speech normal rate, normal volume, normal pitch   Mood depressed   Affect flat   Thought Processes organized, goal directed   Associations intact associations   Thought Content no overt delusions   Perceptual Disturbances: no auditory hallucinations, no visual hallucinations   Abnormal Thoughts  Risk Potential Suicidal ideation - None  Homicidal ideation - None  Potential for aggression - No   Orientation oriented to person, place, time/date and situation   Memory recent and remote memory grossly intact   Consciousness alert and awake   Attention Span Concentration Span attention span and concentration are age appropriate   Intellect appears to be of average intelligence   Insight fair   Judgement limited   Muscle Strength and  Gait uses walker   Motor Activity no abnormal movements   Language no difficulty naming common objects, no difficulty repeating a phrase, no difficulty writing a sentence   Fund of Knowledge adequate knowledge of current events  adequate fund of knowledge regarding past history  adequate fund of knowledge regarding vocabulary    Pain none   Pain Scale 0       Physical Exam  Vitals signs and nursing note reviewed  Psychiatric:         Mood and Affect: Mood is anxious and depressed  Affect is flat  Pertinent Laboratory/Diagnostic Studies:   I have personally reviewed pertinent lab results  Counseling / Coordination of Care  Total floor / unit time spent today 35 minutes  Greater than 50% of total time was spent with the patient and / or family counseling and / or coordination of care       MARY Jones 04/28/21

## 2021-04-30 ENCOUNTER — NURSING HOME VISIT (OUTPATIENT)
Dept: GERIATRICS | Facility: OTHER | Age: 51
End: 2021-04-30
Payer: COMMERCIAL

## 2021-04-30 DIAGNOSIS — F32.0 CURRENT MILD EPISODE OF MAJOR DEPRESSIVE DISORDER, UNSPECIFIED WHETHER RECURRENT (HCC): ICD-10-CM

## 2021-04-30 DIAGNOSIS — K70.30 ALCOHOLIC CIRRHOSIS OF LIVER WITHOUT ASCITES (HCC): Primary | ICD-10-CM

## 2021-04-30 PROCEDURE — 99305 1ST NF CARE MODERATE MDM 35: CPT | Performed by: PSYCHIATRY & NEUROLOGY

## 2021-05-11 NOTE — PROGRESS NOTES
[address] [place] [address]  POS: 32: [facility], [place]     PSYCHIATRIC EVALUATION     NAME: Justin Mc  AGE: 53 y o  SEX: female [id number]    DATE OF ENCOUNTER: 4/30/2021    Assessment and Plan      Diagnosis ICD-10-CM Associated Orders   1  Alcoholic cirrhosis of liver without ascites (HCC)  K70 30    2  Current mild episode of major [facility] disorder, unspecified [facility]  F32 0    3  History alcohol dependence in remission    All medications and routine orders were reviewed and updated as needed  Evaluation of Psychotropic Drugs for possible gradual dose reductions    Psychotropic medications have been reviewed  Patient continues with symptoms of depression as noted below  Any dose reductions at this time would be clinically contraindicated, as it would be likely to cause worsening of symptoms      Plan discussed with: Patient    Treatment Recommendations/Precautions:      Current Outpatient Medications:     acetaminophen (TYLENOL) 325 mg tablet, Take 650 mg by mouth as needed for mild pain, Disp: , Rfl:     bisacodyl (DULCOLAX) 10 mg suppository, Insert 10 mg into the rectum as needed for constipation, Disp: , Rfl:     cyanocobalamin (VITAMIN B-12) 1000 MCG tablet, Take 1,000 mcg by mouth daily, Disp: , Rfl:     escitalopram (LEXAPRO) 10 mg tablet, Take 10 mg by mouth daily, Disp: , Rfl:     ferrous sulfate 325 (65 Fe) mg tablet, Take 325 mg by mouth daily with breakfast, Disp: , Rfl:     folic acid (FOLVITE) 1 mg tablet, Take 400 mcg by mouth daily, Disp: , Rfl:     furosemide (LASIX) 20 mg tablet, Take 3 tablets (60 mg total) by mouth daily, Disp: 30 tablet, Rfl: 2    iron polysaccharides (FERREX) 150 mg capsule, Take 150 mg by mouth 2 (two) times a day, Disp: , Rfl:     lactulose (CEPHULAC) 10 g packet, Take 20 g by mouth 2 (two) times a day, Disp: , Rfl:     lactulose 20 g/30 mL, Take 45 mL (30 g total) by mouth 3 (three) times a day, Disp: 1892 mL, Rfl: 2    Melatonin 5 MG TABS, Take 5 mg by mouth, Disp: , Rfl:     methocarbamol (ROBAXIN) 500 mg tablet, Take 750 mg by mouth daily at bedtime as needed for muscle spasms , Disp: , Rfl:     ondansetron (ZOFRAN) 4 mg tablet, Take 4 mg by mouth every 8 (eight) hours as needed for nausea or vomiting, Disp: , Rfl:     ondansetron (ZOFRAN-ODT) 4 mg disintegrating tablet, Take 4 mg by mouth every 8 (eight) hours as needed, Disp: , Rfl:     pantoprazole (PROTONIX) 40 mg tablet, Take 40 mg by mouth 2 (two) times a day, Disp: , Rfl:     polyethylene glycol (MIRALAX) 17 g packet, Take 17 g by mouth daily, Disp: , Rfl:     potassium chloride (MICRO-K) 10 MEQ CR capsule, Take 40 mEq by mouth 3 (three) times a day, Disp: , Rfl:     rifaximin (XIFAXAN) 550 mg tablet, Take 550 mg by mouth 2 (two) times a day, Disp: , Rfl:     SALINE NASAL SPRAY NA, into each nostril, Disp: , Rfl:     spironolactone (ALDACTONE) 100 mg tablet, Take 1 tablet (100 mg total) by mouth daily, Disp: 30 tablet, Rfl: 1    thiamine 100 MG tablet, Take 100 mg by mouth daily, Disp: , Rfl:     traZODone (DESYREL) 50 mg tablet, Take 50 mg by mouth daily at bedtime, Disp: , Rfl:     triamcinolone (KENALOG) 0 5 % cream, Apply topically 3 (three) times a day, Disp: , Rfl:     trimethobenzamide (Tigan) 300 mg capsule, Take 300 mg by mouth as needed for nausea, Disp: , Rfl:     vitamin E, tocopherol, 200 units capsule, Take 200 Units by mouth daily, Disp: , Rfl:     zinc sulfate (ZINCATE) 220 mg capsule, Take 110 mg by mouth daily, Disp: , Rfl:   Medication management every 4 weeks with Cathryn Purchase or as needed    Medications Risks/Benefits:      Risks, Benefits And Possible Side Effects Of Medications:    Risks, benefits, and possible side effects of medications explained to [facility] and she verbalizes understanding and agreement for treatment      Controlled Medication Discussion:     [facility] has been filling controlled prescriptions on time as prescribed according to [facility]    Chief Complaint     Seen for Psychiatric Consult  at Nursing Facility/Assisted Living    History of Present Illness     Patient was seen and examined  Chart was reviewed and case was discussed with Ingris Mahoney  Patient is a 54 yo female with psychiatric history of depression, anxiety disorder and alcohol abuse in full remission since 3/2020 who was admitted to [facility] last year after hospitalization due to a fall with SDH, sternal fracture, decompensated cirrhosis with hepatic encephalopathy and ascites  She is currently taking Lexapro 10 mg, Buspar 5 mg TID, Trazodone 100 mg HS and Melatonin 5 mg HS  Psychiatry is consulted to establish care and evaluate for capacity for medical and financial decisions  As per record, she routinely scores 15/15 on Brief Interview for Mental Status (BIMS) assessed regularly here at the facility  On exam, patient was seen coming back to her room after buying snacks  She is alert and oriented x 4 and  She is able to provide fair history of events leading to admission and her improvements in the past year  Patient states that all her family are living in [facility] bus has some friends who can contact here  She is working with  the nursing home transition program in order to secure housing and in-home services with the goal of returning to the community  She has fair understanding of the process and aware it can be a slow process  She claims she has been doing much better in managing her ascites and other medical issues and she is motivated to continue in total abstinence from alcohol use  She denies current depressive symptoms except having intermittent anxiety and limited sleep  At this time, patient does have capacity in making appropriate decisions for medical and financial care   However, if there is any additional relevant information which may affect this opinion, I reserve the right to alter this determination  The following portions of the patient's history were reviewed and updated as appropriate: allergies, current medications, past family history, past medical history, past social history, past surgical history and problem list     Past Psychiatric History:     Past Inpatient Psychiatric Treatment:   No history of past inpatient psychiatric admissions  Past Outpatient Psychiatric Treatment:    No history of past outpatient psychiatric treatment  Past Suicide Attempts: no  Past Violent Behavior: no  Past Psychiatric Medication Trials: patient does not remember    Traumatic History:     Abuse: no history of physical or sexual abuse  Other Traumatic Events: none    HISTORY:  Past Medical History:   Diagnosis Date    Alcohol abuse     [facility] [address] [facility]     Hypertension      No family history on file    Social History     Socioeconomic History    Marital status: Single     Spouse name: Not on file    Number of children: Not on file    Years of education: Not on file    Highest education level: Not on file   Occupational History    Not on file   Social Needs    Financial resource strain: Not on file    Food insecurity     Worry: Not on file     Inability: Not on file    Transportation needs     Medical: Not on file     Non-medical: Not on file   Tobacco Use    Smoking status: Former Smoker     Types: Cigarettes     Quit date:      Years since quittin 3    Smokeless tobacco: Never Used   Substance and Sexual Activity    Alcohol use: Not Currently     Comment: currently 28 days sober    Drug use: Never    Sexual activity: Not on file   Lifestyle    Physical activity     Days per week: Not on file     Minutes per session: Not on file    Stress: Not on file   Relationships    Social connections     Talks on phone: Not on file     Gets together: Not on file     Attends Synagogue service: Not on file     Active member of club or organization: Not on file     Attends meetings of clubs or organizations: Not on file     Relationship status: Not on file    Intimate partner violence     Fear of current or ex partner: Not on file     Emotionally abused: Not on file     Physically abused: Not on file     Forced sexual activity: Not on file   Other Topics Concern    Not on file   Social History Narrative    Not on file       Allergies:  No Known Allergies    Review of Systems     Review of Systems   Psychiatric/Behavioral: Positive for sleep disturbance  All other systems reviewed and are negative  Medications and orders     Liam Cabrera has a current medication list which includes the following prescription(s): acetaminophen, bisacodyl, cyanocobalamin, escitalopram, ferrous sulfate, folic acid, furosemide, iron polysaccharides, lactulose, lactulose, melatonin, methocarbamol, ondansetron, ondansetron, pantoprazole, polyethylene glycol, potassium chloride, rifaximin, saline, spironolactone, thiamine, trazodone, triamcinolone, trimethobenzamide, vitamin e (tocopherol), and zinc sulfate        Objective     Mental Status Evaluation:    Appearance age appropriate, casually dressed   Behavior cooperative, calm   Speech normal rate, normal volume, normal pitch   Mood anxious   Affect constricted   Thought Processes organized, goal directed   Associations intact associations   Thought Content no overt delusions   Perceptual Disturbances: no auditory hallucinations, no visual hallucinations   Abnormal Thoughts  Risk Potential Suicidal ideation - None  Homicidal ideation - None  Potential for aggression - No   Orientation oriented to person, place, time/date and situation   Memory recent and remote memory grossly intact   Consciousness alert and awake   Attention Span Concentration Span attention span and concentration are age appropriate   Intellect appears to be of average intelligence   Insight fair   Judgement fair   Muscle Strength and  Gait normal muscle strength and normal muscle tone, normal gait and normal balance   Motor Activity no abnormal movements   Language no difficulty naming common objects, no difficulty repeating a phrase, no difficulty writing a sentence   Fund of Knowledge adequate knowledge of current events  adequate fund of knowledge regarding past history  adequate fund of knowledge regarding vocabulary    Pain none   Pain Scale 0       Physical Exam  Vitals signs reviewed  Constitutional:       Appearance: Normal appearance  HENT:      Head: Normocephalic  Neurological:      General: No focal deficit present  Mental Status: She is alert  Psychiatric:         Attention and Perception: Attention and perception normal          Mood and Affect: Mood is anxious  Speech: Speech normal          Behavior: Behavior is cooperative  Thought Content: Thought content normal          Cognition and Memory: Cognition and memory normal          Judgment: Judgment normal        Pertinent Laboratory/Diagnostic Studies:   I have personally reviewed pertinent lab results  Counseling / Coordination of Care  Total floor / unit time spent today 35 minutes  Greater than 50% of total time was spent with the patient and / or family counseling and / or coordination of care       Wilber Rhodes MD 05/11/21

## 2023-05-02 PROBLEM — N18.9 CKD (CHRONIC KIDNEY DISEASE): Status: ACTIVE | Noted: 2023-05-02

## 2023-05-02 PROBLEM — I12.9 PARENCHYMAL RENAL HYPERTENSION: Status: ACTIVE | Noted: 2023-05-02

## 2023-05-02 NOTE — PROGRESS NOTES
Consultation - Nephrology 5/12/2023        History of Present Illness   Reason for Consult / Principal Problem: CKD    HPI: Ro Montiel is a 47y o  year old female with a history of alcoholic cirrhosis/hypertension who we are asked to see regarding CKD      She is aware that she has had some kidney dysfunction over the last couple years she believes related to liver disease  She denies any current dysuria hematuria voiding symptoms primary  She denies any kidney stones/frequent urinary tract infections/bladder problems  She denies any NSAID use  No significant major myalgias or arthralgias, or skin rash or paresthesias  Patient does have on and off edema for which she takes torsemide:/Spironolactone this seems to be controlling her fluid overload  No paracentesis since 2019 she had been on once a week  Torsemide 60 mg twice a day  Spironolactone 150 mg twice a day  Micro-K 40 mEq 2 times a day    History of hypertension for at least 2019:   Torsemide/spironolactone  Carvedilol 6 25 mg only as needed to maintain systolic blood pressure less than 110  She is monitoring her blood pressure twice a day morning and evening    No history diabetes mellitus    Pertinent labs: From 4/10/2023  UA microscopic from 4/10/2023: Negative occult blood, negative protein, positive leukocytes, microscopic 3-5 RBCs 11-20 WBCs and bacteria   Urine protein creatinine ratio: 0 17 g  Vitamin D 33/phosphorus 4 3  Hemoglobin 9 6 and platelet count 93 K  PTH intact level 35  Creatinine: 1 46 (historically 1 3-1 6)  Sodium 128/potassium 4 5 and glucose 119  Creatinine: 1 27 from 5/3/2022  Sodium now 132 with a glucose 121  Potassium 3 9  Urine protein creatinine ratio 0 09 g  UA: Negative proteinuria/negative occult blood; 0-2 RBCs/6-10 WBCs but no signs or symptoms of an infection  CBC: Hemoglobin 8 6/WBC 5 3/platelets 66 K  Ferritin 5/iron 11%  Lipid profile: LDL 59/HDL 84/triglycerides 48  TSH normal at 1 19    Review of systems: General: No recent fevers chills cough or colds, appetite is good, energy is fair, weight is very labile elated to fluids  Cardiovascular: No chest pain or shortness of breath  Respiratory: Does cough occasional sputum production not new no wheezing  Gastrointestinal: No significant nausea vomiting or diarrhea no bright low blood per rectum but did have esophageal varices    She health maintenance: Last colonoscopy 1 year ago  Genitourinary: See HPI  Neurology: No headaches, or dizziness or lightheadedness upon standing  All other systems were reviewed and are negative    Historical Information   Past Medical History:   Diagnosis Date   • Alcohol abuse    • Cirrhosis ([facility] [address] )    • Hypertension    No history of CAD/CHF/CVA/seizures/thyroid disease/diabetes mellitus/cancer    Past Surgical History:   Procedure Laterality Date   • IR PARACENTESIS  3/8/2021     Social History   Social History     Substance and Sexual Activity   Alcohol Use Not Currently    Comment: currently 28 days sober   Addendum patient is now sober 3 years  Social History     Substance and Sexual Activity   Drug Use Never     Social History     Tobacco Use   Smoking Status Former   • Types: Cigarettes   • Quit date:    • Years since quittin 3   Smokeless Tobacco Never   Tries to avoid salt  No dedicated exercise    Family history:  - Family history of kidney disease  - No history of hypertension in the family    Meds/Allergies   all current active meds have been reviewed, current meds:   Current Outpatient Medications:   •  acetaminophen (TYLENOL) 325 mg tablet, Take 650 mg by mouth as needed for mild pain, Disp: , Rfl:   •  carvedilol (COREG) 6 25 mg tablet, Take 6 25 mg by mouth as needed, Disp: , Rfl:   •  cyanocobalamin (VITAMIN B-12) 1000 MCG tablet, Take 1,000 mcg by mouth daily, Disp: , Rfl:   •  hydrOXYzine HCL (ATARAX) 50 mg tablet, Take 50 mg by mouth as needed, Disp: , Rfl:   •  lactulose (CEPHULAC) 10 g packet, Take 20 g by mouth 2 (two) times a day, Disp: , Rfl:   •  pantoprazole (PROTONIX) 40 mg tablet, Take 40 mg by mouth 2 (two) times a day, Disp: , Rfl:   •  polyethylene glycol (MIRALAX) 17 g packet, Take 17 g by mouth daily, Disp: , Rfl:   •  potassium chloride (MICRO-K) 10 MEQ CR capsule, Take 40 mEq by mouth 2 (two) times a day, Disp: , Rfl:   •  pregabalin (LYRICA) 100 mg capsule, Take 100 mg by mouth 2 (two) times a day, Disp: , Rfl:   •  rifaximin (XIFAXAN) 550 mg tablet, Take 550 mg by mouth 2 (two) times a day, Disp: , Rfl:   •  spironolactone (ALDACTONE) 100 mg tablet, Take 1 tablet (100 mg total) by mouth daily (Patient taking differently: Take 150 mg by mouth 2 (two) times a day), Disp: 30 tablet, Rfl: 1  •  torsemide (DEMADEX) 20 mg tablet, Take 60 mg by mouth 2 (two) times a day, Disp: , Rfl:   •  traZODone (DESYREL) 50 mg tablet, Take 50 mg by mouth daily at bedtime, Disp: , Rfl:   •  vitamin E, tocopherol, 200 units capsule, Take 200 Units by mouth daily, Disp: , Rfl:   •  bisacodyl (DULCOLAX) 10 mg suppository, Insert 10 mg into the rectum as needed for constipation (Patient not taking: Reported on 5/12/2023), Disp: , Rfl:   •  escitalopram (LEXAPRO) 10 mg tablet, Take 10 mg by mouth daily (Patient not taking: Reported on 5/12/2023), Disp: , Rfl:   •  ferrous sulfate 325 (65 Fe) mg tablet, Take 325 mg by mouth daily with breakfast (Patient not taking: Reported on 5/12/2023), Disp: , Rfl:   •  folic acid (FOLVITE) 1 mg tablet, Take 400 mcg by mouth daily (Patient not taking: Reported on 5/12/2023), Disp: , Rfl:   •  furosemide (LASIX) 20 mg tablet, Take 3 tablets (60 mg total) by mouth daily (Patient not taking: Reported on 5/12/2023), Disp: 30 tablet, Rfl: 2  •  iron polysaccharides (FERREX) 150 mg capsule, Take 150 mg by mouth 2 (two) times a day (Patient not taking: Reported on 5/12/2023), Disp: , Rfl:   •  lactulose 20 g/30 mL, Take 45 mL (30 g total) by mouth 3 (three) times a day (Patient not taking: Reported on 5/12/2023), Disp: 1892 mL, Rfl: 2  •  Melatonin 5 MG TABS, Take 5 mg by mouth (Patient not taking: Reported on 5/12/2023), Disp: , Rfl:   •  methocarbamol (ROBAXIN) 500 mg tablet, Take 750 mg by mouth daily at bedtime as needed for muscle spasms  (Patient not taking: Reported on 5/12/2023), Disp: , Rfl:   •  ondansetron (ZOFRAN) 4 mg tablet, Take 4 mg by mouth every 8 (eight) hours as needed for nausea or vomiting (Patient not taking: Reported on 5/12/2023), Disp: , Rfl:   •  ondansetron (ZOFRAN-ODT) 4 mg disintegrating tablet, Take 4 mg by mouth every 8 (eight) hours as needed (Patient not taking: Reported on 5/12/2023), Disp: , Rfl:   •  SALINE NASAL SPRAY NA, into each nostril (Patient not taking: Reported on 5/12/2023), Disp: , Rfl:   •  thiamine 100 MG tablet, Take 100 mg by mouth daily (Patient not taking: Reported on 5/12/2023), Disp: , Rfl:   •  triamcinolone (KENALOG) 0 5 % cream, Apply topically 3 (three) times a day (Patient not taking: Reported on 5/12/2023), Disp: , Rfl:   •  trimethobenzamide (TIGAN) 300 mg capsule, Take 300 mg by mouth as needed for nausea (Patient not taking: Reported on 5/12/2023), Disp: , Rfl:   •  zinc sulfate (ZINCATE) 220 mg capsule, Take 110 mg by mouth daily (Patient not taking: Reported on 5/12/2023), Disp: , Rfl:     No Known Allergies    Objective   BP sitting on right: 120/72 with a heart rate of 72 and regular  BP sitting on left: 122/72 with a heart rate of 72 and regular  BP standing on left: 116/70 with a heart rate of 80 and regular  Body mass index is 26 54 kg/m²      General:  Well-developed well-nourished no acute distress  Skin:  No acute rash  Eyes:  No scleral icterus, noninjected, conjunctiva appear normal, no discharge from the eyes  ENT:  Normocephalic/atraumatic, mucous membranes moist, tongue appears normal size  Neck:  Supple, no jugular venous distention, 1+ carotid upstroke, no carotid (submandibular fat pad) bruits; trachea is midline, no thyromegaly; no lymphadenopathy  Back:  No CVA tenderness, spine appears midline without any overt abnormalities  Chest:  Clear to auscultation and percussion, good respiratory effort, no use of accessory respiratory muscles  CVS:  Regular rate and rhythm without a murmur rub or gallops appreciable  Abdomen/gastrointestinal:  Normal bowel sounds, nontender and mildyl distended without hepatosplenomegaly or bruits; no masses appreciable (no apparent fluid wave?)  Extremities:  No clubbing, no cyanosis, no edema, 1+ dorsalis pedis pulses, no femoral bruits, no arthritic changes and full range of motion  Neuro:  No gross focality  Psych:  Alert, oriented and appropriate    Current Weight:   Weight (last 2 days)     Date/Time Weight    05/12/23 1510 70 1 (154 6)            Lab Results:  I have personally reviewed pertinent labs  Results for orders placed or performed in visit on 05/12/23   POCT urine dip   Result Value Ref Range    LEUKOCYTE ESTERASE,UA neg     NITRITE,UA neg     SL AMB POCT UROBILINOGEN 0 2     POCT URINE PROTEIN neg      PH,UA 6 5     BLOOD,UA neg     SPECIFIC GRAVITY,UA 1 010     KETONES,UA neg     BILIRUBIN,UA neg     GLUCOSE, UA neg      COLOR,UA yellow     CLARITY,UA clear                ASSESSMENT AND PLAN:  47y o  year old female with a history of alcoholic cirrhosis/hypertension who we are asked to see regarding CKD    1  CKD stage 3B  Etiology: Possible chronic hepatorenal syndrome from cirrhosis/hypertensive nephrosclerosis  No evidence of obstructive uropathy    Also very importantly related to high-dose diuretics to maintain euvolemia  Baseline creatinine: 1 3-1 6; most recent 1 27 from 5/3/2023  Recommend:  Workup:  Follow-up chemistry  UA with microscopic: Please see above +6-10 WBCs no RBCs no proteinuria  Urine protein creatinine ratio: Please see above: 0 09 g  Mineral bone disorder evaluation from CKD including magnesium/phosphorus/PTH intact level/vitamin-D level  CBC: Please see above chronic thrombocytopenia and anemia most compatible with cirrhosis: Low iron studies please see above potential treatment with iron patient had constipation with oral iron I will give her an iron infusion  Lipid profile: Please see above goal  Renal imaging: From 4/3/2023 demonstrated no obstructive uropathy nonobstructing renal calculi      Treatment:  Treat hypertension, please see below for recommendations  Treat dyslipidemia  Avoid nephrotoxic agents such as NSAIDs, and proton pump inhibitors as able; patient counseled as such  Good overall health recommendations including weight loss as appropriate, isotonic exercise as able, and avoidance of salt; patient counseled as such  The patient has been doing quite well on current diuretic regimen as outlined I would not make any changes  Follow-up closely with gastroenterology  We will continue to monitor labs closely along with GI    Further workup and treatment recommendations will be forthcoming depending upon the above results and course    2  Hypertension:  Goal:  Less than 130/80 given CKD  Push nonmedical regimen as outlined above  Medication changes today:  No changes patient has a defined regimen she will just take a week of blood pressure readings  3   Hyponatremia: Most likely related to cirrhosis with increased ADH: We will perform simple work-up apply modest fluid restriction potential use for loop diuretics  Work-up: Urine sodium/urine osmolality/serum osmolality/a m  cortisol/uric acid  CT scan without overt pathology except for cirrhosis and splenomegaly  Chest x-ray from April also unremarkable  And thyroid profile normal   Treatment: Modest fluid restriction 1500 mL/day potential use for loop diuretic depending upon results and volume status      4  Other problems:  Alcoholic cirrhosis evaluated by transplant at the [address] [place] [address] Drive followed closely by Dr Florentin Kelly from GI  Hypertension      Patient Instructions   1   Medication changes today:  No medication changes today  He is try to stick with a 1500 mL or 48-64 ounce fluid restriction at this time  Continue to adhere to a very low sodium diet this will help prevent fluid collection    We will also set you up for iron infusion at this time    2  Please go for non fasting  lab work over the next 1 to 2 weeks but please do this in the morning, these will include some urine studies and also a stool check to make sure no bleeding  3   Please take 1 week a blood pressure readings at your convenience at this time    AS FOLLOWS  MORNING AND EVENING, SITTING as follows:  TAKE THE MORNING READINGS BEFORE ANY MEDICATIONS AND WHEN YOU ARE RELAXED FOR SEVERAL MINUTES  TAKE THE EVENING READINGS:  BETWEEN 7-10 P M ; PRIOR TO ANY MEDICATIONS; AT LEAST IN OUR  FROM DINNER; AND CERTAINLY AFTER RELAXING FOR A FEW MINUTES  PLEASE INCLUDE HEART RATE WITH YOUR BLOOD PRESSURE READINGS  When taking standing readings, keep your arm supported at heart level and not dangling  Make sure you are sitting with your back supported and feet on the ground and do not cross your legs or feet  Make sure you have not taken any coffee or caffeine products or exercised or smoke cigarettes at least 30 minutes before taking your blood pressure  Then please mail these readings into the office    4  In 3 months:  Please go for nonfasting lab work but in the morning  Please take 1 week a blood pressure readings as outlined above and mail those into the office      5  Follow-up in 6 months  Please bring in 1 week a blood pressure readings morning evening, sitting and standing is outlined above  PLEASE BRING AN YOUR BLOOD PRESSURE MACHINE TO CORRELATE WITH THE OFFICE MACHINE AT THIS NEXT SCHEDULED VISIT  Please go for fasting lab work 1-2 weeks prior to your appointment      6    General instructions:  AVOID SALT BUT NOT ADDING AN READING LABELS TO MAKE SURE THERE IS LOW-SALT IN THE FOOD THAT YOU ARE EATING  Goal is "less than 2 g of sodium intake or less than 5 g of sodium chloride intake per day    Avoid nonsteroidal anti-inflammatory drugs such as Naprosyn, ibuprofen, Aleve, Advil, Celebrex, Meloxicam (Mobic) etc   You can use Tylenol as needed if you do not have any liver condition to be concerned about    Avoid medications such as Sudafed or decongestants and antihistamines that contained the D component which is the decongestant  You can take antihistamines without the decongestant or D component  Try to avoid medications such as pantoprazole or  Protonix/Nexium or Esomeprazole)/Prilosec or omeprazole/Prevacid or lansoprazole/AcipHex or Rabeprazole  If you are able to, use Pepcid as this is safer for your kidneys  Try to exercise at least 30 minutes 3 days a week to begin with with an ultimate goal of 5 days a week for at least 30 minutes    Try to lose 5-10 lb by your next visit    Please do not drink more than 2 glasses of alcohol/wine on a daily basis as this may contribute to your high blood pressure  Portions of the record may have been created with voice recognition software  Occasional wrong word or \"sound a like\" substitutions may have occurred due to the inherent limitations of voice recognition software  Read the chart carefully and recognize, using context, where substitutions have occurred      Kelli Adhikari MD  "

## 2023-05-12 ENCOUNTER — CONSULT (OUTPATIENT)
Dept: NEPHROLOGY | Facility: CLINIC | Age: 53
End: 2023-05-12

## 2023-05-12 VITALS — WEIGHT: 154.6 LBS | HEIGHT: 64 IN | BODY MASS INDEX: 26.4 KG/M2

## 2023-05-12 DIAGNOSIS — D63.1 ANEMIA IN STAGE 3B CHRONIC KIDNEY DISEASE (HCC): ICD-10-CM

## 2023-05-12 DIAGNOSIS — D50.8 OTHER IRON DEFICIENCY ANEMIA: ICD-10-CM

## 2023-05-12 DIAGNOSIS — N18.32 ANEMIA IN STAGE 3B CHRONIC KIDNEY DISEASE (HCC): ICD-10-CM

## 2023-05-12 DIAGNOSIS — N18.32 STAGE 3B CHRONIC KIDNEY DISEASE (HCC): ICD-10-CM

## 2023-05-12 DIAGNOSIS — I12.9 PARENCHYMAL RENAL HYPERTENSION, STAGE 1 THROUGH STAGE 4 OR UNSPECIFIED CHRONIC KIDNEY DISEASE: Primary | ICD-10-CM

## 2023-05-12 DIAGNOSIS — K70.30 ALCOHOLIC CIRRHOSIS (HCC): ICD-10-CM

## 2023-05-12 PROBLEM — D50.9 IRON DEFICIENCY ANEMIA: Status: ACTIVE | Noted: 2023-05-12

## 2023-05-12 PROBLEM — N18.9 ANEMIA IN CHRONIC KIDNEY DISEASE (CKD): Status: ACTIVE | Noted: 2023-05-12

## 2023-05-12 LAB
SL AMB  POCT GLUCOSE, UA: NORMAL
SL AMB LEUKOCYTE ESTERASE,UA: NORMAL
SL AMB POCT BILIRUBIN,UA: NORMAL
SL AMB POCT BLOOD,UA: NORMAL
SL AMB POCT CLARITY,UA: CLEAR
SL AMB POCT COLOR,UA: YELLOW
SL AMB POCT KETONES,UA: NORMAL
SL AMB POCT NITRITE,UA: NORMAL
SL AMB POCT PH,UA: 6.5
SL AMB POCT SPECIFIC GRAVITY,UA: 1.01
SL AMB POCT URINE PROTEIN: NORMAL
SL AMB POCT UROBILINOGEN: 0.2

## 2023-05-12 RX ORDER — CARVEDILOL 6.25 MG/1
6.25 TABLET ORAL AS NEEDED
COMMUNITY
Start: 2023-04-17

## 2023-05-12 RX ORDER — TORSEMIDE 20 MG/1
60 TABLET ORAL 2 TIMES DAILY
COMMUNITY
Start: 2023-04-22

## 2023-05-12 RX ORDER — PREGABALIN 100 MG/1
100 CAPSULE ORAL 2 TIMES DAILY
COMMUNITY
Start: 2023-03-15

## 2023-05-12 RX ORDER — HYDROXYZINE 50 MG/1
50 TABLET, FILM COATED ORAL AS NEEDED
COMMUNITY
Start: 2023-05-08

## 2023-05-12 RX ORDER — SODIUM CHLORIDE 9 MG/ML
20 INJECTION, SOLUTION INTRAVENOUS ONCE
OUTPATIENT
Start: 2023-05-15

## 2023-05-12 NOTE — LETTER
May 12, 2023     Judith Hill MD  [address] [place] [id number]    Patient: Akhil De La Torre   YOB: 1970   Date of Visit: 5/12/2023       Dear Dr Rolf Hernandez: Thank you for referring Akhil De La Torre to me for evaluation  Below are my notes for this consultation  If you have questions, please do not hesitate to call me  I look forward to following your patient along with you  Sincerely,        Sukhwinder Vigil MD        CC: She MD Rc Payne MD Udell Caro, MD  5/12/2023  4:20 PM  Sign when Signing Visit  Consultation - Nephrology 5/12/2023        History of Present Illness   Reason for Consult / Principal Problem: CKD    HPI: Akhil De La Torre is a 47y o  year old female with a history of alcoholic cirrhosis/hypertension who we are asked to see regarding CKD      She is aware that she has had some kidney dysfunction over the last couple years she believes related to liver disease  She denies any current dysuria hematuria voiding symptoms primary  She denies any kidney stones/frequent urinary tract infections/bladder problems  She denies any NSAID use  No significant major myalgias or arthralgias, or skin rash or paresthesias  Patient does have on and off edema for which she takes torsemide:/Spironolactone this seems to be controlling her fluid overload    No paracentesis since 2019 she had been on once a week  · Torsemide 60 mg twice a day  · Spironolactone 150 mg twice a day  · Micro-K 40 mEq 2 times a day    · History of hypertension for at least 2019:  · Torsemide/spironolactone  · Carvedilol 6 25 mg only as needed to maintain systolic blood pressure less than 110  She is monitoring her blood pressure twice a day morning and evening    · No history diabetes mellitus    Pertinent labs: From 4/10/2023  · UA microscopic from 4/10/2023: Negative occult blood, negative protein, positive leukocytes, microscopic 3-5 RBCs 11-20 WBCs and bacteria   · Urine protein creatinine ratio: 0 17 g  · Vitamin D 33/phosphorus 4 3  · Hemoglobin 9 6 and platelet count 93 K  · PTH intact level 35  · Creatinine: 1 46 (historically 1 3-1 6)  · Sodium 128/potassium 4 5 and glucose 119  · Creatinine: 1 27 from 5/3/2022  · Sodium now 132 with a glucose 121  · Potassium 3 9  · Urine protein creatinine ratio 0 09 g  · UA: Negative proteinuria/negative occult blood; 0-2 RBCs/6-10 WBCs but no signs or symptoms of an infection  · CBC: Hemoglobin 8 6/WBC 5 3/platelets 66 K  · Ferritin 5/iron 11%  · Lipid profile: LDL 59/HDL 84/triglycerides 48  · TSH normal at 1 19    Review of systems:    General: No recent fevers chills cough or colds, appetite is good, energy is fair, weight is very labile elated to fluids  Cardiovascular: No chest pain or shortness of breath  Respiratory: Does cough occasional sputum production not new no wheezing  Gastrointestinal: No significant nausea vomiting or diarrhea no bright low blood per rectum but did have esophageal varices    She health maintenance: Last colonoscopy 1 year ago  Genitourinary: See HPI  Neurology: No headaches, or dizziness or lightheadedness upon standing  All other systems were reviewed and are negative    Historical Information   Past Medical History:   Diagnosis Date   • Alcohol abuse    • Cirrhosis ([facility] [address] )    • Hypertension    No history of CAD/CHF/CVA/seizures/thyroid disease/diabetes mellitus/cancer    Past Surgical History:   Procedure Laterality Date   • IR PARACENTESIS  3/8/2021     Social History   Social History     Substance and Sexual Activity   Alcohol Use Not Currently    Comment: currently 28 days sober   Addendum patient is now sober 3 years  Social History     Substance and Sexual Activity   Drug Use Never     Social History     Tobacco Use   Smoking Status Former   • Types: Cigarettes   • Quit date:    • Years since quittin 3   Smokeless Tobacco Never   Tries to avoid salt  · No dedicated exercise    Family history:  - Family history of kidney disease  - No history of hypertension in the family    Meds/Allergies    all current active meds have been reviewed, current meds:   Current Outpatient Medications:   •  acetaminophen (TYLENOL) 325 mg tablet, Take 650 mg by mouth as needed for mild pain, Disp: , Rfl:   •  carvedilol (COREG) 6 25 mg tablet, Take 6 25 mg by mouth as needed, Disp: , Rfl:   •  cyanocobalamin (VITAMIN B-12) 1000 MCG tablet, Take 1,000 mcg by mouth daily, Disp: , Rfl:   •  hydrOXYzine HCL (ATARAX) 50 mg tablet, Take 50 mg by mouth as needed, Disp: , Rfl:   •  lactulose (CEPHULAC) 10 g packet, Take 20 g by mouth 2 (two) times a day, Disp: , Rfl:   •  pantoprazole (PROTONIX) 40 mg tablet, Take 40 mg by mouth 2 (two) times a day, Disp: , Rfl:   •  polyethylene glycol (MIRALAX) 17 g packet, Take 17 g by mouth daily, Disp: , Rfl:   •  potassium chloride (MICRO-K) 10 MEQ CR capsule, Take 40 mEq by mouth 2 (two) times a day, Disp: , Rfl:   •  pregabalin (LYRICA) 100 mg capsule, Take 100 mg by mouth 2 (two) times a day, Disp: , Rfl:   •  rifaximin (XIFAXAN) 550 mg tablet, Take 550 mg by mouth 2 (two) times a day, Disp: , Rfl:   •  spironolactone (ALDACTONE) 100 mg tablet, Take 1 tablet (100 mg total) by mouth daily (Patient taking differently: Take 150 mg by mouth 2 (two) times a day), Disp: 30 tablet, Rfl: 1  •  torsemide (DEMADEX) 20 mg tablet, Take 60 mg by mouth 2 (two) times a day, Disp: , Rfl:   •  traZODone (DESYREL) 50 mg tablet, Take 50 mg by mouth daily at bedtime, Disp: , Rfl:   •  vitamin E, tocopherol, 200 units capsule, Take 200 Units by mouth daily, Disp: , Rfl:   •  bisacodyl (DULCOLAX) 10 mg suppository, Insert 10 mg into the rectum as needed for constipation (Patient not taking: Reported on 5/12/2023), Disp: , Rfl:   •  escitalopram (LEXAPRO) 10 mg tablet, Take 10 mg by mouth daily (Patient not taking: Reported on 5/12/2023), Disp: , Rfl:   •  ferrous sulfate 325 (65 Fe) mg tablet, Take 325 mg by mouth daily with breakfast (Patient not taking: Reported on 5/12/2023), Disp: , Rfl:   •  folic acid (FOLVITE) 1 mg tablet, Take 400 mcg by mouth daily (Patient not taking: Reported on 5/12/2023), Disp: , Rfl:   •  furosemide (LASIX) 20 mg tablet, Take 3 tablets (60 mg total) by mouth daily (Patient not taking: Reported on 5/12/2023), Disp: 30 tablet, Rfl: 2  •  iron polysaccharides (FERREX) 150 mg capsule, Take 150 mg by mouth 2 (two) times a day (Patient not taking: Reported on 5/12/2023), Disp: , Rfl:   •  lactulose 20 g/30 mL, Take 45 mL (30 g total) by mouth 3 (three) times a day (Patient not taking: Reported on 5/12/2023), Disp: 1892 mL, Rfl: 2  •  Melatonin 5 MG TABS, Take 5 mg by mouth (Patient not taking: Reported on 5/12/2023), Disp: , Rfl:   •  methocarbamol (ROBAXIN) 500 mg tablet, Take 750 mg by mouth daily at bedtime as needed for muscle spasms  (Patient not taking: Reported on 5/12/2023), Disp: , Rfl:   •  ondansetron (ZOFRAN) 4 mg tablet, Take 4 mg by mouth every 8 (eight) hours as needed for nausea or vomiting (Patient not taking: Reported on 5/12/2023), Disp: , Rfl:   •  ondansetron (ZOFRAN-ODT) 4 mg disintegrating tablet, Take 4 mg by mouth every 8 (eight) hours as needed (Patient not taking: Reported on 5/12/2023), Disp: , Rfl:   •  SALINE NASAL SPRAY NA, into each nostril (Patient not taking: Reported on 5/12/2023), Disp: , Rfl:   •  thiamine 100 MG tablet, Take 100 mg by mouth daily (Patient not taking: Reported on 5/12/2023), Disp: , Rfl:   •  triamcinolone (KENALOG) 0 5 % cream, Apply topically 3 (three) times a day (Patient not taking: Reported on 5/12/2023), Disp: , Rfl:   •  trimethobenzamide (TIGAN) 300 mg capsule, Take 300 mg by mouth as needed for nausea (Patient not taking: Reported on 5/12/2023), Disp: , Rfl:   •  zinc sulfate (ZINCATE) 220 mg capsule, Take 110 mg by mouth daily (Patient not taking: Reported on 5/12/2023), Disp: , Rfl:     No Known Allergies    Objective    BP sitting on right: 120/72 with a heart rate of 72 and regular  BP sitting on left: 122/72 with a heart rate of 72 and regular  BP standing on left: 116/70 with a heart rate of 80 and regular  Body mass index is 26 54 kg/m²  General:  Well-developed well-nourished no acute distress  Skin:  No acute rash  Eyes:  No scleral icterus, noninjected, conjunctiva appear normal, no discharge from the eyes  ENT:  Normocephalic/atraumatic, mucous membranes moist, tongue appears normal size  Neck:  Supple, no jugular venous distention, 1+ carotid upstroke, no carotid (submandibular fat pad) bruits; trachea is midline, no thyromegaly; no lymphadenopathy  Back:  No CVA tenderness, spine appears midline without any overt abnormalities  Chest:  Clear to auscultation and percussion, good respiratory effort, no use of accessory respiratory muscles  CVS:  Regular rate and rhythm without a murmur rub or gallops appreciable  Abdomen/gastrointestinal:  Normal bowel sounds, nontender and mildyl distended without hepatosplenomegaly or bruits; no masses appreciable (no apparent fluid wave?)  Extremities:  No clubbing, no cyanosis, no edema, 1+ dorsalis pedis pulses, no femoral bruits, no arthritic changes and full range of motion  Neuro:  No gross focality  Psych:  Alert, oriented and appropriate    Current Weight:   Weight (last 2 days)     Date/Time Weight    05/12/23 1510 70 1 (154 6)            Lab Results:  I have personally reviewed pertinent labs    Results for orders placed or performed in visit on 05/12/23   POCT urine dip   Result Value Ref Range    LEUKOCYTE ESTERASE,UA neg     NITRITE,UA neg     SL AMB POCT UROBILINOGEN 0 2     POCT URINE PROTEIN neg      PH,UA 6 5     BLOOD,UA neg     SPECIFIC GRAVITY,UA 1 010     KETONES,UA neg     BILIRUBIN,UA neg     GLUCOSE, UA neg      COLOR,UA yellow     CLARITY,UA clear                ASSESSMENT AND PLAN:  47y o  year old female with a history of alcoholic cirrhosis/hypertension who we are asked to see regarding CKD    1  CKD stage 3B  • Etiology: Possible chronic hepatorenal syndrome from cirrhosis/hypertensive nephrosclerosis  No evidence of obstructive uropathy  Also very importantly related to high-dose diuretics to maintain euvolemia  • Baseline creatinine: 1 3-1 6; most recent 1 27 from 5/3/2023  Recommend:  • Workup:  o Follow-up chemistry  o UA with microscopic: Please see above +6-10 WBCs no RBCs no proteinuria  o Urine protein creatinine ratio: Please see above: 0 09 g  o Mineral bone disorder evaluation from CKD including magnesium/phosphorus/PTH intact level/vitamin-D level  o CBC: Please see above chronic thrombocytopenia and anemia most compatible with cirrhosis: Low iron studies please see above potential treatment with iron patient had constipation with oral iron I will give her an iron infusion  o Lipid profile: Please see above goal  o Renal imaging: From 4/3/2023 demonstrated no obstructive uropathy nonobstructing renal calculi      • Treatment:  o Treat hypertension, please see below for recommendations  o Treat dyslipidemia  o Avoid nephrotoxic agents such as NSAIDs, and proton pump inhibitors as able; patient counseled as such  o Good overall health recommendations including weight loss as appropriate, isotonic exercise as able, and avoidance of salt; patient counseled as such  o The patient has been doing quite well on current diuretic regimen as outlined I would not make any changes  Follow-up closely with gastroenterology  o We will continue to monitor labs closely along with GI    Further workup and treatment recommendations will be forthcoming depending upon the above results and course    2  Hypertension:  • Goal:  Less than 130/80 given CKD  • Push nonmedical regimen as outlined above  • Medication changes today:  • No changes patient has a defined regimen she will just take a week of blood pressure readings          3   Hyponatremia: Most likely related to cirrhosis with increased ADH: We will perform simple work-up apply modest fluid restriction potential use for loop diuretics  · Work-up: Urine sodium/urine osmolality/serum osmolality/a m  cortisol/uric acid  CT scan without overt pathology except for cirrhosis and splenomegaly  Chest x-ray from April also unremarkable  And thyroid profile normal   · Treatment: Modest fluid restriction 1500 mL/day potential use for loop diuretic depending upon results and volume status      4  Other problems:  • Alcoholic cirrhosis evaluated by transplant at the [address] [place] [address] Drive followed closely by Dr Dorina Russell from GI  • Hypertension      Patient Instructions   1  Medication changes today:  • No medication changes today  • He is try to stick with a 1500 mL or 48-64 ounce fluid restriction at this time  • Continue to adhere to a very low sodium diet this will help prevent fluid collection    We will also set you up for iron infusion at this time    2  Please go for non fasting  lab work over the next 1 to 2 weeks but please do this in the morning, these will include some urine studies and also a stool check to make sure no bleeding      3   Please take 1 week a blood pressure readings at your convenience at this time    AS FOLLOWS  MORNING AND EVENING, SITTING as follows:  · TAKE THE MORNING READINGS BEFORE ANY MEDICATIONS AND WHEN YOU ARE RELAXED FOR SEVERAL MINUTES  · TAKE THE EVENING READINGS:  BETWEEN 7-10 P M ; PRIOR TO ANY MEDICATIONS; AT LEAST IN OUR  FROM DINNER; AND CERTAINLY AFTER RELAXING FOR A FEW MINUTES  · PLEASE INCLUDE HEART RATE WITH YOUR BLOOD PRESSURE READINGS  · When taking standing readings, keep your arm supported at heart level and not dangling  · Make sure you are sitting with your back supported and feet on the ground and do not cross your legs or feet  · Make sure you have not taken any coffee or caffeine products or exercised or smoke cigarettes at least 30 minutes before taking your blood "pressure  Then please mail these readings into the office    4  In 3 months:  • Please go for nonfasting lab work but in the morning  • Please take 1 week a blood pressure readings as outlined above and mail those into the office      5  Follow-up in 6 months  • Please bring in 1 week a blood pressure readings morning evening, sitting and standing is outlined above  • PLEASE BRING AN YOUR BLOOD PRESSURE MACHINE TO CORRELATE WITH THE OFFICE MACHINE AT THIS NEXT SCHEDULED VISIT  • Please go for fasting lab work 1-2 weeks prior to your appointment      6  General instructions:  • AVOID SALT BUT NOT ADDING AN READING LABELS TO MAKE SURE THERE IS LOW-SALT IN THE FOOD THAT YOU ARE EATING  o Goal is less than 2 g of sodium intake or less than 5 g of sodium chloride intake per day    • Avoid nonsteroidal anti-inflammatory drugs such as Naprosyn, ibuprofen, Aleve, Advil, Celebrex, Meloxicam (Mobic) etc   You can use Tylenol as needed if you do not have any liver condition to be concerned about    • Avoid medications such as Sudafed or decongestants and antihistamines that contained the D component which is the decongestant  You can take antihistamines without the decongestant or D component  • Try to avoid medications such as pantoprazole or  Protonix/Nexium or Esomeprazole)/Prilosec or omeprazole/Prevacid or lansoprazole/AcipHex or Rabeprazole  If you are able to, use Pepcid as this is safer for your kidneys  • Try to exercise at least 30 minutes 3 days a week to begin with with an ultimate goal of 5 days a week for at least 30 minutes    • Try to lose 5-10 lb by your next visit    • Please do not drink more than 2 glasses of alcohol/wine on a daily basis as this may contribute to your high blood pressure  Portions of the record may have been created with voice recognition software    Occasional wrong word or \"sound a like\" substitutions may have occurred due to the inherent limitations of voice " recognition software  Read the chart carefully and recognize, using context, where substitutions have occurred      Jose Antonio Jama MD

## 2023-05-12 NOTE — LETTER
May 12, 2023     Yann Rick MD  [address] [place] [id number]    Patient: Bong Bolivar   YOB: 1970   Date of Visit: 5/12/2023       Dear Dr Aleksandr Cope: Thank you for referring Bong Bolivar to me for evaluation  Below are my notes for this consultation  If you have questions, please do not hesitate to call me  I look forward to following your patient along with you  Sincerely,        Pura Palomino MD        CC: MD Pura Lim MD  5/12/2023  4:17 PM  Sign when Signing Visit  Consultation - Nephrology 5/12/2023        History of Present Illness   Reason for Consult / Principal Problem: CKD    HPI: Bong Bolivar is a 47y o  year old female with a history of alcoholic cirrhosis/hypertension who we are asked to see regarding CKD      She is aware that she has had some kidney dysfunction over the last couple years she believes related to liver disease  She denies any current dysuria hematuria voiding symptoms primary  She denies any kidney stones/frequent urinary tract infections/bladder problems  She denies any NSAID use  No significant major myalgias or arthralgias, or skin rash or paresthesias  Patient does have on and off edema for which she takes torsemide:/Spironolactone this seems to be controlling her fluid overload    No paracentesis since 2019 she had been on once a week  · Torsemide 60 mg twice a day  · Spironolactone 150 mg twice a day  · Micro-K 40 mEq 2 times a day    · History of hypertension for at least 2019:  · Torsemide/spironolactone  · Carvedilol 6 25 mg only as needed to maintain systolic blood pressure less than 110  She is monitoring her blood pressure twice a day morning and evening    · No history diabetes mellitus    Pertinent labs: From 4/10/2023  · UA microscopic from 4/10/2023: Negative occult blood, negative protein, positive leukocytes, microscopic 3-5 RBCs 11-20 WBCs and bacteria ***  · Urine protein creatinine ratio: 0 17 g  · Vitamin D 33/phosphorus 4 3  · Hemoglobin 9 6 and platelet count 93 K  · PTH intact level 35  · Creatinine: 1 46 (historically 1 3-1 6)  · Sodium 128/potassium 4 5 and glucose 119  · Creatinine: 1 27 from 5/3/2022  · Sodium now 132 with a glucose 121  · Potassium 3 9  · Urine protein creatinine ratio 0 09 g  · UA: Negative proteinuria/negative occult blood; 0-2 RBCs/6-10 WBCs but no signs or symptoms of an infection  · CBC: Hemoglobin 8 6/WBC 5 3/platelets 66 K  · Ferritin 5/iron 11%  · Lipid profile: LDL 59/HDL 84/triglycerides 48  · TSH normal at 1 19    Review of systems:    General: No recent fevers chills cough or colds, appetite is good, energy is fair, weight is very labile elated to fluids  Cardiovascular: No chest pain or shortness of breath  Respiratory: Does cough occasional sputum production not new no wheezing  Gastrointestinal: No significant nausea vomiting or diarrhea no bright low blood per rectum but did have esophageal varices    She health maintenance: Last colonoscopy 1 year ago  Genitourinary: See HPI  Neurology: No headaches, or dizziness or lightheadedness upon standing  All other systems were reviewed and are negative    Historical Information   Past Medical History:   Diagnosis Date   • Alcohol abuse    • Cirrhosis ([facility] [address] )    • Hypertension    No history of CAD/CHF/CVA/seizures/thyroid disease/diabetes mellitus/cancer    Past Surgical History:   Procedure Laterality Date   • IR PARACENTESIS  3/8/2021     Social History   Social History     Substance and Sexual Activity   Alcohol Use Not Currently    Comment: currently 28 days sober   Addendum patient is now sober 3 years  Social History     Substance and Sexual Activity   Drug Use Never     Social History     Tobacco Use   Smoking Status Former   • Types: Cigarettes   • Quit date:    • Years since quittin 3   Smokeless Tobacco Never   Tries to avoid salt  · No dedicated exercise    Family history:  - Family history of kidney disease  - No history of hypertension in the family    Meds/Allergies    all current active meds have been reviewed, current meds:   Current Outpatient Medications:   •  acetaminophen (TYLENOL) 325 mg tablet, Take 650 mg by mouth as needed for mild pain, Disp: , Rfl:   •  carvedilol (COREG) 6 25 mg tablet, Take 6 25 mg by mouth as needed, Disp: , Rfl:   •  cyanocobalamin (VITAMIN B-12) 1000 MCG tablet, Take 1,000 mcg by mouth daily, Disp: , Rfl:   •  hydrOXYzine HCL (ATARAX) 50 mg tablet, Take 50 mg by mouth as needed, Disp: , Rfl:   •  lactulose (CEPHULAC) 10 g packet, Take 20 g by mouth 2 (two) times a day, Disp: , Rfl:   •  pantoprazole (PROTONIX) 40 mg tablet, Take 40 mg by mouth 2 (two) times a day, Disp: , Rfl:   •  polyethylene glycol (MIRALAX) 17 g packet, Take 17 g by mouth daily, Disp: , Rfl:   •  potassium chloride (MICRO-K) 10 MEQ CR capsule, Take 40 mEq by mouth 2 (two) times a day, Disp: , Rfl:   •  pregabalin (LYRICA) 100 mg capsule, Take 100 mg by mouth 2 (two) times a day, Disp: , Rfl:   •  rifaximin (XIFAXAN) 550 mg tablet, Take 550 mg by mouth 2 (two) times a day, Disp: , Rfl:   •  spironolactone (ALDACTONE) 100 mg tablet, Take 1 tablet (100 mg total) by mouth daily (Patient taking differently: Take 150 mg by mouth 2 (two) times a day), Disp: 30 tablet, Rfl: 1  •  torsemide (DEMADEX) 20 mg tablet, Take 60 mg by mouth 2 (two) times a day, Disp: , Rfl:   •  traZODone (DESYREL) 50 mg tablet, Take 50 mg by mouth daily at bedtime, Disp: , Rfl:   •  vitamin E, tocopherol, 200 units capsule, Take 200 Units by mouth daily, Disp: , Rfl:   •  bisacodyl (DULCOLAX) 10 mg suppository, Insert 10 mg into the rectum as needed for constipation (Patient not taking: Reported on 5/12/2023), Disp: , Rfl:   •  escitalopram (LEXAPRO) 10 mg tablet, Take 10 mg by mouth daily (Patient not taking: Reported on 5/12/2023), Disp: , Rfl:   •  ferrous sulfate 325 (65 Fe) mg tablet, Take 325 mg by mouth daily with breakfast (Patient not taking: Reported on 5/12/2023), Disp: , Rfl:   •  folic acid (FOLVITE) 1 mg tablet, Take 400 mcg by mouth daily (Patient not taking: Reported on 5/12/2023), Disp: , Rfl:   •  furosemide (LASIX) 20 mg tablet, Take 3 tablets (60 mg total) by mouth daily (Patient not taking: Reported on 5/12/2023), Disp: 30 tablet, Rfl: 2  •  iron polysaccharides (FERREX) 150 mg capsule, Take 150 mg by mouth 2 (two) times a day (Patient not taking: Reported on 5/12/2023), Disp: , Rfl:   •  lactulose 20 g/30 mL, Take 45 mL (30 g total) by mouth 3 (three) times a day (Patient not taking: Reported on 5/12/2023), Disp: 1892 mL, Rfl: 2  •  Melatonin 5 MG TABS, Take 5 mg by mouth (Patient not taking: Reported on 5/12/2023), Disp: , Rfl:   •  methocarbamol (ROBAXIN) 500 mg tablet, Take 750 mg by mouth daily at bedtime as needed for muscle spasms  (Patient not taking: Reported on 5/12/2023), Disp: , Rfl:   •  ondansetron (ZOFRAN) 4 mg tablet, Take 4 mg by mouth every 8 (eight) hours as needed for nausea or vomiting (Patient not taking: Reported on 5/12/2023), Disp: , Rfl:   •  ondansetron (ZOFRAN-ODT) 4 mg disintegrating tablet, Take 4 mg by mouth every 8 (eight) hours as needed (Patient not taking: Reported on 5/12/2023), Disp: , Rfl:   •  SALINE NASAL SPRAY NA, into each nostril (Patient not taking: Reported on 5/12/2023), Disp: , Rfl:   •  thiamine 100 MG tablet, Take 100 mg by mouth daily (Patient not taking: Reported on 5/12/2023), Disp: , Rfl:   •  triamcinolone (KENALOG) 0 5 % cream, Apply topically 3 (three) times a day (Patient not taking: Reported on 5/12/2023), Disp: , Rfl:   •  trimethobenzamide (TIGAN) 300 mg capsule, Take 300 mg by mouth as needed for nausea (Patient not taking: Reported on 5/12/2023), Disp: , Rfl:   •  zinc sulfate (ZINCATE) 220 mg capsule, Take 110 mg by mouth daily (Patient not taking: Reported on 5/12/2023), Disp: , Rfl:     No Known Allergies    Objective    BP sitting on right: 120/72 with a heart rate of 72 and regular  BP sitting on left: 122/72 with a heart rate of 72 and regular  BP standing on left: 116/70 with a heart rate of 80 and regular  Body mass index is 26 54 kg/m²  General:  Well-developed well-nourished no acute distress  Skin:  No acute rash  Eyes:  No scleral icterus, noninjected, conjunctiva appear normal, no discharge from the eyes  ENT:  Normocephalic/atraumatic, mucous membranes moist, tongue appears normal size  Neck:  Supple, no jugular venous distention, 1+ carotid upstroke, no carotid (submandibular fat pad) bruits; trachea is midline, no thyromegaly; no lymphadenopathy  Back:  No CVA tenderness, spine appears midline without any overt abnormalities  Chest:  Clear to auscultation and percussion, good respiratory effort, no use of accessory respiratory muscles  CVS:  Regular rate and rhythm without a murmur rub or gallops appreciable  Abdomen/gastrointestinal:  Normal bowel sounds, nontender and mildyl distended without hepatosplenomegaly or bruits; no masses appreciable (no apparent fluid wave?)  Extremities:  No clubbing, no cyanosis, no edema, 1+ dorsalis pedis pulses, no femoral bruits, no arthritic changes and full range of motion  Neuro:  No gross focality  Psych:  Alert, oriented and appropriate    Current Weight:   Weight (last 2 days)     Date/Time Weight    05/12/23 1510 70 1 (154 6)            Lab Results:  I have personally reviewed pertinent labs    Results for orders placed or performed in visit on 05/12/23   POCT urine dip   Result Value Ref Range    LEUKOCYTE ESTERASE,UA neg     NITRITE,UA neg     SL AMB POCT UROBILINOGEN 0 2     POCT URINE PROTEIN neg      PH,UA 6 5     BLOOD,UA neg     SPECIFIC GRAVITY,UA 1 010     KETONES,UA neg     BILIRUBIN,UA neg     GLUCOSE, UA neg      COLOR,UA yellow     CLARITY,UA clear                ASSESSMENT AND PLAN:  47y o  year old female with a history of alcoholic cirrhosis/hypertension who we are asked to see regarding CKD    1  CKD stage 3B  • Etiology: Possible chronic hepatorenal syndrome from cirrhosis/hypertensive nephrosclerosis  No evidence of obstructive uropathy  Also very importantly related to high-dose diuretics to maintain euvolemia  • Baseline creatinine: 1 3-1 6; most recent 1 27 from 5/3/2023  Recommend:  • Workup:  o Follow-up chemistry  o UA with microscopic: Please see above +6-10 WBCs no RBCs no proteinuria  o Urine protein creatinine ratio: Please see above: 0 09 g  o Mineral bone disorder evaluation from CKD including magnesium/phosphorus/PTH intact level/vitamin-D level  o CBC: Please see above chronic thrombocytopenia and anemia most compatible with cirrhosis: Low iron studies please see above potential treatment with iron patient had constipation with oral iron I will give her an iron infusion  o Lipid profile: Please see above goal  o Renal imaging: From 4/3/2023 demonstrated no obstructive uropathy nonobstructing renal calculi      • Treatment:  o Treat hypertension, please see below for recommendations  o Treat dyslipidemia  o Avoid nephrotoxic agents such as NSAIDs, and proton pump inhibitors as able; patient counseled as such  o Good overall health recommendations including weight loss as appropriate, isotonic exercise as able, and avoidance of salt; patient counseled as such  o The patient has been doing quite well on current diuretic regimen as outlined I would not make any changes  Follow-up closely with gastroenterology  o We will continue to monitor labs closely along with GI    Further workup and treatment recommendations will be forthcoming depending upon the above results and course    2  Hypertension:  • Goal:  Less than 130/80 given CKD  • Push nonmedical regimen as outlined above  • Medication changes today:  • No changes patient has a defined regimen she will just take a week of blood pressure readings          3   Hyponatremia: Most likely related to cirrhosis with increased ADH: We will perform simple work-up apply modest fluid restriction potential use for loop diuretics  · Work-up: Urine sodium/urine osmolality/serum osmolality/a m  cortisol/uric acid  CT scan without overt pathology except for cirrhosis and splenomegaly  Chest x-ray from April also unremarkable  And thyroid profile normal   · Treatment: Modest fluid restriction 1500 mL/day potential use for loop diuretic depending upon results and volume status      4  Other problems:  • Alcoholic cirrhosis evaluated by transplant at the [address] [place] [address] Drive followed closely by Dr Hao Davidson from GI  • Hypertension      Patient Instructions   1  Medication changes today:  • No medication changes today  • He is try to stick with a 1500 mL or 48-64 ounce fluid restriction at this time  • Continue to adhere to a very low sodium diet this will help prevent fluid collection    We will also set you up for iron infusion at this time    2  Please go for non fasting  lab work over the next 1 to 2 weeks but please do this in the morning, these will include some urine studies and also a stool check to make sure no bleeding      3   Please take 1 week a blood pressure readings at your convenience at this time    AS FOLLOWS  MORNING AND EVENING, SITTING as follows:  · TAKE THE MORNING READINGS BEFORE ANY MEDICATIONS AND WHEN YOU ARE RELAXED FOR SEVERAL MINUTES  · TAKE THE EVENING READINGS:  BETWEEN 7-10 P M ; PRIOR TO ANY MEDICATIONS; AT LEAST IN OUR  FROM DINNER; AND CERTAINLY AFTER RELAXING FOR A FEW MINUTES  · PLEASE INCLUDE HEART RATE WITH YOUR BLOOD PRESSURE READINGS  · When taking standing readings, keep your arm supported at heart level and not dangling  · Make sure you are sitting with your back supported and feet on the ground and do not cross your legs or feet  · Make sure you have not taken any coffee or caffeine products or exercised or smoke cigarettes at least 30 minutes before taking your blood pressure  Then "please mail these readings into the office    4  In 3 months:  • Please go for nonfasting lab work but in the morning  • Please take 1 week a blood pressure readings as outlined above and mail those into the office      5  Follow-up in 6 months  • Please bring in 1 week a blood pressure readings morning evening, sitting and standing is outlined above  • PLEASE BRING AN YOUR BLOOD PRESSURE MACHINE TO CORRELATE WITH THE OFFICE MACHINE AT THIS NEXT SCHEDULED VISIT  • Please go for fasting lab work 1-2 weeks prior to your appointment      6  General instructions:  • AVOID SALT BUT NOT ADDING AN READING LABELS TO MAKE SURE THERE IS LOW-SALT IN THE FOOD THAT YOU ARE EATING  o Goal is less than 2 g of sodium intake or less than 5 g of sodium chloride intake per day    • Avoid nonsteroidal anti-inflammatory drugs such as Naprosyn, ibuprofen, Aleve, Advil, Celebrex, Meloxicam (Mobic) etc   You can use Tylenol as needed if you do not have any liver condition to be concerned about    • Avoid medications such as Sudafed or decongestants and antihistamines that contained the D component which is the decongestant  You can take antihistamines without the decongestant or D component  • Try to avoid medications such as pantoprazole or  Protonix/Nexium or Esomeprazole)/Prilosec or omeprazole/Prevacid or lansoprazole/AcipHex or Rabeprazole  If you are able to, use Pepcid as this is safer for your kidneys  • Try to exercise at least 30 minutes 3 days a week to begin with with an ultimate goal of 5 days a week for at least 30 minutes    • Try to lose 5-10 lb by your next visit    • Please do not drink more than 2 glasses of alcohol/wine on a daily basis as this may contribute to your high blood pressure  Portions of the record may have been created with voice recognition software  Occasional wrong word or \"sound a like\" substitutions may have occurred due to the inherent limitations of voice recognition software    " Read the chart carefully and recognize, using context, where substitutions have occurred      Kriss Dai MD

## 2023-05-12 NOTE — PATIENT INSTRUCTIONS
1  Medication changes today:  No medication changes today  He is try to stick with a 1500 mL or 48-64 ounce fluid restriction at this time  Continue to adhere to a very low sodium diet this will help prevent fluid collection    We will also set you up for iron infusion at this time    2  Please go for non fasting  lab work over the next 1 to 2 weeks but please do this in the morning, these will include some urine studies and also a stool check to make sure no bleeding  3   Please take 1 week a blood pressure readings at your convenience at this time    AS FOLLOWS  MORNING AND EVENING, SITTING as follows:  TAKE THE MORNING READINGS BEFORE ANY MEDICATIONS AND WHEN YOU ARE RELAXED FOR SEVERAL MINUTES  TAKE THE EVENING READINGS:  BETWEEN 7-10 P M ; PRIOR TO ANY MEDICATIONS; AT LEAST IN OUR  FROM DINNER; AND CERTAINLY AFTER RELAXING FOR A FEW MINUTES  PLEASE INCLUDE HEART RATE WITH YOUR BLOOD PRESSURE READINGS  When taking standing readings, keep your arm supported at heart level and not dangling  Make sure you are sitting with your back supported and feet on the ground and do not cross your legs or feet  Make sure you have not taken any coffee or caffeine products or exercised or smoke cigarettes at least 30 minutes before taking your blood pressure  Then please mail these readings into the office    4  In 3 months:  Please go for nonfasting lab work but in the morning  Please take 1 week a blood pressure readings as outlined above and mail those into the office      5  Follow-up in 6 months  Please bring in 1 week a blood pressure readings morning evening, sitting and standing is outlined above  PLEASE BRING AN YOUR BLOOD PRESSURE MACHINE TO CORRELATE WITH THE OFFICE MACHINE AT THIS NEXT SCHEDULED VISIT  Please go for fasting lab work 1-2 weeks prior to your appointment      6    General instructions:  AVOID SALT BUT NOT ADDING AN READING LABELS TO MAKE SURE THERE IS LOW-SALT IN THE FOOD THAT YOU ARE EATING  Goal is less than 2 g of sodium intake or less than 5 g of sodium chloride intake per day    Avoid nonsteroidal anti-inflammatory drugs such as Naprosyn, ibuprofen, Aleve, Advil, Celebrex, Meloxicam (Mobic) etc   You can use Tylenol as needed if you do not have any liver condition to be concerned about    Avoid medications such as Sudafed or decongestants and antihistamines that contained the D component which is the decongestant  You can take antihistamines without the decongestant or D component  Try to avoid medications such as pantoprazole or  Protonix/Nexium or Esomeprazole)/Prilosec or omeprazole/Prevacid or lansoprazole/AcipHex or Rabeprazole  If you are able to, use Pepcid as this is safer for your kidneys  Try to exercise at least 30 minutes 3 days a week to begin with with an ultimate goal of 5 days a week for at least 30 minutes    Try to lose 5-10 lb by your next visit    Please do not drink more than 2 glasses of alcohol/wine on a daily basis as this may contribute to your high blood pressure

## 2023-05-15 ENCOUNTER — TELEPHONE (OUTPATIENT)
Dept: INFUSION CENTER | Facility: CLINIC | Age: 53
End: 2023-05-15

## 2023-05-15 ENCOUNTER — TELEPHONE (OUTPATIENT)
Dept: NEPHROLOGY | Facility: CLINIC | Age: 53
End: 2023-05-15

## 2023-05-15 DIAGNOSIS — R09.89 CAROTID BRUIT, UNSPECIFIED LATERALITY: Primary | ICD-10-CM

## 2023-05-15 NOTE — TELEPHONE ENCOUNTER
Patient called to schedule appointments  Patient asked to confirm the location of the [facility] and showed understanding on where to find us       Appointments were scheduled for:    6/8/23 at 10:00 AM  6/15/23 at 10:00 AM

## 2023-05-15 NOTE — TELEPHONE ENCOUNTER
Patient called to see if there was any availability for an iron treatment before June 1st  Patient was notified about some availability on May 29th  This is a scheduling error being that the [facility] is going to be closed for memorial day on that date  Patient will be called back asap to discuss her schedule

## 2023-05-15 NOTE — TELEPHONE ENCOUNTER
Patient was called to inform her about a change in her schedule due to an error  Patient did not answer and was left a message to inform her that the infusion center is actually closed on memorial day 5/29  I apologized for the error and our phone number was given and she was notified to give us a call back

## 2023-05-21 LAB
25(OH)D3 SERPL-MCNC: 35 NG/ML (ref 30–100)
BUN SERPL-MCNC: 30 MG/DL (ref 7–25)
BUN/CREAT SERPL: 24 (CALC) (ref 6–22)
CALCIUM SERPL-MCNC: 8.6 MG/DL (ref 8.6–10.4)
CALCIUM SERPL-MCNC: 8.6 MG/DL (ref 8.6–10.4)
CHLORIDE SERPL-SCNC: 100 MMOL/L (ref 98–110)
CHOLEST SERPL-MCNC: 147 MG/DL
CHOLEST/HDLC SERPL: 1.9 (CALC)
CO2 SERPL-SCNC: 24 MMOL/L (ref 20–32)
CORTIS AM PEAK SERPL-MCNC: 18.1 MCG/DL
CREAT SERPL-MCNC: 1.24 MG/DL (ref 0.5–1.03)
CREAT UR-MCNC: 62 MG/DL (ref 20–275)
GFR/BSA.PRED SERPLBLD CYS-BASED-ARV: 53 ML/MIN/1.73M2
GLUCOSE SERPL-MCNC: 92 MG/DL (ref 65–99)
HDLC SERPL-MCNC: 77 MG/DL
LDLC SERPL CALC-MCNC: 57 MG/DL (CALC)
MAGNESIUM SERPL-MCNC: 1.9 MG/DL (ref 1.5–2.5)
NONHDLC SERPL-MCNC: 70 MG/DL (CALC)
OSMOLALITY SERPL: 275 MOSM/KG (ref 278–305)
OSMOLALITY UR: 190 MOSM/KG (ref 50–1200)
PHOSPHATE SERPL-MCNC: 3.9 MG/DL (ref 2.5–4.5)
POTASSIUM SERPL-SCNC: 3.7 MMOL/L (ref 3.5–5.3)
PTH-INTACT SERPL-MCNC: 40 PG/ML (ref 16–77)
SODIUM SERPL-SCNC: 132 MMOL/L (ref 135–146)
SODIUM UR-SCNC: 16 MMOL/L (ref 28–272)
SODIUM/CREAT UR-SRTO: 26 MMOL/G CREAT (ref 28–280)
TRIGL SERPL-MCNC: 52 MG/DL
URATE SERPL-MCNC: 7.4 MG/DL (ref 2.5–7)

## 2023-05-22 ENCOUNTER — TELEPHONE (OUTPATIENT)
Dept: NEPHROLOGY | Facility: CLINIC | Age: 53
End: 2023-05-22

## 2023-05-22 DIAGNOSIS — N18.32 STAGE 3B CHRONIC KIDNEY DISEASE (HCC): Primary | ICD-10-CM

## 2023-05-22 NOTE — TELEPHONE ENCOUNTER
----- Message from Sherren Shackleton, MD sent at 5/22/2023  6:35 AM EDT -----  As noted Labs reviewed please repeat a basic metabolic profile in 1 month  ----- Message -----  From: Eri Vaughn Results In  Sent: 5/18/2023   1:30 AM EDT  To: Sherren Shackleton, MD

## 2023-05-23 ENCOUNTER — TELEPHONE (OUTPATIENT)
Dept: NEPHROLOGY | Facility: CLINIC | Age: 53
End: 2023-05-23

## 2023-05-23 NOTE — TELEPHONE ENCOUNTER
LM for patient about the following, asked her to call back if she has any questions:    ----- Message from Archie Campoverde MD sent at 5/23/2023 11:06 AM EDT -----  Please let the patient know her stool card was negative  ----- Message -----  From: Deepa Jesus Results In  Sent: 5/23/2023  11:00 AM EDT  To: Archie Campoverde MD

## 2023-05-26 NOTE — TELEPHONE ENCOUNTER
Called and spoke to the patient to relay the message above as the [employer] message had gone unread  Patient expressed understanding  She did want to inform the office that her blood pressure drops when she goes from sitting to standing  Last reading while standing was 94/67  States that she has felt no symptoms with these instances  Patient wants to complete her week of BP readings of which she has 2 days left, and send them through [employer]  Agreeable to BMP in 1 month  Lab order mailed per patient request     Routing to Dr Paul Cordova to inform him of orthostatic hypotension

## 2023-05-30 ENCOUNTER — TELEPHONE (OUTPATIENT)
Dept: NEPHROLOGY | Facility: CLINIC | Age: 53
End: 2023-05-30

## 2023-06-02 ENCOUNTER — HOSPITAL ENCOUNTER (OUTPATIENT)
Dept: VASCULAR ULTRASOUND | Facility: HOSPITAL | Age: 53
Discharge: HOME | End: 2023-06-02
Attending: INTERNAL MEDICINE

## 2023-06-02 DIAGNOSIS — R09.89 CAROTID BRUIT, UNSPECIFIED LATERALITY: ICD-10-CM

## 2023-06-03 LAB
BUN SERPL-MCNC: 32 MG/DL (ref 7–25)
BUN/CREAT SERPL: 24 (CALC) (ref 6–22)
CALCIUM SERPL-MCNC: 8.2 MG/DL (ref 8.6–10.4)
CHLORIDE SERPL-SCNC: 99 MMOL/L (ref 98–110)
CO2 SERPL-SCNC: 23 MMOL/L (ref 20–32)
CREAT SERPL-MCNC: 1.33 MG/DL (ref 0.5–1.03)
GFR/BSA.PRED SERPLBLD CYS-BASED-ARV: 48 ML/MIN/1.73M2
GLUCOSE SERPL-MCNC: 135 MG/DL (ref 65–139)
POTASSIUM SERPL-SCNC: 3.8 MMOL/L (ref 3.5–5.3)
SODIUM SERPL-SCNC: 131 MMOL/L (ref 135–146)

## 2023-06-05 ENCOUNTER — TELEPHONE (OUTPATIENT)
Dept: NEPHROLOGY | Facility: CLINIC | Age: 53
End: 2023-06-05

## 2023-06-05 DIAGNOSIS — N18.32 STAGE 3B CHRONIC KIDNEY DISEASE (HCC): Primary | ICD-10-CM

## 2023-06-05 NOTE — TELEPHONE ENCOUNTER
----- Message from Romi Nascimento MD sent at 6/5/2023  8:52 AM EDT -----  Creatinine/kidney function remained stable  Sodium still low: The key treatment is fluid restriction 1500 mL/day eating adequately is important as well      Recommend a basic metabolic profile in 4 weeks  ----- Message -----  From: Judy Guevara Results In  Sent: 6/3/2023   4:45 AM EDT  To: Romi Nascimento MD

## 2023-06-05 NOTE — TELEPHONE ENCOUNTER
Spoke with patient about the following, she expressed understanding and thanked us for the call:    ----- Message from Shannan Liu MD sent at 6/2/2023  3:54 PM EDT -----  Please let patient know no significant blockages on carotid artery duplex  ----- Message -----  From: Interface, Radiology Results In  Sent: 6/2/2023   3:50 PM EDT  To: Shannan Liu MD

## 2023-06-06 DIAGNOSIS — D63.1 ANEMIA IN STAGE 3B CHRONIC KIDNEY DISEASE: Primary | ICD-10-CM

## 2023-06-06 DIAGNOSIS — N18.32 ANEMIA IN STAGE 3B CHRONIC KIDNEY DISEASE: Primary | ICD-10-CM

## 2023-06-06 DIAGNOSIS — D50.8 OTHER IRON DEFICIENCY ANEMIA: ICD-10-CM

## 2023-06-06 RX ORDER — SODIUM CHLORIDE 9 MG/ML
20 INJECTION, SOLUTION INTRAVENOUS ONCE
Status: CANCELLED | OUTPATIENT
Start: 2023-06-12

## 2023-06-08 DIAGNOSIS — D50.8 OTHER IRON DEFICIENCY ANEMIA: ICD-10-CM

## 2023-06-08 DIAGNOSIS — N18.32 ANEMIA IN STAGE 3B CHRONIC KIDNEY DISEASE: Primary | ICD-10-CM

## 2023-06-08 DIAGNOSIS — D63.1 ANEMIA IN STAGE 3B CHRONIC KIDNEY DISEASE: Primary | ICD-10-CM

## 2023-06-08 RX ORDER — SODIUM CHLORIDE 9 MG/ML
20 INJECTION, SOLUTION INTRAVENOUS ONCE
Status: CANCELLED | OUTPATIENT
Start: 2023-06-19

## 2023-06-21 ENCOUNTER — TELEPHONE (OUTPATIENT)
Dept: NEPHROLOGY | Facility: CLINIC | Age: 53
End: 2023-06-21

## 2023-06-21 DIAGNOSIS — N18.32 ANEMIA IN STAGE 3B CHRONIC KIDNEY DISEASE (HCC): Primary | ICD-10-CM

## 2023-06-21 DIAGNOSIS — D63.1 ANEMIA IN STAGE 3B CHRONIC KIDNEY DISEASE (HCC): Primary | ICD-10-CM

## 2023-06-21 NOTE — TELEPHONE ENCOUNTER
Patient called because she is currently admitted at [facility] and she received iron infusions and blood transfusion while there  She is wondering about follow up blood work and if she needs to have the outpatient infusions

## 2023-06-22 NOTE — TELEPHONE ENCOUNTER
Patient called back, she had no recommendations to follow up with hematology  Patient left hospital [facility] and she said she had no instructions from them about follow up  She will go for CBC in the next couple weeks  Lab script mailed to patient

## 2023-06-22 NOTE — TELEPHONE ENCOUNTER
LM for patient about the following, asked her to call back if she has any questions:     You can see if somebody is going to be following her for her hemoglobin and iron since she received transfusions and iron there if they are following her that they can determine follow-up labs otherwise I would just recommend a CBC if they are not going to follow her in the next few weeks at her convenience

## 2023-06-30 DIAGNOSIS — N18.32 STAGE 3B CHRONIC KIDNEY DISEASE (HCC): Primary | ICD-10-CM

## 2023-06-30 RX ORDER — TORSEMIDE 20 MG/1
60 TABLET ORAL 2 TIMES DAILY
Qty: 540 TABLET | Refills: 3 | Status: SHIPPED | OUTPATIENT
Start: 2023-06-30

## 2023-07-05 ENCOUNTER — TELEPHONE (OUTPATIENT)
Dept: PSYCHIATRY | Facility: CLINIC | Age: 53
End: 2023-07-05

## 2023-07-05 NOTE — TELEPHONE ENCOUNTER
Pt called in and left a voicemail seeking treatment. She stated that her Liver Transplant team gave the number here for her to call.  She stated in the email it was due to Alcohol abuse earlier in life, Please reach out to her in regards to this, [facility] # [phone]

## 2023-08-22 ENCOUNTER — TELEPHONE (OUTPATIENT)
Dept: NEPHROLOGY | Facility: CLINIC | Age: 53
End: 2023-08-22

## 2023-08-22 DIAGNOSIS — N18.32 STAGE 3B CHRONIC KIDNEY DISEASE (HCC): ICD-10-CM

## 2023-08-22 DIAGNOSIS — N18.32 ANEMIA IN STAGE 3B CHRONIC KIDNEY DISEASE: Primary | ICD-10-CM

## 2023-08-22 DIAGNOSIS — N18.32 ANEMIA IN STAGE 3B CHRONIC KIDNEY DISEASE (HCC): Primary | ICD-10-CM

## 2023-08-22 DIAGNOSIS — D63.1 ANEMIA IN STAGE 3B CHRONIC KIDNEY DISEASE (HCC): Primary | ICD-10-CM

## 2023-08-22 DIAGNOSIS — D50.8 OTHER IRON DEFICIENCY ANEMIA: ICD-10-CM

## 2023-08-22 DIAGNOSIS — D63.1 ANEMIA IN STAGE 3B CHRONIC KIDNEY DISEASE: Primary | ICD-10-CM

## 2023-08-22 LAB
BUN SERPL-MCNC: 36 MG/DL (ref 7–25)
BUN/CREAT SERPL: 28 (CALC) (ref 6–22)
CALCIUM SERPL-MCNC: 8.1 MG/DL (ref 8.6–10.4)
CHLORIDE SERPL-SCNC: 96 MMOL/L (ref 98–110)
CO2 SERPL-SCNC: 24 MMOL/L (ref 20–32)
CREAT SERPL-MCNC: 1.27 MG/DL (ref 0.5–1.03)
ERYTHROCYTE [DISTWIDTH] IN BLOOD BY AUTOMATED COUNT: 16.2 % (ref 11–15)
FERRITIN SERPL-MCNC: 12 NG/ML (ref 16–232)
GFR/BSA.PRED SERPLBLD CYS-BASED-ARV: 51 ML/MIN/1.73M2
GLUCOSE SERPL-MCNC: 118 MG/DL (ref 65–99)
HCT VFR BLD AUTO: 22.6 % (ref 35–45)
HGB BLD-MCNC: 7.5 G/DL (ref 11.7–15.5)
IRON SATN MFR SERPL: 9 % (CALC) (ref 16–45)
IRON SERPL-MCNC: 34 MCG/DL (ref 45–160)
MAGNESIUM SERPL-MCNC: 2.2 MG/DL (ref 1.5–2.5)
MCH RBC QN AUTO: 31.5 PG (ref 27–33)
MCHC RBC AUTO-ENTMCNC: 33.2 G/DL (ref 32–36)
MCV RBC AUTO: 95 FL (ref 80–100)
PLATELET # BLD AUTO: 53 THOUSAND/UL (ref 140–400)
PMV BLD REES-ECKER: 12.1 FL (ref 7.5–12.5)
POTASSIUM SERPL-SCNC: 4.3 MMOL/L (ref 3.5–5.3)
RBC # BLD AUTO: 2.38 MILLION/UL (ref 3.8–5.1)
SODIUM SERPL-SCNC: 127 MMOL/L (ref 135–146)
TIBC SERPL-MCNC: 363 MCG/DL (CALC) (ref 250–450)
WBC # BLD AUTO: 4.5 THOUSAND/UL (ref 3.8–10.8)

## 2023-08-22 RX ORDER — SODIUM CHLORIDE 9 MG/ML
20 INJECTION, SOLUTION INTRAVENOUS ONCE
Status: CANCELLED | OUTPATIENT
Start: 2023-08-29

## 2023-08-22 NOTE — TELEPHONE ENCOUNTER
----- Message from Alonso Pena MD sent at 8/22/2023  1:39 PM EDT -----  This patient I believe was admitted to [facility] in June with a hemoglobin of 7.7  She should definitely follow-up with GI/primary physician possible hematology if she has not seen that person  Please let the primary physician know the hemoglobin is 7.7  I would arrange for either Feraheme or Venofer if patient agreeable please let me know and I will place orders  She should get a repeat CBC in 1 to 2 weeks  ----- Message -----  From: Tete Santos Lab Results In  Sent: 8/22/2023   2:30 AM EDT  To: Alonso Pena MD

## 2023-08-22 NOTE — TELEPHONE ENCOUNTER
----- Message from Barb Roth MD sent at 8/22/2023  7:45 AM EDT -----  2 things:  1. Patient's iron studies are low at the very least she should be taking iron over-the-counter every other day; but we need a more recent CBC to determine whether or not IV iron would be warranted    2. Stool for fecal immunochemical testing should be done to make sure no GI losses especially given history of cirrhosis    3.   Sodium low at 127 please reinforce fluid restriction repeat a basic metabolic profile in 1 week!  ----- Message -----  From: Flex Garcia Results In  Sent: 8/22/2023   2:30 AM EDT  To: Barb Roth MD

## 2023-08-22 NOTE — TELEPHONE ENCOUNTER
Spoke with patient about the following, she expressed understanding and is interested in iron infusions but isn't able to schedule them at this time. She will call back as soon as she is able to make an appointment. She expressed understanding of all other instructions:    Stool for fecal immunochemical testing should be done to make sure no GI losses especially given history of cirrhosis. Sodium low at 127 please reinforce fluid restriction repeat a basic metabolic profile in 1 week!  She should definitely follow-up with GI/primary physician possible hematology if she has not seen that person  Please let the primary physician know the hemoglobin is 7.7  I would arrange for either Feraheme or Venofer if patient agreeable please let me know and I will place orders  She should get a repeat CBC in 1 to 2 weeks

## 2023-08-29 ENCOUNTER — HOSPITAL ENCOUNTER (OUTPATIENT)
Dept: INFUSION CENTER | Facility: CLINIC | Age: 53
Discharge: HOME | End: 2023-08-29
Payer: MEDICARE

## 2023-08-29 VITALS
SYSTOLIC BLOOD PRESSURE: 110 MMHG | DIASTOLIC BLOOD PRESSURE: 56 MMHG | TEMPERATURE: 98.2 F | HEART RATE: 77 BPM | RESPIRATION RATE: 18 BRPM

## 2023-08-29 DIAGNOSIS — D50.8 OTHER IRON DEFICIENCY ANEMIA: Primary | ICD-10-CM

## 2023-08-29 DIAGNOSIS — N18.32 ANEMIA IN STAGE 3B CHRONIC KIDNEY DISEASE (HCC): ICD-10-CM

## 2023-08-29 DIAGNOSIS — D63.1 ANEMIA IN STAGE 3B CHRONIC KIDNEY DISEASE (HCC): ICD-10-CM

## 2023-08-29 RX ORDER — SODIUM CHLORIDE 9 MG/ML
20 INJECTION, SOLUTION INTRAVENOUS ONCE
Status: COMPLETED | OUTPATIENT
Start: 2023-08-29 | End: 2023-08-29

## 2023-08-29 RX ORDER — SODIUM CHLORIDE 9 MG/ML
20 INJECTION, SOLUTION INTRAVENOUS ONCE
Status: CANCELLED | OUTPATIENT
Start: 2023-09-06

## 2023-08-29 RX ADMIN — IRON SUCROSE 400 MG: 20 INJECTION, SOLUTION INTRAVENOUS at 14:12

## 2023-08-29 RX ADMIN — SODIUM CHLORIDE 20 ML/HR: 9 INJECTION, SOLUTION INTRAVENOUS at 14:12

## 2023-09-06 ENCOUNTER — HOSPITAL ENCOUNTER (OUTPATIENT)
Dept: INFUSION CENTER | Facility: CLINIC | Age: 53
Discharge: HOME | End: 2023-09-06
Payer: MEDICARE

## 2023-09-06 VITALS
HEART RATE: 64 BPM | SYSTOLIC BLOOD PRESSURE: 118 MMHG | TEMPERATURE: 97.3 F | DIASTOLIC BLOOD PRESSURE: 60 MMHG | RESPIRATION RATE: 18 BRPM

## 2023-09-06 DIAGNOSIS — D63.1 ANEMIA IN STAGE 3B CHRONIC KIDNEY DISEASE (HCC): ICD-10-CM

## 2023-09-06 DIAGNOSIS — D50.8 OTHER IRON DEFICIENCY ANEMIA: Primary | ICD-10-CM

## 2023-09-06 DIAGNOSIS — N18.32 ANEMIA IN STAGE 3B CHRONIC KIDNEY DISEASE (HCC): ICD-10-CM

## 2023-09-06 PROCEDURE — 96366 THER/PROPH/DIAG IV INF ADDON: CPT

## 2023-09-06 PROCEDURE — 96365 THER/PROPH/DIAG IV INF INIT: CPT

## 2023-09-06 RX ORDER — SODIUM CHLORIDE 9 MG/ML
20 INJECTION, SOLUTION INTRAVENOUS ONCE
Status: COMPLETED | OUTPATIENT
Start: 2023-09-06 | End: 2023-09-06

## 2023-09-06 RX ORDER — SODIUM CHLORIDE 9 MG/ML
20 INJECTION, SOLUTION INTRAVENOUS ONCE
Status: CANCELLED | OUTPATIENT
Start: 2023-09-13

## 2023-09-06 RX ADMIN — SODIUM CHLORIDE 400 MG: 0.9 INJECTION, SOLUTION INTRAVENOUS at 14:15

## 2023-09-06 RX ADMIN — SODIUM CHLORIDE 20 ML/HR: 0.9 INJECTION, SOLUTION INTRAVENOUS at 14:15

## 2023-09-06 NOTE — PROGRESS NOTES
Patient reports to unit offering no complaints today. Verified with Jorge Gomez MD that patient is to receive 400mg venofer over three hours weekly x 3 doses. Patient receiving Venofer, tolerated infusion well without any adverse events. Patient aware of next appointment, PIV removed. AVS declined.

## 2023-09-13 ENCOUNTER — HOSPITAL ENCOUNTER (OUTPATIENT)
Dept: INFUSION CENTER | Facility: CLINIC | Age: 53
Discharge: HOME | End: 2023-09-13
Payer: MEDICARE

## 2023-09-13 VITALS
SYSTOLIC BLOOD PRESSURE: 119 MMHG | RESPIRATION RATE: 18 BRPM | HEART RATE: 77 BPM | TEMPERATURE: 97.6 F | DIASTOLIC BLOOD PRESSURE: 65 MMHG

## 2023-09-13 DIAGNOSIS — N18.32 ANEMIA IN STAGE 3B CHRONIC KIDNEY DISEASE: ICD-10-CM

## 2023-09-13 DIAGNOSIS — D50.8 OTHER IRON DEFICIENCY ANEMIA: Primary | ICD-10-CM

## 2023-09-13 DIAGNOSIS — D63.1 ANEMIA IN STAGE 3B CHRONIC KIDNEY DISEASE: ICD-10-CM

## 2023-09-13 PROCEDURE — 96365 THER/PROPH/DIAG IV INF INIT: CPT

## 2023-09-13 PROCEDURE — 96366 THER/PROPH/DIAG IV INF ADDON: CPT

## 2023-09-13 RX ORDER — SODIUM CHLORIDE 9 MG/ML
20 INJECTION, SOLUTION INTRAVENOUS ONCE
Status: COMPLETED | OUTPATIENT
Start: 2023-09-13 | End: 2023-09-13

## 2023-09-13 RX ORDER — SODIUM CHLORIDE 9 MG/ML
20 INJECTION, SOLUTION INTRAVENOUS ONCE
Status: CANCELLED | OUTPATIENT
Start: 2023-09-13

## 2023-09-13 RX ADMIN — IRON SUCROSE 400 MG: 20 INJECTION, SOLUTION INTRAVENOUS at 12:19

## 2023-09-13 RX ADMIN — SODIUM CHLORIDE 20 ML/HR: 9 INJECTION, SOLUTION INTRAVENOUS at 12:16

## 2023-09-13 NOTE — PROGRESS NOTES
Patient reports to unit offering no complaints today. Patient receiving Venofer, tolerated infusion well without any adverse events. Patient aware of no new appointment, PIV removed. AVS declined.

## 2023-09-14 DIAGNOSIS — N18.32 STAGE 3B CHRONIC KIDNEY DISEASE (HCC): ICD-10-CM

## 2023-09-14 RX ORDER — TORSEMIDE 20 MG/1
80 TABLET ORAL 2 TIMES DAILY
Qty: 720 TABLET | Refills: 3 | Status: SHIPPED | OUTPATIENT
Start: 2023-09-14 | End: 2023-09-14 | Stop reason: SDUPTHER

## 2023-09-14 RX ORDER — TORSEMIDE 20 MG/1
100 TABLET ORAL 2 TIMES DAILY
Qty: 900 TABLET | Refills: 3 | Status: SHIPPED | OUTPATIENT
Start: 2023-09-14

## 2023-09-14 NOTE — TELEPHONE ENCOUNTER
Patient called as she has run out of her torsemide early again, and insurance will not cover it until 10/10/23. Patient states that she has been taking her torsemide at 80mg BID for "a long time". Advised her to make the office aware if she changes how she is taking the medication to avoid these problems in the future. She states that she was confused as the pharmacy is still filling Rxs from her old nephrologist. [place] her to call the pharmacy and cancel any prescriptions coming from that doctor to avoid confusion, and if she runs out of medication to call our office or her PCP. Patient expressed understanding and is agreeable. Patient completed lab work at [facility] 9/4. Please advise if refill for torsemide 80mg BID is agreeable.

## 2023-09-14 NOTE — TELEPHONE ENCOUNTER
Pt called back, she states prescription for Torsemide not accurate. Pt has been taking 100 mg bid prn for swelling.
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
## 2023-11-01 ENCOUNTER — OFFICE VISIT (OUTPATIENT)
Dept: NEPHROLOGY | Facility: CLINIC | Age: 53
End: 2023-11-01
Payer: MEDICARE

## 2023-11-01 VITALS — HEIGHT: 64 IN | WEIGHT: 165 LBS | BODY MASS INDEX: 28.17 KG/M2

## 2023-11-01 DIAGNOSIS — N18.31 ANEMIA IN STAGE 3A CHRONIC KIDNEY DISEASE: ICD-10-CM

## 2023-11-01 DIAGNOSIS — N18.32 STAGE 3B CHRONIC KIDNEY DISEASE (HCC): ICD-10-CM

## 2023-11-01 DIAGNOSIS — N18.32 ANEMIA IN STAGE 3B CHRONIC KIDNEY DISEASE: ICD-10-CM

## 2023-11-01 DIAGNOSIS — D63.1 ANEMIA IN STAGE 3B CHRONIC KIDNEY DISEASE: ICD-10-CM

## 2023-11-01 DIAGNOSIS — D69.6 THROMBOCYTOPENIA (HCC): ICD-10-CM

## 2023-11-01 DIAGNOSIS — D63.1 ANEMIA IN STAGE 3A CHRONIC KIDNEY DISEASE: ICD-10-CM

## 2023-11-01 DIAGNOSIS — D50.8 OTHER IRON DEFICIENCY ANEMIA: ICD-10-CM

## 2023-11-01 DIAGNOSIS — I12.9 PARENCHYMAL RENAL HYPERTENSION, STAGE 1 THROUGH STAGE 4 OR UNSPECIFIED CHRONIC KIDNEY DISEASE: Primary | ICD-10-CM

## 2023-11-01 DIAGNOSIS — F10.10 ALCOHOL ABUSE: ICD-10-CM

## 2023-11-01 PROCEDURE — 99214 OFFICE O/P EST MOD 30 MIN: CPT | Performed by: INTERNAL MEDICINE

## 2023-11-01 RX ORDER — MELATONIN
1000 DAILY
COMMUNITY

## 2023-11-01 RX ORDER — LANOLIN ALCOHOL/MO/W.PET/CERES
400 CREAM (GRAM) TOPICAL DAILY
COMMUNITY
Start: 2023-10-21

## 2023-11-01 RX ORDER — SODIUM CHLORIDE 9 MG/ML
20 INJECTION, SOLUTION INTRAVENOUS ONCE
OUTPATIENT
Start: 2023-11-06
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
## 2023-11-02 ENCOUNTER — TELEPHONE (OUTPATIENT)
Dept: NEPHROLOGY | Facility: CLINIC | Age: 53
End: 2023-11-02

## 2023-11-02 NOTE — TELEPHONE ENCOUNTER
----- Message from Laura Cordero MD sent at 11/2/2023 11:02 AM EDT -----  Please let the patient know her thyroid profile is normal and magnesium is good at 2.0  ----- Message -----  From: Ashley Cabrera Incoming  Sent: 11/2/2023  10:20 AM EDT  To: Laura Cordero MD

## 2023-11-07 ENCOUNTER — TREATMENT (OUTPATIENT)
Dept: NEPHROLOGY | Facility: CLINIC | Age: 53
End: 2023-11-07

## 2023-11-07 DIAGNOSIS — D50.8 OTHER IRON DEFICIENCY ANEMIA: Primary | ICD-10-CM

## 2023-11-07 RX ORDER — SODIUM CHLORIDE 9 MG/ML
20 INJECTION, SOLUTION INTRAVENOUS ONCE
Status: CANCELLED | OUTPATIENT
Start: 2023-11-10

## 2023-11-07 RX ORDER — SODIUM CHLORIDE 9 MG/ML
20 INJECTION, SOLUTION INTRAVENOUS ONCE
OUTPATIENT
Start: 2023-11-10

## 2023-11-07 NOTE — TELEPHONE ENCOUNTER
Patient called regarding test results. Informed her of message above. She expressed understanding and had no further questions or concerns at this time.

## 2023-11-07 NOTE — TELEPHONE ENCOUNTER
LM for patient for venofer appts.   Patient is scheduled at [facility] on 11/21 @ 1:30pm, 11/24 @ 9am, and 11/28 9:30am.

## 2023-11-07 NOTE — TELEPHONE ENCOUNTER
[facility]IDEL for [facility] infusion to schedule venofer infusions for patient. Asked them to either call me back to schedule or the patient directly.

## 2023-11-21 ENCOUNTER — HOSPITAL ENCOUNTER (OUTPATIENT)
Dept: INFUSION CENTER | Facility: CLINIC | Age: 53
Discharge: HOME | End: 2023-11-21
Payer: MEDICARE

## 2023-11-21 VITALS
SYSTOLIC BLOOD PRESSURE: 128 MMHG | DIASTOLIC BLOOD PRESSURE: 61 MMHG | HEART RATE: 69 BPM | TEMPERATURE: 97.1 F | RESPIRATION RATE: 17 BRPM

## 2023-11-21 DIAGNOSIS — D50.8 OTHER IRON DEFICIENCY ANEMIA: Primary | ICD-10-CM

## 2023-11-21 PROCEDURE — 96365 THER/PROPH/DIAG IV INF INIT: CPT

## 2023-11-21 PROCEDURE — 96366 THER/PROPH/DIAG IV INF ADDON: CPT

## 2023-11-21 RX ORDER — SODIUM CHLORIDE 9 MG/ML
20 INJECTION, SOLUTION INTRAVENOUS ONCE
Status: COMPLETED | OUTPATIENT
Start: 2023-11-21 | End: 2023-11-21

## 2023-11-21 RX ORDER — SODIUM CHLORIDE 9 MG/ML
20 INJECTION, SOLUTION INTRAVENOUS ONCE
OUTPATIENT
Start: 2023-11-24

## 2023-11-21 RX ADMIN — SODIUM CHLORIDE 20 ML/HR: 9 INJECTION, SOLUTION INTRAVENOUS at 13:54

## 2023-11-21 RX ADMIN — IRON SUCROSE 400 MG: 20 INJECTION, SOLUTION INTRAVENOUS at 13:53

## 2023-11-21 NOTE — PROGRESS NOTES
Pt to clinic for IV venofer. Pt offers no complaints today, denies abx use and denies s/s of infection. Tolerated infusion without complications. Pt aware of next appointment. PIV removed. AVS was offered, pt declined. Pt ambulated out of clinic safely.

## 2023-12-05 ENCOUNTER — HOSPITAL ENCOUNTER (OUTPATIENT)
Dept: INFUSION CENTER | Facility: CLINIC | Age: 53
Discharge: HOME | End: 2023-12-05
Payer: MEDICARE

## 2023-12-05 DIAGNOSIS — D50.8 OTHER IRON DEFICIENCY ANEMIA: Primary | ICD-10-CM

## 2023-12-05 PROCEDURE — 96366 THER/PROPH/DIAG IV INF ADDON: CPT

## 2023-12-05 PROCEDURE — 96365 THER/PROPH/DIAG IV INF INIT: CPT

## 2023-12-05 RX ORDER — SODIUM CHLORIDE 9 MG/ML
20 INJECTION, SOLUTION INTRAVENOUS ONCE
Status: CANCELLED | OUTPATIENT
Start: 2023-12-08

## 2023-12-05 RX ORDER — SODIUM CHLORIDE 9 MG/ML
20 INJECTION, SOLUTION INTRAVENOUS ONCE
Status: COMPLETED | OUTPATIENT
Start: 2023-12-05 | End: 2023-12-05

## 2023-12-05 RX ADMIN — IRON SUCROSE 400 MG: 20 INJECTION, SOLUTION INTRAVENOUS at 13:13

## 2023-12-05 RX ADMIN — SODIUM CHLORIDE 20 ML/HR: 0.9 INJECTION, SOLUTION INTRAVENOUS at 13:13

## 2023-12-05 NOTE — PROGRESS NOTES
Pt here venofer, offering no complaints. Left arm IV placed with positive blood return noted. Tolerated infusion without incident, although she asked to stop a few minutes early as she was getting nervous about driving in dark. She had about 20 Ml's left in the bag. PIV removed. AVS declined. Walked out in stable condition.

## 2023-12-08 ENCOUNTER — HOSPITAL ENCOUNTER (OUTPATIENT)
Dept: INFUSION CENTER | Facility: CLINIC | Age: 53
End: 2023-12-08
Payer: MEDICARE

## 2023-12-08 VITALS
SYSTOLIC BLOOD PRESSURE: 157 MMHG | HEART RATE: 78 BPM | TEMPERATURE: 97 F | DIASTOLIC BLOOD PRESSURE: 70 MMHG | RESPIRATION RATE: 16 BRPM

## 2023-12-08 DIAGNOSIS — D50.8 OTHER IRON DEFICIENCY ANEMIA: Primary | ICD-10-CM

## 2023-12-08 PROCEDURE — 96365 THER/PROPH/DIAG IV INF INIT: CPT

## 2023-12-08 PROCEDURE — 96366 THER/PROPH/DIAG IV INF ADDON: CPT

## 2023-12-08 RX ORDER — SODIUM CHLORIDE 9 MG/ML
20 INJECTION, SOLUTION INTRAVENOUS ONCE
Status: CANCELLED | OUTPATIENT
Start: 2023-12-08

## 2023-12-08 RX ORDER — SODIUM CHLORIDE 9 MG/ML
20 INJECTION, SOLUTION INTRAVENOUS ONCE
Status: COMPLETED | OUTPATIENT
Start: 2023-12-08 | End: 2023-12-08

## 2023-12-08 RX ADMIN — IRON SUCROSE 400 MG: 20 INJECTION, SOLUTION INTRAVENOUS at 12:02

## 2023-12-08 RX ADMIN — SODIUM CHLORIDE 20 ML/HR: 0.9 INJECTION, SOLUTION INTRAVENOUS at 12:02

## 2023-12-08 NOTE — PROGRESS NOTES
Pt presents for venofer infusion offering no complaints. 20 minutes prior to infusion finishing pt reported feeling nausea, requesting discharge. AVS declined, therapy plan complete. Pt discharged without complication, ambulatory off unit with steady gait.

## 2024-01-29 ENCOUNTER — TELEPHONE (OUTPATIENT)
Dept: NEPHROLOGY | Facility: CLINIC | Age: 54
End: 2024-01-29

## 2024-01-29 ENCOUNTER — DOCUMENTATION (OUTPATIENT)
Dept: NEPHROLOGY | Facility: CLINIC | Age: 54
End: 2024-01-29

## 2024-01-29 NOTE — PROGRESS NOTES
Message left on patient's VM that   Dr. Huang feels her iron should be every other day for better absorption.

## 2024-01-29 NOTE — TELEPHONE ENCOUNTER
Message left on patient's VM that iron every other day is better absorbed per Dr. Huang.  Pt called back.  She already is on iron every day.  Any further recommendations?  (She doesn't feel as sluggish but c/o that she is always cold)    Message left on patient's VM to start OTC iron 3x weekly per Dr. Huang (ask that pt call back to ensure she received my message)    ----- Message from Grant Huang MD sent at 1/29/2024 10:45 AM EST -----  Iron OTC three times a week  ----- Message -----  From: Interface, Transcription Incoming  Sent: 1/29/2024  10:21 AM EST  To: Grant Huang MD

## 2024-02-21 PROBLEM — N30.00 ACUTE CYSTITIS WITHOUT HEMATURIA: Status: RESOLVED | Noted: 2019-10-29 | Resolved: 2024-02-21

## 2024-03-08 ENCOUNTER — TELEPHONE (OUTPATIENT)
Dept: NEPHROLOGY | Facility: CLINIC | Age: 54
End: 2024-03-08

## 2024-04-09 ENCOUNTER — TELEPHONE (OUTPATIENT)
Dept: NEPHROLOGY | Facility: CLINIC | Age: 54
End: 2024-04-09

## 2024-05-01 LAB
ANION GAP SERPL CALCULATED.3IONS-SCNC: 7 MMOL/L (ref 3–11)
BUN SERPL-MCNC: 29 MG/DL (ref 7–25)
CALCIUM SERPL-MCNC: 8.4 MG/DL (ref 8.5–10.1)
CHLORIDE SERPL-SCNC: 103 MMOL/L (ref 100–109)
CO2 SERPL-SCNC: 26 MMOL/L (ref 21–31)
CREAT SERPL-MCNC: 1.33 MG/DL (ref 0.4–1.1)
CREAT UR-MCNC: 79.2 MG/DL (ref 50–200)
CYTOLOGY CMNT CVX/VAG CYTO-IMP: ABNORMAL
ERYTHROCYTE [DISTWIDTH] IN BLOOD BY AUTOMATED COUNT: 14.7 % (ref 12–16)
FERRITIN SERPL-MCNC: 34 NG/ML (ref 11–306.8)
GFR/BSA.PRED SERPLBLD CYS-BASED-ARV: 48 ML/MIN/{1.73_M2}
GLUCOSE SERPL-MCNC: 115 MG/DL (ref 65–99)
HCT VFR BLD AUTO: 33.7 % (ref 35–43)
HGB BLD-MCNC: 12.2 G/DL (ref 11.5–14.5)
IRON SATN MFR SERPL: 18 % (ref 20–50)
IRON SERPL-MCNC: 56 UG/DL (ref 50–212)
MAGNESIUM SERPL-MCNC: 1.8 MG/DL (ref 1.4–2.2)
MCH RBC QN AUTO: 38.6 PG (ref 26–34)
MCHC RBC AUTO-ENTMCNC: 36.1 G/DL (ref 32–37)
MCV RBC AUTO: 107 FL (ref 80–100)
PLATELET # BLD AUTO: 40 THOU/CMM (ref 140–350)
PMV BLD REES-ECKER: 10.6 FL (ref 7.5–11.3)
POTASSIUM SERPL-SCNC: 3.9 MMOL/L (ref 3.5–5.2)
PROT UR-MCNC: 6.1 MG/DL
PROT/CREAT UR: 0.08
RBC # BLD AUTO: 3.16 MILL/CMM (ref 3.7–4.7)
SODIUM SERPL-SCNC: 136 MMOL/L (ref 135–145)
TIBC SERPL-MCNC: 311 UG/DL (ref 260–430)
TRANSFERRIN SERPL-MCNC: 222 MG/DL (ref 203–362)
WBC # BLD AUTO: 4.1 THOU/CMM (ref 4–10)

## 2024-05-06 ENCOUNTER — OFFICE VISIT (OUTPATIENT)
Dept: NEPHROLOGY | Facility: CLINIC | Age: 54
End: 2024-05-06
Payer: MEDICARE

## 2024-05-06 VITALS
SYSTOLIC BLOOD PRESSURE: 122 MMHG | WEIGHT: 168 LBS | BODY MASS INDEX: 28.68 KG/M2 | HEART RATE: 72 BPM | HEIGHT: 64 IN | DIASTOLIC BLOOD PRESSURE: 56 MMHG | OXYGEN SATURATION: 97 %

## 2024-05-06 DIAGNOSIS — E87.6 HYPOKALEMIA: ICD-10-CM

## 2024-05-06 DIAGNOSIS — D63.1 ANEMIA IN STAGE 3B CHRONIC KIDNEY DISEASE  (HCC): ICD-10-CM

## 2024-05-06 DIAGNOSIS — N18.32 STAGE 3B CHRONIC KIDNEY DISEASE (HCC): Primary | ICD-10-CM

## 2024-05-06 DIAGNOSIS — N18.32 ANEMIA IN STAGE 3B CHRONIC KIDNEY DISEASE  (HCC): ICD-10-CM

## 2024-05-06 DIAGNOSIS — I12.9 PARENCHYMAL RENAL HYPERTENSION, STAGE 1 THROUGH STAGE 4 OR UNSPECIFIED CHRONIC KIDNEY DISEASE: ICD-10-CM

## 2024-05-06 DIAGNOSIS — E87.1 HYPONATREMIA: ICD-10-CM

## 2024-05-06 DIAGNOSIS — D50.8 OTHER IRON DEFICIENCY ANEMIA: ICD-10-CM

## 2024-05-06 DIAGNOSIS — K70.30 ALCOHOLIC CIRRHOSIS OF LIVER WITHOUT ASCITES (HCC): ICD-10-CM

## 2024-05-06 PROCEDURE — 99214 OFFICE O/P EST MOD 30 MIN: CPT | Performed by: PHYSICIAN ASSISTANT

## 2024-05-06 NOTE — PROGRESS NOTES
Assessment and Plan:    Sia was seen today for follow-up.    Diagnoses and all orders for this visit:    Stage 3b chronic kidney disease (HCC)  -     CBC; Future  -     Comprehensive metabolic panel; Future  -     Magnesium; Future  -     Phosphorus; Future  -     Urinalysis with microscopic; Future  -     Protein / creatinine ratio, urine; Future  -     PTH, intact; Future  -     Vitamin D 25 hydroxy; Future  -     Iron Panel (Includes Ferritin, Iron Sat%, Iron, and TIBC); Future    Anemia in stage 3b chronic kidney disease  (HCC)    Other iron deficiency anemia    Parenchymal renal hypertension, stage 1 through stage 4 or unspecified chronic kidney disease    Hypokalemia    Hyponatremia    Alcoholic cirrhosis of liver without ascites (HCC)    Chronic Kidney Disease stage 3b- Baseline creatinine is 1.3-1.6.  Suspected etiology is due to hepatorenal syndrome in the setting of cirrhosis + high dose diuretics to maintain euvolemia + hypertensive nephrosclerosis.   Creatinine at baseline.  Electrolytes within normal limits.  Minimal proteinuria.    Hypertension- Antihypertensive regimen includes diuretics.  Avoid salt.  Avoid NSAIDs.  Stay active and exercise.      Volume status- She takes torsemide 100mg twice a day and spironolactone 150mg twice a day.  Weight is 168 lbs today but her usual weight is 147-152 lbs.      Anemia, iron deficient- status post iron infusions.  She takes oral iron every other day.  HGB from end of March is at goal.   Iron studies: iron saturation remains low at 18%    Secondary Hyperparathyroidism, renal- Check studies prior to next appointment.    Hypokalemia- She takes micro-k 20meq twice a day.  Postassium level within normal limits.     Hyponatremia- Past workup negative.  Suspected etiology is due to prerenal + cirrhosis.  Continue modest fluid restriction of 1500ml/day (<50oz) + loop diuretics.  Sodium level within normal limits.     Alcoholic Cirrhosis- continue to follow with GI.   She is on the transplant list.  Distended abdomen but not enough fluid to tap per GI.     Follow up with Dr. Huang or an AP in 6 months.  Please call the office with any questions or concerns.      Reason for Visit: Follow-up (CKD.3/)    HPI: Kyleigh Turner is a 53 y.o. female who is here for follow up of chronic kidney disease.  Patient was last seen in November 2023.  Since then, she is getting over bronchitis.  She states her LE edema is worse than normal but is not the worst it has been.  She denies SOB.  She will monitor her weight and states that she feels today's weight on our scale is higher than it actually is.  She was 159 lbs last week at urgent care.  She limits salty foods somewhat but does add salt to her food.  She denies SOB.  She denies dizziness.  She is compliant with her pills.      ROS: A complete review of systems was performed and was negative unless otherwise noted in the history of present illness.    Allergies:   Patient has no known allergies.    Medications:     Current Outpatient Medications:     Cholecalciferol 25 MCG (1000 UT) tablet, Take 1,000 Units by mouth daily, Disp: , Rfl:     ferrous sulfate 325 (65 Fe) mg tablet, Take 325 mg by mouth every other day, Disp: , Rfl:     hydrOXYzine HCL (ATARAX) 50 mg tablet, Take 50 mg by mouth as needed, Disp: , Rfl:     lactulose 20 g/30 mL, Take 45 mL (30 g total) by mouth 3 (three) times a day (Patient taking differently: Take 20 g by mouth 3 (three) times a day), Disp: 1892 mL, Rfl: 2    magnesium Oxide (MAG-OX) 400 mg TABS, Take 400 mg by mouth daily, Disp: , Rfl:     pantoprazole (PROTONIX) 40 mg tablet, Take 40 mg by mouth 2 (two) times a day, Disp: , Rfl:     polyethylene glycol (MIRALAX) 17 g packet, Take 17 g by mouth daily, Disp: , Rfl:     potassium chloride (MICRO-K) 10 MEQ CR capsule, Take 20 mEq by mouth 2 (two) times a day, Disp: , Rfl:     pregabalin (LYRICA) 100 mg capsule, Take 100 mg by mouth 2 (two) times a day, Disp: ,  "Rfl:     rifaximin (XIFAXAN) 550 mg tablet, Take 550 mg by mouth 2 (two) times a day, Disp: , Rfl:     spironolactone (ALDACTONE) 100 mg tablet, Take 1 tablet (100 mg total) by mouth daily (Patient taking differently: Take 150 mg by mouth 2 (two) times a day), Disp: 30 tablet, Rfl: 1    torsemide (DEMADEX) 20 mg tablet, Take 5 tablets (100 mg total) by mouth 2 (two) times a day, Disp: 900 tablet, Rfl: 3    traZODone (DESYREL) 50 mg tablet, Take 50 mg by mouth daily at bedtime, Disp: , Rfl:     vitamin E, tocopherol, 200 units capsule, Take 200 Units by mouth daily, Disp: , Rfl:     acetaminophen (TYLENOL) 325 mg tablet, Take 650 mg by mouth as needed for mild pain, Disp: , Rfl:     cyanocobalamin (VITAMIN B-12) 1000 MCG tablet, Take 1,000 mcg by mouth daily (Patient not taking: Reported on 11/1/2023), Disp: , Rfl:     Ferrous Sulfate (IRON PO), Take by mouth 3 (three) times a week, Disp: , Rfl:     methocarbamol (ROBAXIN) 500 mg tablet, Take 750 mg by mouth daily at bedtime as needed for muscle spasms  (Patient not taking: Reported on 5/12/2023), Disp: , Rfl:     SALINE NASAL SPRAY NA, into each nostril (Patient not taking: Reported on 5/12/2023), Disp: , Rfl:     Past Medical History:   Diagnosis Date    Alcohol abuse     Cirrhosis (HCC)     Hypertension      Past Surgical History:   Procedure Laterality Date    IR PARACENTESIS  3/8/2021     History reviewed. No pertinent family history.   reports that she quit smoking about 22 years ago. Her smoking use included cigarettes. She has never used smokeless tobacco. She reports that she does not currently use alcohol. She reports that she does not use drugs.    Physical Exam:   Vitals:    05/06/24 0827 05/06/24 0849   BP: 118/80 122/56   BP Location: Left arm Left arm   Patient Position: Sitting Sitting   Cuff Size: Standard Standard   Pulse: 76 72   SpO2: 97%    Weight: 76.2 kg (168 lb)    Height: 5' 4\" (1.626 m)      Body mass index is 28.84 kg/m².    General: " NAD  Neuro: AAO  Skin: no rash  Eyes: anicteric  ENMT: mm moist  Neck: no masses  Respiratory: CTAB  Cardiovascular: RRR  Extremities: + trace LE edema  Gastrointestinal: distended, slightly tense, slightly tender     Procedure:  No results found for this or any previous visit.    Lab Results   Component Value Date    CALCIUM 8.4 (L) 05/01/2024    K 3.9 05/01/2024    CO2 26 05/01/2024     05/01/2024    BUN 29 (H) 05/01/2024    CREATININE 1.33 (H) 05/01/2024       Results from last 7 days   Lab Units 05/01/24  0928   WHITE BLOOD CELL COUNT. thou/cmm 4.1   HEMOGLOBIN. g/dL 12.2   HEMATOCRIT. % 33.7*   PLATELETS. thou/cmm 40*   POTASSIUM mmol/L 3.9   CHLORIDE mmol/L 103   CO2 mmol/L 26   BUN mg/dL 29*   CREATININE mg/dL 1.33*   CALCIUM mg/dL 8.4*   MAGNESIUM mg/dL 1.8     I have personally reviewed the blood work as stated above and in my note.  I have personally reviewed last renal note.

## 2024-05-06 NOTE — PATIENT INSTRUCTIONS
Chronic Kidney Disease stage 3b- Baseline creatinine is 1.3-1.6.  Suspected etiology is due to hepatorenal syndrome in the setting of cirrhosis + high dose diuretics to maintain euvolemia + hypertensive nephrosclerosis.   Creatinine at baseline.  Electrolytes within normal limits.  Minimal proteinuria.     Hypertension- Antihypertensive regimen includes diuretics.  Avoid salt.  Avoid NSAIDs.  Stay active and exercise.      Volume status- She takes torsemide 100mg twice a day and spironolactone 150mg twice a day.  Weight is 168 lbs today but her usual weight is 147-152 lbs.      Anemia, iron deficient- status post iron infusions.  She takes oral iron every other day.  HGB from end of March is at goal.   Iron studies: iron saturation remains low at 18%    Secondary Hyperparathyroidism, renal- Check studies prior to next appointment.    Hypokalemia- She takes micro-k 20meq twice a day.  Postassium level within normal limits.     Hyponatremia- Past workup negative.  Suspected etiology is due to prerenal + cirrhosis.  Continue modest fluid restriction of 1500ml/day (<50oz) + loop diuretics.  Sodium level within normal limits.     Alcoholic Cirrhosis- continue to follow with GI.  She is on the transplant list.  Distended abdomen but not enough fluid to tap per GI.     Follow up with Dr. Huang or an AP in 6 months.  Please call the office with any questions or concerns.

## 2024-07-24 ENCOUNTER — TELEPHONE (OUTPATIENT)
Dept: OTHER | Facility: HOSPITAL | Age: 54
End: 2024-07-24

## 2024-07-24 ENCOUNTER — NURSE TRIAGE (OUTPATIENT)
Age: 54
End: 2024-07-24

## 2024-07-24 DIAGNOSIS — R60.0 LOCALIZED EDEMA: Primary | ICD-10-CM

## 2024-07-24 DIAGNOSIS — E87.1 HYPONATREMIA: ICD-10-CM

## 2024-07-24 DIAGNOSIS — N18.32 STAGE 3B CHRONIC KIDNEY DISEASE (HCC): Primary | ICD-10-CM

## 2024-07-24 NOTE — TELEPHONE ENCOUNTER
"Patient calling with severe swelling. She said she had her gall bladder removed on 7/17, and has had swelling since 7/18. She said it usually goes down, but it is not this time. She said the swelling is mainly in her feet and legs, but stated she feels swollen in her abdomen where the surgery was done. She is on 200mg of Torsemide daily and 150mg of spironolactone daily. She was in the Emergency room for 2 days and discharged 7/23 for the edema. She said they gave her 100mg of lasix through the IV and it didn't do much. She refuses to go back to the emergency room. I would like her to be seen if the office within the next 3 days if possible as a hospital follow up. She did not go to a [facility] as I don't see the report in our system. She said the swelling Is a 6/10, and she denies chest pain or shortness of breath. She wants to know what the provider can recommend for her to decrease the swelling. She is on a fluid restriction as well as daily lasix. On call provider contacted.       Reason for Disposition   MILD swelling of both ankles (i.e., pedal edema) AND new-onset or worsening    Answer Assessment - Initial Assessment Questions  1. ONSET: \"When did the swelling start?\" (e.g., minutes, hours, days)      7/18  2. LOCATION: \"What part of the leg is swollen?\"  \"Are both legs swollen or just one leg?\"      Legs, feet  3. SEVERITY: \"How bad is the swelling?\" (e.g., localized; mild, moderate, severe)   - Localized - small area of swelling localized to one leg   - MILD pedal edema - swelling limited to foot and ankle, pitting edema < 1/4 inch (6 mm) deep, rest and elevation eliminate most or all swelling   - MODERATE edema - swelling of lower leg to knee, pitting edema > 1/4 inch (6 mm) deep, rest and elevation only partially reduce swelling   - SEVERE edema - swelling extends above knee, facial or hand swelling present       Severe   4. REDNESS: \"Does the swelling look red or infected?\"      Denies   5. " "PAIN: \"Is the swelling painful to touch?\" If Yes, ask: \"How painful is it?\"   (Scale 1-10; mild, moderate or severe)      6/10   6. FEVER: \"Do you have a fever?\" If Yes, ask: \"What is it, how was it measured, and when did it start?\"       Denies   7. CAUSE: \"What do you think is causing the leg swelling?\"      Had surgery on 7/17  8. MEDICAL HISTORY: \"Do you have a history of heart failure, kidney disease, liver failure, or cancer?\"      Kidney disease   9. RECURRENT SYMPTOM: \"Have you had leg swelling before?\" If Yes, ask: \"When was the last time?\" \"What happened that time?\"      Yes, but usually goes down   10. OTHER SYMPTOMS: \"Do you have any other symptoms?\" (e.g., chest pain, difficulty breathing)        Denies    Protocols used: Leg Swelling and Edema-ADULT-[place]    "

## 2024-07-25 DIAGNOSIS — R60.0 LOCALIZED EDEMA: Primary | ICD-10-CM

## 2024-07-25 RX ORDER — METOLAZONE 5 MG/1
5 TABLET ORAL DAILY
Qty: 3 TABLET | Refills: 0 | Status: SHIPPED | OUTPATIENT
Start: 2024-07-25 | End: 2024-07-25

## 2024-07-25 RX ORDER — METOLAZONE 5 MG/1
5 TABLET ORAL DAILY
Qty: 3 TABLET | Refills: 0 | Status: SHIPPED | OUTPATIENT
Start: 2024-07-25

## 2024-07-25 RX ORDER — METOLAZONE 5 MG/1
5 TABLET ORAL DAILY
COMMUNITY
End: 2024-07-25 | Stop reason: SDUPTHER

## 2024-07-25 NOTE — TELEPHONE ENCOUNTER
I called the patient and left a message on voicemail.   Basing on the information available to me, I recommended that she go to the ER because it appears that she needs IV diuretics.   Since there is documentation that she is refusing to go to the ER, can try Metolazone 5 mg PO daily x 3 days.   I tried prescribing Metolazone but patient does not have a pharmacy listed and I am unable to prescribe this at this time.

## 2024-07-25 NOTE — TELEPHONE ENCOUNTER
Patient calling again to see if rx was called in. Made aware message was sent to provider.   Patient verbalized understanding.

## 2024-07-29 ENCOUNTER — TELEPHONE (OUTPATIENT)
Dept: NEPHROLOGY | Facility: CLINIC | Age: 54
End: 2024-07-29

## 2024-07-29 LAB
ANION GAP SERPL CALCULATED.3IONS-SCNC: 15 MMOL/L (ref 3–11)
BUN SERPL-MCNC: 42 MG/DL (ref 7–25)
CALCIUM SERPL-MCNC: 11.7 MG/DL (ref 8.5–10.1)
CHLORIDE SERPL-SCNC: 89 MMOL/L (ref 100–109)
CO2 SERPL-SCNC: 24 MMOL/L (ref 21–31)
CREAT SERPL-MCNC: 1.48 MG/DL (ref 0.4–1.1)
CYTOLOGY CMNT CVX/VAG CYTO-IMP: ABNORMAL
GFR/BSA.PRED SERPLBLD CYS-BASED-ARV: 42 ML/MIN/{1.73_M2}
GLUCOSE SERPL-MCNC: 115 MG/DL (ref 65–99)
MAGNESIUM SERPL-MCNC: 1.8 MG/DL (ref 1.4–2.2)
POTASSIUM SERPL-SCNC: 3.6 MMOL/L (ref 3.5–5.2)
SODIUM SERPL-SCNC: 128 MMOL/L (ref 135–145)

## 2024-07-29 NOTE — TELEPHONE ENCOUNTER
LM for patient about the following, asked her to call back if she has any questions:    ----- Message from Grant Huang MD sent at 7/29/2024  2:55 PM EDT -----  She definitely should go back to the emergency room her calcium is dramatically rising and potentially dangerous along with her creatinine not safe to treat this as an outpatient given recent admission  She needs to have it evaluated and treated may need to be kept overnight with great great apologies  ----- Message -----  From: Deepa Cabrera Orders/Results  Sent: 7/29/2024   2:49 PM EDT  To: Grant Huang MD

## 2024-07-29 NOTE — TELEPHONE ENCOUNTER
Patient returned call and she is going to go to the emergency room to be evaluated. She is an [facility] patient and will be going to one of their hospitals.

## 2024-07-31 ENCOUNTER — TELEPHONE (OUTPATIENT)
Age: 54
End: 2024-07-31

## 2024-07-31 ENCOUNTER — DOCUMENTATION (OUTPATIENT)
Dept: NEPHROLOGY | Facility: CLINIC | Age: 54
End: 2024-07-31

## 2024-07-31 NOTE — PROGRESS NOTES
I spoke with Dr. Kvng Pedro Giltner kidney specialist.  He is taking care of her.  She had acute kidney injury severely dehydrated associated with hypercalcemia he felt from dehydration and hyponatremia as well  They are stopping metolazone which is what precipitated the problem and adjusting her diuretics to decrease the dehydration.  She will be discharged in the next 2 to 3 days.

## 2024-07-31 NOTE — TELEPHONE ENCOUNTER
Patient called refill line, she is admitted to [facility] for abnormal blood work.she is calling to let her nephrologist know. Please advise.

## 2024-08-01 ENCOUNTER — DOCUMENTATION (OUTPATIENT)
Dept: NEPHROLOGY | Facility: CLINIC | Age: 54
End: 2024-08-01

## 2024-08-01 DIAGNOSIS — E83.52 HYPERCALCEMIA: ICD-10-CM

## 2024-08-01 DIAGNOSIS — N18.32 STAGE 3B CHRONIC KIDNEY DISEASE (HCC): Primary | ICD-10-CM

## 2024-08-01 NOTE — PROGRESS NOTES
Discussed with Dr. Benavides the nephrologist  Patient was admitted to [facility] and being discharged 8/1/2024  1.  VANESA resolved creatinine back to 1.2  2.  Sodium 129 it was lower  3.  Calcium related to severe dehydration now resolved  Apparently discharged on:  - Spironolactone 50 mg twice a day  - Torsemide 60 mg twice a day  - Fluid restriction    Please order basic metabolic profile/ionized calcium/magnesium/phosphorus/PTH intact level 1 to 2 weeks postdischarge  Follow-up in the office in the next few weeks with one of the advanced practitioners

## 2024-08-02 ENCOUNTER — TELEPHONE (OUTPATIENT)
Dept: NEPHROLOGY | Facility: CLINIC | Age: 54
End: 2024-08-02

## 2024-08-02 NOTE — TELEPHONE ENCOUNTER
LM for pt to schedule follow up appointment per Dr. Huang with himself or AP in the next several weeks. Currently, first available is for 9/27 at 11:00 with Leslee. Please place patient on high priority wait list.

## 2024-08-08 NOTE — TELEPHONE ENCOUNTER
Patient called- she wanted to verify that labs were ordered for her for Dr. Huang.    I faxed them to [facility] in [place] for her.    She also would like a call back to discuss the appointment for 9/27 because I do not see this available anymore. Please advise.

## 2024-08-15 ENCOUNTER — TELEPHONE (OUTPATIENT)
Age: 54
End: 2024-08-15

## 2024-08-15 DIAGNOSIS — N18.32 STAGE 3B CHRONIC KIDNEY DISEASE (HCC): Primary | ICD-10-CM

## 2024-08-15 DIAGNOSIS — E87.1 HYPONATREMIA: ICD-10-CM

## 2024-08-15 LAB
CA-I BLD-SCNC: 1.11 MMOL/L
EXT GLUCOSE BLD: 127
EXTERNAL BUN: 30
EXTERNAL CALCIUM: 8.5
EXTERNAL CHLORIDE: 98
EXTERNAL CO2: 24
EXTERNAL CREATININE: 1.07
EXTERNAL EGFR: 62
EXTERNAL MAGNESIUM: 1.7
EXTERNAL PHOSPHORUS: 3.1
EXTERNAL POTASSIUM: 4.4
EXTERNAL PTH: 46.8
EXTERNAL SODIUM: 130

## 2024-08-15 NOTE — TELEPHONE ENCOUNTER
Patient called in states that she had labs done at [facility] on 8/13, asks for the results.  No results found in chart yet.  Refreshed care everywhere, but not seeing results yet.  Please advise and call patient when results are in.

## 2024-08-15 NOTE — TELEPHONE ENCOUNTER
LM for patient about the following, asked her to call back if she has any questions.  Lab scripts mailed out to patient:    Grant Huang MD  [facility] [place] [facility]  Labs look great!  Repeat a BMP and magnesium in about 6 weeks

## 2024-08-20 ENCOUNTER — TELEPHONE (OUTPATIENT)
Dept: NEPHROLOGY | Facility: CLINIC | Age: 54
End: 2024-08-20

## 2024-08-20 NOTE — TELEPHONE ENCOUNTER
Called patient and left a voicemail going over the following information:    Labs are stable just repeat a basic metabolic profile on modest fluid restriction 1500 mL or 48 ounces no other changes this BMP should be over the next few weeks.    I asked the patient please call back with further questions.

## 2024-08-20 NOTE — TELEPHONE ENCOUNTER
----- Message from Grant Huang MD sent at 8/20/2024  6:19 AM EDT -----  Labs are stable just repeat a basic metabolic profile on modest fluid restriction 1500 mL or 48 ounces no other changes this BMP should be over the next few weeks  ----- Message -----  From: Interface, Transcription Incoming  Sent: 8/16/2024  12:31 AM EDT  To: Grant Huang MD
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
## 2024-08-28 DIAGNOSIS — N18.32 STAGE 3B CHRONIC KIDNEY DISEASE (HCC): ICD-10-CM

## 2024-08-28 RX ORDER — TORSEMIDE 20 MG/1
100 TABLET ORAL 2 TIMES DAILY
Qty: 300 TABLET | Refills: 5 | Status: SHIPPED | OUTPATIENT
Start: 2024-08-28

## 2024-09-03 ENCOUNTER — TELEPHONE (OUTPATIENT)
Age: 54
End: 2024-09-03

## 2024-09-03 NOTE — TELEPHONE ENCOUNTER
Patient called in states that she has appointment tomorrow and asks does she need lab work, she said she does not have lab slips.  Said she did have labs 8/20.  Please advise if she needs any labs and call her.

## 2024-09-03 NOTE — TELEPHONE ENCOUNTER
FIDEL for patient letting her know that she does not need to go for more lab work before her appt on 9/4.  Asked her to call back if she has any questions.

## 2024-09-04 ENCOUNTER — OFFICE VISIT (OUTPATIENT)
Dept: NEPHROLOGY | Facility: CLINIC | Age: 54
End: 2024-09-04
Payer: MEDICARE

## 2024-09-04 VITALS — HEIGHT: 64 IN | BODY MASS INDEX: 28.84 KG/M2

## 2024-09-04 DIAGNOSIS — N18.32 STAGE 3B CHRONIC KIDNEY DISEASE (HCC): ICD-10-CM

## 2024-09-04 DIAGNOSIS — E87.6 HYPOKALEMIA: ICD-10-CM

## 2024-09-04 DIAGNOSIS — D63.1 ANEMIA IN STAGE 3B CHRONIC KIDNEY DISEASE  (HCC): ICD-10-CM

## 2024-09-04 DIAGNOSIS — N18.32 ANEMIA IN STAGE 3B CHRONIC KIDNEY DISEASE  (HCC): ICD-10-CM

## 2024-09-04 DIAGNOSIS — E87.1 HYPONATREMIA: ICD-10-CM

## 2024-09-04 DIAGNOSIS — D50.8 OTHER IRON DEFICIENCY ANEMIA: ICD-10-CM

## 2024-09-04 DIAGNOSIS — I12.9 PARENCHYMAL RENAL HYPERTENSION, STAGE 1 THROUGH STAGE 4 OR UNSPECIFIED CHRONIC KIDNEY DISEASE: Primary | ICD-10-CM

## 2024-09-04 PROCEDURE — 99214 OFFICE O/P EST MOD 30 MIN: CPT | Performed by: INTERNAL MEDICINE
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
## 2024-09-04 NOTE — PATIENT INSTRUCTIONS
Visit summary:  - Overall kidney function has returned to baseline and labs in general look good although we are going to recheck them now that you are back on a steady regimen as you had been in the past.        1. Medication changes today:  No medication changes pending your follow-up labs and home blood pressure readings    2.  General instructions:  Continue modest fluid restriction about 1500 mL or 48 ounces a day  Use Gatorade or Powerade or electrolyte solutions if you find your cramping or you need fluids    3.  Please go for non fasting  lab work at this time!    4.  Please take 1 week a blood pressure readings at this time sitting and standing with heart rate    AS FOLLOWS  MORNING AND EVENING, SITTING AND STANDING as follows:  TAKE THE MORNING READINGS BEFORE ANY MEDICATIONS AND WHEN YOU ARE RELAXED FOR SEVERAL MINUTES  TAKE THE EVENING READINGS:  BETWEEN 7-10 P.M.; PRIOR TO ANY MEDICATIONS; AT LEAST IN OUR  FROM DINNER; AND CERTAINLY AFTER RELAXING FOR A FEW MINUTES  PLEASE INCLUDE HEART RATE WITH YOUR BLOOD PRESSURE READINGS  When taking standing readings, keep your arm supported at heart level and not dangling  Make sure you are sitting with your back supported and feet on the ground and do not cross your legs or feet  Make sure you have not taken any coffee or caffeine products or exercised or smoke cigarettes at least 30 minutes before taking your blood pressure  Then please mail these readings into the office      5.  In 3 months:  Please go for nonfasting lab work but in the morning  Please take 1 week a blood pressure readings as outlined above and mail those into the office      6.  Follow-up in 6 months  Please bring in 1 week a blood pressure readings morning evening, sitting and standing is outlined above  PLEASE BRING AN YOUR BLOOD PRESSURE MACHINE TO CORRELATE WITH THE OFFICE MACHINE AT THIS NEXT SCHEDULED VISIT  Please go for fasting lab work 1-2 weeks prior to your  appointment      7.  General non medical recommendations:  AVOID SALT BUT NOT ADDING AN READING LABELS TO MAKE SURE THERE IS LOW-SALT IN THE FOOD THAT YOU ARE EATING  Goal is less than 2 g of sodium intake or less than 5 g of sodium chloride intake per day    Avoid nonsteroidal anti-inflammatory drugs such as Naprosyn, ibuprofen, Aleve, Advil, Celebrex, Meloxicam (Mobic) etc.  You can use Tylenol as needed if you do not have any liver condition to be concerned about    Avoid medications such as Sudafed or decongestants and antihistamines that contained the D component which is the decongestant.  You can take antihistamines without the decongestant or D component.    Try to avoid medications such as pantoprazole or  Protonix/Nexium or Esomeprazole)/Prilosec or omeprazole/Prevacid or lansoprazole/AcipHex or Rabeprazole.  If you are able to, use Pepcid as this is safer for your kidneys.    Try to exercise at least 30 minutes 3 days a week to begin with with an ultimate goal of 5 days a week for at least 30 minutes    Please do not drink more than 2 glasses of alcohol/wine on a daily basis as this may contribute to your high blood pressure.

## 2024-09-06 LAB
ANION GAP SERPL CALCULATED.3IONS-SCNC: 8 MMOL/L (ref 3–11)
BUN SERPL-MCNC: 24 MG/DL (ref 7–25)
CALCIUM SERPL-MCNC: 9.4 MG/DL (ref 8.5–10.1)
CHLORIDE SERPL-SCNC: 98 MMOL/L (ref 100–109)
CO2 SERPL-SCNC: 26 MMOL/L (ref 21–31)
CREAT SERPL-MCNC: 1.09 MG/DL (ref 0.4–1.1)
CYTOLOGY CMNT CVX/VAG CYTO-IMP: ABNORMAL
GFR/BSA.PRED SERPLBLD CYS-BASED-ARV: 61 ML/MIN/{1.73_M2}
GLUCOSE SERPL-MCNC: 105 MG/DL (ref 65–99)
MAGNESIUM SERPL-MCNC: 1.8 MG/DL (ref 1.4–2.2)
POTASSIUM SERPL-SCNC: 4 MMOL/L (ref 3.5–5.2)
SODIUM SERPL-SCNC: 132 MMOL/L (ref 135–145)

## 2024-09-09 ENCOUNTER — TELEPHONE (OUTPATIENT)
Age: 54
End: 2024-09-09

## 2024-09-09 DIAGNOSIS — K70.30 ALCOHOLIC CIRRHOSIS OF LIVER WITHOUT ASCITES (HCC): ICD-10-CM

## 2024-09-09 RX ORDER — SPIRONOLACTONE 100 MG/1
150 TABLET, FILM COATED ORAL 2 TIMES DAILY
Qty: 270 TABLET | Refills: 3 | Status: SHIPPED | OUTPATIENT
Start: 2024-09-09

## 2024-09-09 NOTE — TELEPHONE ENCOUNTER
Patient asking for refill of  spironolactone 150 mg bid- 3 mth supply. Rx in chart is from 2021. Please advise, thank you.

## 2024-09-10 ENCOUNTER — TELEPHONE (OUTPATIENT)
Dept: NEPHROLOGY | Facility: CLINIC | Age: 54
End: 2024-09-10

## 2024-09-10 DIAGNOSIS — N18.32 STAGE 3B CHRONIC KIDNEY DISEASE (HCC): Primary | ICD-10-CM

## 2024-09-10 NOTE — TELEPHONE ENCOUNTER
----- Message from Grant Huang MD sent at 9/10/2024  6:46 AM EDT -----  Kidney function looks good sodium slightly low continue fluid restriction along with the diuretics  Repeat a basic metabolic profile in 6 to 8 weeks  ----- Message -----  From: Deepa Cabrera Orders/Results  Sent: 9/6/2024   1:38 PM EDT  To: Grant Huang MD

## 2024-10-29 ENCOUNTER — TELEPHONE (OUTPATIENT)
Age: 54
End: 2024-10-29

## 2024-10-29 ENCOUNTER — TELEPHONE (OUTPATIENT)
Dept: NEPHROLOGY | Facility: CLINIC | Age: 54
End: 2024-10-29

## 2024-10-29 DIAGNOSIS — E87.1 HYPONATREMIA: ICD-10-CM

## 2024-10-29 DIAGNOSIS — N18.32 STAGE 3B CHRONIC KIDNEY DISEASE (HCC): Primary | ICD-10-CM

## 2024-10-29 NOTE — TELEPHONE ENCOUNTER
LM for pt to schedule march f/u with Dr. Huang or AP. Please schedule with first available and place on waitlist

## 2024-10-29 NOTE — TELEPHONE ENCOUNTER
Spoke with patient about the following, she expressed understanding and thanked us for the call:    Lets get a BMP and a magnesium in about 1 week and then we can determine urgency of the appointment

## 2024-10-29 NOTE — TELEPHONE ENCOUNTER
Patient called to schedule her march follow up. She mentioned she was recently in [facility]. She wanted to know if she should be seen sooner. Please follow up with patient. Thank you

## 2024-11-01 ENCOUNTER — NURSE TRIAGE (OUTPATIENT)
Age: 54
End: 2024-11-01

## 2024-11-01 DIAGNOSIS — R60.0 LOCALIZED EDEMA: ICD-10-CM

## 2024-11-01 RX ORDER — METOLAZONE 5 MG/1
TABLET ORAL
Qty: 15 TABLET | Refills: 0 | Status: SHIPPED | OUTPATIENT
Start: 2024-11-01

## 2024-11-01 NOTE — TELEPHONE ENCOUNTER
Pt would like to try Metolazone before going to the ER.  She was just in the hospital recently and is trying to avoid to go back.  She has two fractured vertebrae in her back as well.  Stomach very distended per patient.  If Metolazone does not help with the fluid, she will present herself to ER.

## 2024-11-01 NOTE — TELEPHONE ENCOUNTER
"patient called stating she was in the hospital the past 2 weeks related to fractures in her back and being in pain. She said she has edema up into her abdomen, she said her feet are shiny from the swelling. She takes 150mg of spironolactone 2x a day, and 100mg 2x a day of torsemide. She said the pain is 8/10 in her legs and feet. She is short of breath I recommended the ER due to the severity of the swelling but patient refused. She wants to know if the doctor can prescribe another diuretic. Please advise.        Regarding: Swelling  ----- Message from Marilyn ENCARNACION sent at 11/1/2024 10:08 AM EDT -----  Patient called she  was in the ER   swelling  in both  legs .  Reason for Disposition   Difficulty breathing at rest    Answer Assessment - Initial Assessment Questions  1. ONSET: \"When did the swelling start?\" (e.g., minutes, hours, days)      Past week  2. LOCATION: \"What part of the leg is swollen?\"  \"Are both legs swollen or just one leg?\"      Feet, legs, abdomen  3. SEVERITY: \"How bad is the swelling?\" (e.g., localized; mild, moderate, severe)      severe  4. REDNESS: \"Does the swelling look red or infected?\"      denies  5. PAIN: \"Is the swelling painful to touch?\" If Yes, ask: \"How painful is it?\"   (Scale 1-10; mild, moderate or severe)      Yes 8/10  6. FEVER: \"Do you have a fever?\" If Yes, ask: \"What is it, how was it measured, and when did it start?\"       denies  7. CAUSE: \"What do you think is causing the leg swelling?\"      unsure  8. MEDICAL HISTORY: \"Do you have a history of blood clots (e.g., DVT), cancer, heart failure, kidney disease, or liver failure?\"      Kidney disease  9. RECURRENT SYMPTOM: \"Have you had leg swelling before?\" If Yes, ask: \"When was the last time?\" \"What happened that time?\"      Gave another diuretic   10. OTHER SYMPTOMS: \"Do you have any other symptoms?\" (e.g., chest pain, difficulty breathing)        Shortness of breath    Protocols used: Leg Swelling and Edema-Adult-[place]    "

## 2024-11-01 NOTE — TELEPHONE ENCOUNTER
Regarding: Swelling  ----- Message from Marilyn ENCARNACION sent at 11/1/2024 10:08 AM EDT -----  Patient called she  was in the ER   swelling  in both  legs .

## 2024-11-01 NOTE — TELEPHONE ENCOUNTER
Message left on patient's VM that if Metolazone does  not help, she needs to go to the ER per Dr. Huang.  LETY, mag in one week.  Pt to call the office next week (if she's not in the hospital) and let us know how she is doing.

## 2024-11-01 NOTE — TELEPHONE ENCOUNTER
"Patient called stating she was in the hospital the past 2 weeks related to fractures in her back and being in pain. She said she has edema up into her abdomen, she said her feet are shiny from the swelling. She said she takes 150mg 2x a daiy of spironolactone and 100mg 2x a day of torsemide.      Reason for Disposition   Difficulty breathing at rest    Answer Assessment - Initial Assessment Questions  1. ONSET: \"When did the swelling start?\" (e.g., minutes, hours, days)      Started last week  2. LOCATION: \"What part of the leg is swollen?\"  \"Are both legs swollen or just one leg?\"      Feet, legs, abdomen  3. SEVERITY: \"How bad is the swelling?\" (e.g., localized; mild, moderate, severe)      severe  4. REDNESS: \"Does the swelling look red or infected?\"      Looks shiny  5. PAIN: \"Is the swelling painful to touch?\" If Yes, ask: \"How painful is it?\"   (Scale 1-10; mild, moderate or severe)      Yes 8/10  6. FEVER: \"Do you have a fever?\" If Yes, ask: \"What is it, how was it measured, and when did it start?\"       denies  7. CAUSE: \"What do you think is causing the leg swelling?\"      unsure  8. MEDICAL HISTORY: \"Do you have a history of blood clots (e.g., DVT), cancer, heart failure, kidney disease, or liver failure?\"      Kidney disease   9. RECURRENT SYMPTOM: \"Have you had leg swelling before?\" If Yes, ask: \"When was the last time?\" \"What happened that time?\"      Yes, another medication was added  10. OTHER SYMPTOMS: \"Do you have any other symptoms?\" (e.g., chest pain, difficulty breathing)        Yes, last night    Protocols used: Leg Swelling and Edema-Adult-[place]    "

## 2024-11-22 ENCOUNTER — TELEPHONE (OUTPATIENT)
Age: 54
End: 2024-11-22

## 2024-11-22 DIAGNOSIS — N18.32 STAGE 3B CHRONIC KIDNEY DISEASE (HCC): Primary | ICD-10-CM

## 2024-11-22 NOTE — TELEPHONE ENCOUNTER
Patient wanted to let Dr. Ann know that she has been getting lab work done but it was in the hospital.    She was recently admitted for her back, is out currently and in a rehabilitation facility.    Patient would like if someone could reach out to her via [employer] to let her know what fluid restrictions she has and if it could be raised up a little.    Please advise.

## 2025-01-03 ENCOUNTER — TELEPHONE (OUTPATIENT)
Dept: NEPHROLOGY | Facility: CLINIC | Age: 55
End: 2025-01-03

## 2025-01-03 NOTE — TELEPHONE ENCOUNTER
Left msg with pt to call back if she wanted to move up her April appt with Dr. Huang to an opening in March

## 2025-01-17 ENCOUNTER — TELEPHONE (OUTPATIENT)
Dept: NEPHROLOGY | Facility: CLINIC | Age: 55
End: 2025-01-17

## 2025-01-17 NOTE — TELEPHONE ENCOUNTER
Left msg with pt to call back if she wanted to move her April appt with Dr. Huang to an available opening in March.

## 2025-01-29 ENCOUNTER — TELEPHONE (OUTPATIENT)
Age: 55
End: 2025-01-29

## 2025-01-29 DIAGNOSIS — N18.32 STAGE 3B CHRONIC KIDNEY DISEASE (HCC): Primary | ICD-10-CM

## 2025-01-29 DIAGNOSIS — K70.30 ALCOHOLIC CIRRHOSIS OF LIVER WITHOUT ASCITES (HCC): ICD-10-CM

## 2025-01-29 LAB
ANION GAP SERPL CALCULATED.3IONS-SCNC: 10 MMOL/L (ref 3–11)
BUN SERPL-MCNC: 45 MG/DL (ref 7–25)
CALCIUM SERPL-MCNC: 8.8 MG/DL (ref 8.5–10.5)
CHLORIDE SERPL-SCNC: 98 MMOL/L (ref 100–109)
CO2 SERPL-SCNC: 28 MMOL/L (ref 21–31)
CREAT SERPL-MCNC: 1.15 MG/DL (ref 0.4–1.1)
CYTOLOGY CMNT CVX/VAG CYTO-IMP: ABNORMAL
GFR/BSA.PRED SERPLBLD CYS-BASED-ARV: 57 ML/MIN/{1.73_M2}
GLUCOSE SERPL-MCNC: 179 MG/DL (ref 65–99)
MAGNESIUM SERPL-MCNC: 1.7 MG/DL (ref 1.4–2.2)
POTASSIUM SERPL-SCNC: 3.2 MMOL/L (ref 3.5–5.2)
SODIUM SERPL-SCNC: 136 MMOL/L (ref 135–145)

## 2025-01-29 NOTE — TELEPHONE ENCOUNTER
Spoke with patient about the following, she expressed understanding and asked the orders be faxed to [facility] in [place].  Lab orders faxed.  Patient will misa if she gets to the lab and they do not have the orders:    Need a basic metabolic profile magnesium as soon as possible and then I can restart this I just want to make sure labs are stable to do so

## 2025-01-29 NOTE — TELEPHONE ENCOUNTER
Patient called stating she was in the hospital in December. At some point her Spironolactone was discontinued. She spoke with her PCP who told her to reach out to Dr. Huang and ask if she should start this back up. Patient states she does not want to go without it. If its okay for her to start taking again, please send to the [facility] in [place].  Previous dose was Spironolactone 100 mg 1.5 tablets twice daily.  Please advise

## 2025-01-30 ENCOUNTER — RESULTS FOLLOW-UP (OUTPATIENT)
Dept: NEPHROLOGY | Facility: CLINIC | Age: 55
End: 2025-01-30

## 2025-01-30 DIAGNOSIS — D63.1 ANEMIA IN STAGE 3B CHRONIC KIDNEY DISEASE  (HCC): ICD-10-CM

## 2025-01-30 DIAGNOSIS — N18.32 ANEMIA IN STAGE 3B CHRONIC KIDNEY DISEASE  (HCC): ICD-10-CM

## 2025-01-30 DIAGNOSIS — N18.32 STAGE 3B CHRONIC KIDNEY DISEASE (HCC): Primary | ICD-10-CM

## 2025-01-30 DIAGNOSIS — I12.9 PARENCHYMAL RENAL HYPERTENSION, STAGE 1 THROUGH STAGE 4 OR UNSPECIFIED CHRONIC KIDNEY DISEASE: ICD-10-CM

## 2025-01-30 DIAGNOSIS — K70.30 ALCOHOLIC CIRRHOSIS OF LIVER WITHOUT ASCITES (HCC): ICD-10-CM

## 2025-01-30 DIAGNOSIS — E87.6 HYPOKALEMIA: ICD-10-CM

## 2025-01-30 RX ORDER — SPIRONOLACTONE 50 MG/1
150 TABLET, FILM COATED ORAL 2 TIMES DAILY
Qty: 540 TABLET | Refills: 3 | Status: CANCELLED | OUTPATIENT
Start: 2025-01-30

## 2025-01-30 RX ORDER — SPIRONOLACTONE 50 MG/1
150 TABLET, FILM COATED ORAL 2 TIMES DAILY
Qty: 540 TABLET | Refills: 3 | Status: SHIPPED | OUTPATIENT
Start: 2025-01-30

## 2025-01-30 NOTE — TELEPHONE ENCOUNTER
Called patient and went over the following information:    I am okay to resume spironolactone verify the dose and we can do the prescription  High potassium diet  BMP 1 week after initiation of spironolactone  She will need a follow-up appointment with one of the advanced practitioners or myself in the next several weeks nothing urgent.    Patient verbally understood and had no further questions for me at this time.    Labs have been added to the patients chart, patient also has an appointment in April with Dr. Huang she would like to keep as she stated she has a lot going on until then and would also only prefer to see Dr. Huang not an AP.     Patient would also like if a new script for spironolactone 50 mg tabs could be called in to the [facility] in [place] as she did not like the 100 mg having to be cut in half to make 150mg twice daily.

## 2025-01-30 NOTE — TELEPHONE ENCOUNTER
----- Message from Grant Huang MD sent at 1/30/2025  7:20 AM EST -----  I am okay to resume spironolactone verify the dose and we can do the prescription  High potassium diet  BMP 1 week after initiation of spironolactone  She will need a follow-up appointment with one of the advanced practitioners or myself in the next several weeks nothing urgent  ----- Message -----  From: Deepa Cabrera Orders/Results  Sent: 1/29/2025   8:25 PM EST  To: Grant Huang MD

## 2025-02-04 ENCOUNTER — TELEPHONE (OUTPATIENT)
Age: 55
End: 2025-02-04

## 2025-02-04 DIAGNOSIS — R60.0 LOCALIZED EDEMA: ICD-10-CM

## 2025-02-04 RX ORDER — METOLAZONE 5 MG/1
TABLET ORAL
Qty: 24 TABLET | Refills: 1 | Status: SHIPPED | OUTPATIENT
Start: 2025-02-04

## 2025-02-04 NOTE — TELEPHONE ENCOUNTER
Spoke with patient about the following, she expressed understanding and thanked us for the call:    She can only take 1 or 2 at most during the course of the week and regarding metolazone but first I would recommend taking the torsemide and the spironolactone before she does this you can prescribe metolazone 5 mg 60 minutes prior to morning dose of torsemide as needed maximal 1 to 2-week, and 1 refill    She needs a BMP in 1 to 2 weeks  Appointment with one of the advanced practitioners in the next couple weeks if at all possible

## 2025-02-04 NOTE — TELEPHONE ENCOUNTER
Patient called in asking if Dr Huang can prescribe her a few metolazone.  She said that she is having a procedure on her spine next week and said that she is having some increased swelling in her legs and abdomen. She said that last time she took the metolazone it really helped.  She reports that she had to drive to [facility] yesterday for an appointment and was weighed at the appointment and she was 147 lbs. She also reports that she did skip her water pills yesterday before the car ride.  Usually takes spironolactone 150 mg BID and torsemide 100 mg BID.  She uses [facility] pharmacy.  Please advise and call patient.

## 2025-03-13 ENCOUNTER — TELEPHONE (OUTPATIENT)
Age: 55
End: 2025-03-13

## 2025-03-13 DIAGNOSIS — N18.32 STAGE 3B CHRONIC KIDNEY DISEASE (HCC): Primary | ICD-10-CM

## 2025-03-13 RX ORDER — TORSEMIDE 100 MG/1
100 TABLET ORAL 2 TIMES DAILY
Qty: 60 TABLET | Refills: 5 | Status: SHIPPED | OUTPATIENT
Start: 2025-03-13

## 2025-03-13 NOTE — TELEPHONE ENCOUNTER
"Spoke with patient and confirmed she is taking torsemide 100 mg twice a day.  She said she would prefer not to switch down to 80 mg twice a day because if she goes any lower in her dose she \"blows up.\"  She said that the pharmacy could do 100 mg tablets and she would prefer to have those over taking the 20 mg tablets.  She will go for lab work as ordered.  "

## 2025-03-13 NOTE — TELEPHONE ENCOUNTER
[facility] pharmacy called stating the patients order is for Torsemide 20 mg take 5 tablets(100 mg) twice daily. She states the patient gets confused and does not like taking 10 pills a day. They have Torsemide 100 mg tablets available. Pharmacist asking if the doctor can send updated script for Torsemide 100 mg twice daily.  Please advise

## 2025-03-13 NOTE — TELEPHONE ENCOUNTER
Patient calling in stating stating she took her last 2 Torsemide pills yesterday, and [facility] pharmacy is telling her they will not have Torsemide in stock until Saturday at the earliest. States she has edema from kneecap down, non-pitting. Denies weeping from legs, sob, or resp distress. Reports she is taking Spironolactone 50mg- 3 tabs BID. States she has 2 Metolazone 5mg tablets left- she attempted to get a refill of medication but [facility] states she cannot refill until next Saturday. Patient is asking for direction on what she can do to help edema until Torsemide is in stock. I advised she could contact a local pharmacy to see if they have medication available- states she would prefer not to do this because its to far for her. She is asking if a refill of Metolazone can be called in and if she should take that to maintain edema while waiting for Torsemide? Advised patient to elevate legs while at rest to help edema subside.  Please advise, patient is requesting a call back. Thank you

## 2025-03-18 LAB
ANION GAP SERPL CALCULATED.3IONS-SCNC: 9 MMOL/L (ref 3–11)
BUN SERPL-MCNC: 37 MG/DL (ref 7–25)
CALCIUM SERPL-MCNC: 11.2 MG/DL (ref 8.5–10.5)
CHLORIDE SERPL-SCNC: 92 MMOL/L (ref 100–109)
CO2 SERPL-SCNC: 31 MMOL/L (ref 21–31)
CREAT SERPL-MCNC: 1.39 MG/DL (ref 0.4–1.1)
CYTOLOGY CMNT CVX/VAG CYTO-IMP: ABNORMAL
GFR/BSA.PRED SERPLBLD CYS-BASED-ARV: 45 ML/MIN/{1.73_M2}
GLUCOSE SERPL-MCNC: 134 MG/DL (ref 65–99)
MAGNESIUM SERPL-MCNC: 1.9 MG/DL (ref 1.4–2.2)
POTASSIUM SERPL-SCNC: 3.8 MMOL/L (ref 3.5–5.2)
SODIUM SERPL-SCNC: 132 MMOL/L (ref 135–145)

## 2025-03-20 ENCOUNTER — TELEPHONE (OUTPATIENT)
Age: 55
End: 2025-03-20

## 2025-03-20 NOTE — TELEPHONE ENCOUNTER
FYI- Patient wanted to make provider aware she is admitted to [facility] due to SOB, confusion and hallucinations.

## 2025-03-24 NOTE — ASSESSMENT & PLAN NOTE
See above  Orders:    Basic metabolic panel; Future    CBC; Future    Ferritin; Future    TIBC Panel (incl. Iron, TIBC, % Iron Saturation); Future    Magnesium; Future    Lipid Panel with Direct LDL reflex; Future    Protein / creatinine ratio, urine    PTH, intact; Future    Basic metabolic panel; Future    CBC; Future    TIBC Panel (incl. Iron, TIBC, % Iron Saturation); Future    Ferritin; Future    Phosphorus; Future    Magnesium; Future    Protein / creatinine ratio, urine; Future    PTH, intact; Future    Vitamin D 25 hydroxy; Future

## 2025-03-24 NOTE — ASSESSMENT & PLAN NOTE
Seems appropriate low normal no changes  Orders:    Basic metabolic panel; Future    CBC; Future    Ferritin; Future    TIBC Panel (incl. Iron, TIBC, % Iron Saturation); Future    Magnesium; Future    Lipid Panel with Direct LDL reflex; Future    Protein / creatinine ratio, urine    PTH, intact; Future    Basic metabolic panel; Future    CBC; Future    TIBC Panel (incl. Iron, TIBC, % Iron Saturation); Future    Ferritin; Future    Phosphorus; Future    Magnesium; Future    Protein / creatinine ratio, urine; Future    PTH, intact; Future    Vitamin D 25 hydroxy; Future

## 2025-03-24 NOTE — ASSESSMENT & PLAN NOTE
At baseline no change in treatment continue to monitor with current diuretics    Orders:    Basic metabolic panel; Future    CBC; Future    Ferritin; Future    TIBC Panel (incl. Iron, TIBC, % Iron Saturation); Future    Magnesium; Future    Lipid Panel with Direct LDL reflex; Future    Protein / creatinine ratio, urine    PTH, intact; Future    Basic metabolic panel; Future    CBC; Future    TIBC Panel (incl. Iron, TIBC, % Iron Saturation); Future    Ferritin; Future    Phosphorus; Future    Magnesium; Future    Protein / creatinine ratio, urine; Future    PTH, intact; Future    Vitamin D 25 hydroxy; Future

## 2025-03-24 NOTE — ASSESSMENT & PLAN NOTE
To obtain along with iron studies  Orders:    Basic metabolic panel; Future    CBC; Future    Ferritin; Future    TIBC Panel (incl. Iron, TIBC, % Iron Saturation); Future    Magnesium; Future    Lipid Panel with Direct LDL reflex; Future    Protein / creatinine ratio, urine    PTH, intact; Future    Basic metabolic panel; Future    CBC; Future    TIBC Panel (incl. Iron, TIBC, % Iron Saturation); Future    Ferritin; Future    Phosphorus; Future    Magnesium; Future    Protein / creatinine ratio, urine; Future    PTH, intact; Future    Vitamin D 25 hydroxy; Future
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
## 2025-03-24 NOTE — ASSESSMENT & PLAN NOTE
Low goal to consider small amount of salt to meals  Orders:    Basic metabolic panel; Future    CBC; Future    Ferritin; Future    TIBC Panel (incl. Iron, TIBC, % Iron Saturation); Future    Magnesium; Future    Lipid Panel with Direct LDL reflex; Future    Protein / creatinine ratio, urine    PTH, intact; Future    Basic metabolic panel; Future    CBC; Future    TIBC Panel (incl. Iron, TIBC, % Iron Saturation); Future    Ferritin; Future    Phosphorus; Future    Magnesium; Future    Protein / creatinine ratio, urine; Future    PTH, intact; Future    Vitamin D 25 hydroxy; Future

## 2025-04-02 ENCOUNTER — TELEPHONE (OUTPATIENT)
Age: 55
End: 2025-04-02

## 2025-04-03 ENCOUNTER — OFFICE VISIT (OUTPATIENT)
Dept: NEPHROLOGY | Facility: CLINIC | Age: 55
End: 2025-04-03

## 2025-04-03 VITALS — BODY MASS INDEX: 23.9 KG/M2 | HEIGHT: 64 IN | WEIGHT: 140 LBS

## 2025-04-03 DIAGNOSIS — D63.1 ANEMIA IN STAGE 3B CHRONIC KIDNEY DISEASE  (HCC): ICD-10-CM

## 2025-04-03 DIAGNOSIS — D50.8 OTHER IRON DEFICIENCY ANEMIA: ICD-10-CM

## 2025-04-03 DIAGNOSIS — E87.1 HYPONATREMIA: ICD-10-CM

## 2025-04-03 DIAGNOSIS — I12.9 PARENCHYMAL RENAL HYPERTENSION, STAGE 1 THROUGH STAGE 4 OR UNSPECIFIED CHRONIC KIDNEY DISEASE: Primary | ICD-10-CM

## 2025-04-03 DIAGNOSIS — N18.32 STAGE 3B CHRONIC KIDNEY DISEASE (HCC): ICD-10-CM

## 2025-04-03 DIAGNOSIS — N18.32 ANEMIA IN STAGE 3B CHRONIC KIDNEY DISEASE  (HCC): ICD-10-CM
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
## 2025-04-03 NOTE — PATIENT INSTRUCTIONS
Visit summary:  - Kidney labs look good  - Your potassium is doing well  - Your fluid looks good continue your current water medications  -Your blood pressure seems good as well    Your sodium is the only 1 level that is still lower than we would like    Recommend  - Modest fluid restriction 48 ounces if possible  - Add just a slight amount of salt to food  - Continue current water medication/diuretics as outlined  - We will follow-up labs at this time        1. Medication changes today:  No medication changes today    2.  General instructions:  Please see above    3.  Please go for  fasting  lab work for the next couple of days    4.  Please take 1 week a blood pressure readings prior to next appointment    AS FOLLOWS  MORNING AND EVENING, SITTING AND STANDING as follows:  TAKE THE MORNING READINGS BEFORE ANY MEDICATIONS AND WHEN YOU ARE RELAXED FOR SEVERAL MINUTES  TAKE THE EVENING READINGS:  BETWEEN 7-10 P.M.; PRIOR TO ANY MEDICATIONS; AT LEAST IN OUR  FROM DINNER; AND CERTAINLY AFTER RELAXING FOR A FEW MINUTES  PLEASE INCLUDE HEART RATE WITH YOUR BLOOD PRESSURE READINGS  When taking standing readings, keep your arm supported at heart level and not dangling  Make sure you are sitting with your back supported and feet on the ground and do not cross your legs or feet  Make sure you have not taken any coffee or caffeine products or exercised or smoke cigarettes at least 30 minutes before taking your blood pressure  Then please mail these readings into the office          5.  Follow-up in 4 months  Please bring in 1 week a blood pressure readings morning evening, sitting and standing is outlined above  PLEASE BRING AN YOUR BLOOD PRESSURE MACHINE TO CORRELATE WITH THE OFFICE MACHINE AT THIS NEXT SCHEDULED VISIT  Please go for fasting lab work 1-2 weeks prior to your appointment      6.  General non medical recommendations:      Avoid nonsteroidal anti-inflammatory drugs such as Naprosyn, ibuprofen, Aleve,  Advil, Celebrex, Meloxicam (Mobic) etc.  You can use Tylenol as needed if you do not have any liver condition to be concerned about    Avoid medications such as Sudafed or decongestants and antihistamines that contained the D component which is the decongestant.  You can take antihistamines without the decongestant or D component.      Try to exercise at least 30 minutes 3 days a week to begin with with an ultimate goal of 5 days a week for at least 30 minutes    Please do not drink more than 2 glasses of alcohol/wine on a daily basis as this may contribute to your high blood pressure.

## 2025-04-05 ENCOUNTER — TELEPHONE (OUTPATIENT)
Dept: OTHER | Facility: HOSPITAL | Age: 55
End: 2025-04-05

## 2025-04-05 ENCOUNTER — NURSE TRIAGE (OUTPATIENT)
Dept: OTHER | Facility: OTHER | Age: 55
End: 2025-04-05

## 2025-04-05 ENCOUNTER — TELEPHONE (OUTPATIENT)
Dept: OTHER | Facility: OTHER | Age: 55
End: 2025-04-05

## 2025-04-05 LAB
BUN SERPL-MCNC: 47 MG/DL (ref 7–25)
BUN/CREAT SERPL: 31 (CALC) (ref 6–22)
CALCIUM SERPL-MCNC: 9.5 MG/DL (ref 8.6–10.4)
CHLORIDE SERPL-SCNC: 90 MMOL/L (ref 98–110)
CHOLEST SERPL-MCNC: 182 MG/DL
CHOLEST/HDLC SERPL: 2.6 (CALC)
CO2 SERPL-SCNC: 27 MMOL/L (ref 20–32)
CREAT SERPL-MCNC: 1.51 MG/DL (ref 0.5–1.03)
CREAT UR-MCNC: 37 MG/DL (ref 20–275)
ERYTHROCYTE [DISTWIDTH] IN BLOOD BY AUTOMATED COUNT: 15.2 % (ref 11–15)
FERRITIN SERPL-MCNC: 19 NG/ML (ref 16–232)
GFR/BSA.PRED SERPLBLD CYS-BASED-ARV: 41 ML/MIN/1.73M2
GLUCOSE SERPL-MCNC: 115 MG/DL (ref 65–99)
HCT VFR BLD AUTO: 34.3 % (ref 35–45)
HDLC SERPL-MCNC: 69 MG/DL
HGB BLD-MCNC: 12 G/DL (ref 11.7–15.5)
IRON SATN MFR SERPL: 9 % (CALC) (ref 16–45)
IRON SERPL-MCNC: 31 MCG/DL (ref 45–160)
LDLC SERPL CALC-MCNC: 97 MG/DL (CALC)
MAGNESIUM SERPL-MCNC: 1.7 MG/DL (ref 1.5–2.5)
MCH RBC QN AUTO: 32.2 PG (ref 27–33)
MCHC RBC AUTO-ENTMCNC: 35 G/DL (ref 32–36)
MCV RBC AUTO: 92 FL (ref 80–100)
NONHDLC SERPL-MCNC: 113 MG/DL (CALC)
PLATELET # BLD AUTO: 96 THOUSAND/UL (ref 140–400)
PMV BLD REES-ECKER: 10.9 FL (ref 7.5–12.5)
POTASSIUM SERPL-SCNC: 2.6 MMOL/L (ref 3.5–5.3)
PROT UR-MCNC: 6 MG/DL (ref 5–24)
PROT/CREAT UR: 0.16 MG/MG CREAT (ref 0.02–0.18)
PROT/CREAT UR: 162 MG/G CREAT (ref 24–184)
PTH-INTACT SERPL-MCNC: 31 PG/ML (ref 16–77)
RBC # BLD AUTO: 3.73 MILLION/UL (ref 3.8–5.1)
SODIUM SERPL-SCNC: 128 MMOL/L (ref 135–146)
TIBC SERPL-MCNC: 341 MCG/DL (CALC) (ref 250–450)
TRIGL SERPL-MCNC: 70 MG/DL
WBC # BLD AUTO: 8.1 THOUSAND/UL (ref 3.8–10.8)

## 2025-04-05 NOTE — TELEPHONE ENCOUNTER
FOLLOW UP: Patient amenable to Emergency Department evaluation    REASON FOR CONVERSATION: Dizziness    SYMPTOMS: Shakiness, lightheadedness, low potassium    OTHER: None    DISPOSITION: Go to ED Now (or PCP Triage)    On call provider confirmed patient should go straight to the Emergency Department.

## 2025-04-05 NOTE — TELEPHONE ENCOUNTER
I called the patient regarding the low potassium I told her it is super important that she either goes to the emergency room or call us back immediately!  I left a message.    I also left a message with her significant other Juan Garcia to call immediately that it is an emergency!    I also called the sister Rosario to have the patient call immediately

## 2025-04-05 NOTE — TELEPHONE ENCOUNTER
Lab Result: Potassium 2.6   Date/Time Drawn: 4/4 2337 am   Ordering Provider: Dr. Grant Huang   Lab Personnel's Name: [facility]: [facility]        Read back to the lab as documented above     [x]     Secure Chat message sent to on-call provider  [x]     Provider confirmed receipt of message     [x]

## 2025-04-05 NOTE — TELEPHONE ENCOUNTER
"Regarding: lightheadedness/ shaky/ low potassium  ----- Message from Eliana WALL sent at 4/5/2025  9:20 AM EDT -----  Patient stated, \"I received a call from my Nephrologist and I am returning call about my potassium being low. I am just feeling lightheadedness and shaky,\"    "

## 2025-04-05 NOTE — TELEPHONE ENCOUNTER
"Reason for Disposition  • Patient sounds very sick or weak to the triager    Answer Assessment - Initial Assessment Questions  1. DESCRIPTION: \"Describe your dizziness.\"       Patient states she had mass removed from her chin two nights ago; felt like she lost her balance and almost fell. Her potassium is low. Feels it is hard to catch her breath and insides are shaky    2. LIGHTHEADED: \"Do you feel lightheaded?\" (e.g., somewhat faint, woozy, weak upon standing)        Lightheaded, shaky      3. VERTIGO: \"Do you feel like either you or the room is spinning or tilting?\" (i.e., vertigo)    Denies        4. SEVERITY: \"How bad is it?\"  \"Do you feel like you are going to faint?\" \"Can you stand and walk?\"        Denies    5. ONSET:  \"When did the dizziness begin?\"        Thursday 4/3    6. AGGRAVATING FACTORS: \"Does anything make it worse?\" (e.g., standing, change in head position)        Denies    7. HEART RATE: \"Can you tell me your heart rate?\" \"How many beats in 15 seconds?\"  (Note: Not all patients can do this.)          Denies    8. CAUSE: \"What do you think is causing the dizziness?\" (e.g., decreased fluids or food, diarrhea, emotional distress, heat exposure, new medicine, sudden standing, vomiting; unknown)        Low potassium    9. RECURRENT SYMPTOM: \"Have you had dizziness before?\" If Yes, ask: \"When was the last time?\" \"What happened that time?\"        10. OTHER SYMPTOMS: \"Do you have any other symptoms?\" (e.g., fever, chest pain, vomiting, diarrhea, bleeding)          Difficulty putting words together, feels shaky    11. PREGNANCY: \"Is there any chance you are pregnant?\" \"When was your last menstrual period?\"        N/A    Protocols used: Dizziness - Lightheadedness-Adult-AH    "

## 2025-04-08 ENCOUNTER — TELEPHONE (OUTPATIENT)
Age: 55
End: 2025-04-08

## 2025-04-08 DIAGNOSIS — E87.6 HYPOKALEMIA: Primary | ICD-10-CM

## 2025-04-08 DIAGNOSIS — E87.1 HYPONATREMIA: ICD-10-CM

## 2025-04-08 DIAGNOSIS — N18.32 STAGE 3B CHRONIC KIDNEY DISEASE (HCC): ICD-10-CM

## 2025-04-08 NOTE — TELEPHONE ENCOUNTER
Patient asking that labs be faxed to [facility] in [place] fax number is [phone]. Labs faxed via epic.

## 2025-04-08 NOTE — TELEPHONE ENCOUNTER
Patient states she was in the ED on 4-5 for hyponatremia and hypokalemia. Patient asking if provider can review ED visit and advise. Thank you.

## 2025-04-09 NOTE — TELEPHONE ENCOUNTER
Patient calling to confirm her labs were sent [facility]. I informed her they were and she is going for her labs today.

## 2025-04-10 ENCOUNTER — RESULTS FOLLOW-UP (OUTPATIENT)
Dept: NEPHROLOGY | Facility: CLINIC | Age: 55
End: 2025-04-10

## 2025-04-10 DIAGNOSIS — N18.32 STAGE 3B CHRONIC KIDNEY DISEASE (HCC): ICD-10-CM

## 2025-04-10 DIAGNOSIS — E87.1 HYPONATREMIA: Primary | ICD-10-CM

## 2025-04-10 DIAGNOSIS — E87.6 HYPOKALEMIA: ICD-10-CM

## 2025-04-10 LAB
BUN SERPL-MCNC: 31 MG/DL (ref 7–25)
BUN/CREAT SERPL: 28 (CALC) (ref 6–22)
CALCIUM SERPL-MCNC: 8.6 MG/DL (ref 8.6–10.4)
CHLORIDE SERPL-SCNC: 100 MMOL/L (ref 98–110)
CO2 SERPL-SCNC: 25 MMOL/L (ref 20–32)
CREAT SERPL-MCNC: 1.11 MG/DL (ref 0.5–1.03)
GFR/BSA.PRED SERPLBLD CYS-BASED-ARV: 59 ML/MIN/1.73M2
GLUCOSE SERPL-MCNC: 100 MG/DL (ref 65–99)
MAGNESIUM SERPL-MCNC: 1.6 MG/DL (ref 1.5–2.5)
POTASSIUM SERPL-SCNC: 3.4 MMOL/L (ref 3.5–5.3)
SODIUM SERPL-SCNC: 132 MMOL/L (ref 135–146)

## 2025-04-10 NOTE — TELEPHONE ENCOUNTER
Lab scripts mailed to patient    ----- Message from Grant Huang MD sent at 4/10/2025  7:06 AM EDT -----  Labs are much better!  Increase Micro-K 20 mill equivalents 3 times a day or 30 mill equivalents twice a day per her preference  Repeat a BMP and magnesium in 1 to 2 weeks  ----- Message -----  From: Kezia Cabrera Lab Results In  Sent: 4/10/2025   5:00 AM EDT  To: Grant Huang MD

## 2025-04-11 NOTE — TELEPHONE ENCOUNTER
LM  on patient's VM that iron studies were just done 4/4/25.  Will check with Dr. Huang for any further recommendations.

## 2025-04-11 NOTE — TELEPHONE ENCOUNTER
Patient was updated on lab results. She will increase the potassium to 60meq daily. She is asking if her iron levels can get checked. She said she is always tired and falling asleep. She said she used to get iron infusions. Please advise.

## 2025-04-14 NOTE — RESULT ENCOUNTER NOTE
Spoke with patient about the following, she expressed understanding and thanked us for the call:    Iron every other day over-the-counter

## 2025-04-18 LAB
BUN SERPL-MCNC: 36 MG/DL (ref 7–25)
BUN/CREAT SERPL: 32 (CALC) (ref 6–22)
CALCIUM SERPL-MCNC: 8.7 MG/DL (ref 8.6–10.4)
CHLORIDE SERPL-SCNC: 97 MMOL/L (ref 98–110)
CO2 SERPL-SCNC: 24 MMOL/L (ref 20–32)
CREAT SERPL-MCNC: 1.14 MG/DL (ref 0.5–1.03)
GFR/BSA.PRED SERPLBLD CYS-BASED-ARV: 58 ML/MIN/1.73M2
GLUCOSE SERPL-MCNC: 110 MG/DL (ref 65–139)
MAGNESIUM SERPL-MCNC: 1.4 MG/DL (ref 1.5–2.5)
POTASSIUM SERPL-SCNC: 3.8 MMOL/L (ref 3.5–5.3)
SODIUM SERPL-SCNC: 128 MMOL/L (ref 135–146)

## 2025-04-18 RX ORDER — SODIUM CHLORIDE 1 G/1
1 TABLET ORAL 2 TIMES DAILY WITH MEALS
Qty: 60 TABLET | Refills: 5 | Status: SHIPPED | OUTPATIENT
Start: 2025-04-18

## 2025-04-18 NOTE — TELEPHONE ENCOUNTER
Spoke with patient about the following, she expressed understanding and thanked us for the call:    ----- Message from Grant Huang MD sent at 4/18/2025  8:00 AM EDT -----  Labs are better the only thing to address is as follows  1.  Sodium low reinforced the importance of fluid restriction 48 ounces  2.  I will place her on sodium chloride 1 g twice a day  3.  Magnesium low increase magnesium oxide 400 mg twice a day watch for diarrhea  4.   Repeat a BMP/magnesium in about 2 weeks  ----- Message -----  From: Kezia Cabrera Lab Results In  Sent: 4/18/2025   2:30 AM EDT  To: Grant Huang MD

## 2025-05-27 ENCOUNTER — TELEPHONE (OUTPATIENT)
Age: 55
End: 2025-05-27

## 2025-05-27 NOTE — TELEPHONE ENCOUNTER
Patient called asking if labs are ordered. Made aware magnesium and BMP are ordered. Patient verbalized understanding.

## 2025-05-29 LAB
BUN SERPL-MCNC: 56 MG/DL (ref 7–25)
BUN/CREAT SERPL: 38 (CALC) (ref 6–22)
CALCIUM SERPL-MCNC: 9.8 MG/DL (ref 8.6–10.4)
CHLORIDE SERPL-SCNC: 91 MMOL/L (ref 98–110)
CO2 SERPL-SCNC: 29 MMOL/L (ref 20–32)
CREAT SERPL-MCNC: 1.49 MG/DL (ref 0.5–1.03)
GFR/BSA.PRED SERPLBLD CYS-BASED-ARV: 42 ML/MIN/1.73M2
GLUCOSE SERPL-MCNC: 98 MG/DL (ref 65–99)
MAGNESIUM SERPL-MCNC: 1.7 MG/DL (ref 1.5–2.5)
POTASSIUM SERPL-SCNC: 3.3 MMOL/L (ref 3.5–5.3)
SODIUM SERPL-SCNC: 129 MMOL/L (ref 135–146)

## 2025-05-30 ENCOUNTER — TELEPHONE (OUTPATIENT)
Dept: NEPHROLOGY | Facility: HOSPITAL | Age: 55
End: 2025-05-30

## 2025-05-30 DIAGNOSIS — E87.6 HYPOKALEMIA: ICD-10-CM

## 2025-05-30 DIAGNOSIS — E87.1 HYPONATREMIA: Primary | ICD-10-CM

## 2025-05-30 DIAGNOSIS — N18.32 STAGE 3B CHRONIC KIDNEY DISEASE (HCC): ICD-10-CM

## 2025-05-30 RX ORDER — POTASSIUM CHLORIDE 750 MG/1
40 CAPSULE, EXTENDED RELEASE ORAL 2 TIMES DAILY
Qty: 240 CAPSULE | Refills: 5 | Status: SHIPPED | OUTPATIENT
Start: 2025-05-30

## 2025-05-30 NOTE — TELEPHONE ENCOUNTER
Patient calling back, relayed the above message. Patient will follow instructions but she did state that she is currently taking potassium chloride 10 MEQ 3 tabs in AM and 3 tabs in PM. What should she increase to? Please leave voicemail if she is unable to answer.  Please advise

## 2025-05-30 NOTE — TELEPHONE ENCOUNTER
LM for patient asking her to call back to go over the following:    Couple of things  1.  Creatinine slightly higher than usual  2.  Potassium low and sodium low     Recommend  1.  Try to decrease torsemide to 100 mg the morning 50 mg in the evening perhaps alternating with 100 mg twice a day watch swelling weight gains  2.  Increase potassium supplement I believe she is taking 20 mill equivalents twice a day to 40 mill equivalents twice a day for 3 days then 30 mill equivalents twice a day thereafter  3.  Make sure she is on sodium chloride tablets I would then increase it to 1 g 3 times a day  4.  Repeat a BMP and magnesium 1 week later

## 2025-05-30 NOTE — TELEPHONE ENCOUNTER
Spoke with patient about the following, she expressed understanding and thanked us for the call:    Increased to 40 mill equivalents twice a day indefinitely

## 2025-06-09 DIAGNOSIS — N18.32 ANEMIA IN STAGE 3B CHRONIC KIDNEY DISEASE  (HCC): Primary | ICD-10-CM

## 2025-06-09 DIAGNOSIS — D63.1 ANEMIA IN STAGE 3B CHRONIC KIDNEY DISEASE  (HCC): Primary | ICD-10-CM

## 2025-06-09 LAB
ANION GAP SERPL CALCULATED.3IONS-SCNC: 12 MMOL/L (ref 3–11)
BUN SERPL-MCNC: 56 MG/DL (ref 7–25)
CALCIUM SERPL-MCNC: 11 MG/DL (ref 8.5–10.5)
CHLORIDE SERPL-SCNC: 87 MMOL/L (ref 100–109)
CO2 SERPL-SCNC: 28 MMOL/L (ref 21–31)
CREAT SERPL-MCNC: 1.87 MG/DL (ref 0.4–1.1)
CYTOLOGY CMNT CVX/VAG CYTO-IMP: ABNORMAL
FERRITIN SERPL-MCNC: 114 NG/ML (ref 11–306.8)
GFR/BSA.PRED SERPLBLD CYS-BASED-ARV: 32 ML/MIN/{1.73_M2}
GLUCOSE SERPL-MCNC: 113 MG/DL (ref 65–99)
IRON SATN MFR SERPL: 94 % (ref 20–50)
IRON SERPL-MCNC: 263 UG/DL (ref 50–212)
MAGNESIUM SERPL-MCNC: 1.8 MG/DL (ref 1.4–2.2)
POTASSIUM SERPL-SCNC: 3.8 MMOL/L (ref 3.5–5.2)
SODIUM SERPL-SCNC: 127 MMOL/L (ref 135–145)
TIBC SERPL-MCNC: 280 UG/DL (ref 260–430)
TRANSFERRIN SERPL-MCNC: 200 MG/DL (ref 203–362)

## 2025-06-20 ENCOUNTER — NURSE TRIAGE (OUTPATIENT)
Age: 55
End: 2025-06-20

## 2025-06-20 DIAGNOSIS — N18.32 STAGE 3B CHRONIC KIDNEY DISEASE (HCC): Primary | ICD-10-CM

## 2025-06-20 DIAGNOSIS — R60.0 LOCALIZED EDEMA: ICD-10-CM

## 2025-06-20 RX ORDER — METOLAZONE 5 MG/1
TABLET ORAL
Qty: 24 TABLET | Refills: 1 | Status: SHIPPED | OUTPATIENT
Start: 2025-06-20

## 2025-06-20 NOTE — TELEPHONE ENCOUNTER
LM for patient about the following, asked her to call back if she has any questions:    She can take metolazone 5 mg an hour before the morning dose of torsemide but only 1 to 2 days if her swelling worsens or shortness of breath she must go to the emergency room  BMP/magnesium in 1 week

## 2025-06-20 NOTE — TELEPHONE ENCOUNTER
Patient calling again states pharmacy said it is too early to fill metolazone and she can't fill it until 6-28. Please advise, thank you.

## 2025-06-20 NOTE — TELEPHONE ENCOUNTER
Left reminder message in regards to the mammogram and that we have a few spots available for mammo bus. Patient called back stating she just took off her compression stockings and on her right side it is very bruised, painful and hard in one area. She rates her pain 9/10. Instructed her to go to urgent care or ED for evaluation of her right leg. She will go to the [facility] care right now.  SRIRAM

## 2025-06-20 NOTE — TELEPHONE ENCOUNTER
Medication: metolazone 5 mg     Dose/Frequency: as directed    Quantity: 30 day supply    Pharmacy: [facility]    Office:   [] PCP/Provider -   [x] Speciality/Provider - neph    Does the patient have enough for 3 days?   [] Yes   [x] No - Send as HP to POD

## 2025-06-20 NOTE — TELEPHONE ENCOUNTER
LM for patient letting her know that she would need to pay out of pocket for the medication as there isn't anything we could do to get the medication for her sooner since there wasn't a change in dosage.  Advised if her legs are worse, she should be seen in urgent care or ED as previously advised.

## 2025-07-10 ENCOUNTER — TELEPHONE (OUTPATIENT)
Age: 55
End: 2025-07-10

## 2025-07-10 NOTE — TELEPHONE ENCOUNTER
LM for patient about the following, asked her to call back if she has any questions:    The most she should be taking is 1-2 times a week let her know that is maximal she cannot take more than that

## 2025-07-10 NOTE — TELEPHONE ENCOUNTER
Patient was updated on providers message. She said she may have took an extra 1 pill here or there. She has 2 left and she took 1 today and will take another 1 tomorrow. She said the pharmacy called her to let her know the script is ready to be picked up and she will get that tomorrow. She also said she will go soon for her lab work. She said she doesn't think the 2 pills are doing anything so he may take a trip to the ED.

## 2025-07-10 NOTE — TELEPHONE ENCOUNTER
Dr. Huang    Metolazone    Pharmacist called to alert provider amount of Metolazone pt is taking.    Script refill hx:  07/10/25 request  06/25/25 06/05/25 05/13/25  Prior was normal frequency

## 2025-07-17 ENCOUNTER — TELEPHONE (OUTPATIENT)
Age: 55
End: 2025-07-17

## 2025-07-17 ENCOUNTER — DOCUMENTATION (OUTPATIENT)
Dept: OTHER | Facility: HOSPITAL | Age: 55
End: 2025-07-17

## 2025-07-17 DIAGNOSIS — E87.1 HYPONATREMIA: Primary | ICD-10-CM

## 2025-07-17 NOTE — TELEPHONE ENCOUNTER
On call was contacted via Epic ,Reyes Bahamonde MD advised pt to take a extra dose of sodium chloride 1 G for 3 days and have repeat labs on Monday. If pt is symptomatic , pt will have to go to ED.     Called pt to give her an update however no answer. Left VM to call back. Call back number was left in the message.

## 2025-07-17 NOTE — TELEPHONE ENCOUNTER
"Pt called inquiring about her sodium Lab results that was complete at [facility] yesterday. Pt reported that it trends low and confirmed that she takes sodium chloride 1 g twice daily, reporting that sometimes she forgets if she took a dose and would take a third one. Pt reported that she does have some dizziness and feels somewhat \"off\". Recommended for pt to go to ED however pt declined at this time reporting that she doesn't feel like it is urgent and will go if she get progressively worst. Please advise.     Pharmacy is [facility] in [place].   "

## 2025-07-17 NOTE — TELEPHONE ENCOUNTER
Patient called asking if we can fax her labs to [facility] in [facility]. Labs faxed via EPIC to [phone]

## 2025-07-22 ENCOUNTER — RESULTS FOLLOW-UP (OUTPATIENT)
Dept: NEPHROLOGY | Facility: CLINIC | Age: 55
End: 2025-07-22

## 2025-07-22 ENCOUNTER — TELEPHONE (OUTPATIENT)
Dept: NEPHROLOGY | Facility: CLINIC | Age: 55
End: 2025-07-22

## 2025-07-22 LAB
BUN SERPL-MCNC: 35 MG/DL (ref 7–25)
BUN/CREAT SERPL: 27 (CALC) (ref 6–22)
CA-I SERPL-MCNC: 4.8 MG/DL (ref 4.7–5.5)
CALCIUM SERPL-MCNC: 8.7 MG/DL (ref 8.6–10.4)
CHLORIDE SERPL-SCNC: 95 MMOL/L (ref 98–110)
CO2 SERPL-SCNC: 23 MMOL/L (ref 20–32)
CREAT SERPL-MCNC: 1.28 MG/DL (ref 0.5–1.03)
GFR/BSA.PRED SERPLBLD CYS-BASED-ARV: 50 ML/MIN/1.73M2
GLUCOSE SERPL-MCNC: 97 MG/DL (ref 65–139)
POTASSIUM SERPL-SCNC: 4.7 MMOL/L (ref 3.5–5.3)
PTH-INTACT SERPL-MCNC: 63 PG/ML (ref 16–77)
SODIUM SERPL-SCNC: 124 MMOL/L (ref 135–146)

## 2025-07-22 NOTE — TELEPHONE ENCOUNTER
Called the patient because of the very low sodium recommending he goes to the hospital  I left a message on her answering machine to go to the hospital and if nothing else make sure to call the office back    I also called Juan bita other left a message on his machine to have her go to the hospital certainly with any questions call the office    Finally, I called the sister Rosario I spoke with her told her to get in touch with her sister and she will try to tell her to go to the hospital or certainly the very least call the office

## 2025-07-22 NOTE — TELEPHONE ENCOUNTER
Patient was updated on results. She said she is unsure if she is going to go to the hospital. I reiterated to her that her sodium level is dangerously low and it can't be treated as outpatient. Informed her she can get seizures from the low sodium level. At time she said she nods off while standing and walking and catches herself. She said she isn't sure. She doesn't want to go by ambulance. She said one time she was this low the ED released her and another time they kept her. She said she will call us back in 5 minutes to let us know if she is going to go or not.

## 2025-07-22 NOTE — TELEPHONE ENCOUNTER
----- Message from Grant Huang MD sent at 7/22/2025  7:04 AM EDT -----  Sodium level dangerously low she should really go to the hospital for this!  Too low to treat as an outpatient.  She can get seizures etc.  Please let her do so let me know how that turns out  ----- Message -----  From: [facility] Lab Results In  Sent: 7/22/2025  12:45 AM EDT  To: Grant Huang MD

## 2025-07-22 NOTE — TELEPHONE ENCOUNTER
Health Maintenance Summary     Topic Due On Due Status Completed On Postpone Until Reason    IMMUNIZATION - IPV  Completed Nov 26, 2008      IMMUNIZATION - MMR  Completed Nov 26, 2008      IMMUNIZATION - VARICELLA  Completed Nov 26, 2008      IMMUNIZATION - HEPATITIS B  Completed Oct 8, 2004      IMMUNIZATION - HEPATITIS A  Completed Mar 5, 2016      IMMUNIZATION - HPV  Completed Mar 5, 2016      IMMUNIZATION - MENINGITIS Apr 6, 2020 Not Due Jun 22, 2015      Immunization - TDAP Pregnancy  Hidden       IMMUNIZATION - DTaP/Tdap/Td Jun 22, 2025 Not Due Jun 22, 2015      Immunization-Influenza Sep 1, 2017 Postponed  Apr 1, 2018 Patient Refused          Patient is due for topics as listed above, she wishes to declines flu shot at this time . Patient called back to let Dr. Huang know she will be going to the ED today within the next few hours. She will be going to [facility].  SRIRAM

## 2025-07-28 ENCOUNTER — TELEPHONE (OUTPATIENT)
Dept: NEPHROLOGY | Facility: CLINIC | Age: 55
End: 2025-07-28

## 2025-07-28 DIAGNOSIS — N18.32 STAGE 3B CHRONIC KIDNEY DISEASE (HCC): Primary | ICD-10-CM

## 2025-07-28 DIAGNOSIS — E87.1 HYPONATREMIA: ICD-10-CM

## 2025-07-28 DIAGNOSIS — E87.6 HYPOKALEMIA: ICD-10-CM

## 2025-07-29 ENCOUNTER — TELEPHONE (OUTPATIENT)
Dept: NEPHROLOGY | Facility: CLINIC | Age: 55
End: 2025-07-29

## 2025-07-29 DIAGNOSIS — N18.32 STAGE 3B CHRONIC KIDNEY DISEASE (HCC): Primary | ICD-10-CM

## 2025-07-29 DIAGNOSIS — E87.6 HYPOKALEMIA: ICD-10-CM

## 2025-07-31 ENCOUNTER — RESULTS FOLLOW-UP (OUTPATIENT)
Dept: NEPHROLOGY | Facility: CLINIC | Age: 55
End: 2025-07-31

## 2025-07-31 LAB
ANION GAP SERPL CALCULATED.3IONS-SCNC: 13 MMOL/L (ref 3–11)
BUN SERPL-MCNC: 39 MG/DL (ref 7–25)
CALCIUM SERPL-MCNC: 9.5 MG/DL (ref 8.5–10.5)
CHLORIDE SERPL-SCNC: 86 MMOL/L (ref 100–109)
CO2 SERPL-SCNC: 27 MMOL/L (ref 21–31)
CREAT SERPL-MCNC: 1.3 MG/DL (ref 0.4–1.1)
CYTOLOGY CMNT CVX/VAG CYTO-IMP: ABNORMAL
GFR/BSA.PRED SERPLBLD CYS-BASED-ARV: 49 ML/MIN/{1.73_M2}
GLUCOSE SERPL-MCNC: 116 MG/DL (ref 65–99)
MAGNESIUM SERPL-MCNC: 1.5 MG/DL (ref 1.4–2.2)
POTASSIUM SERPL-SCNC: 2.9 MMOL/L (ref 3.5–5.2)
SODIUM SERPL-SCNC: 126 MMOL/L (ref 135–145)

## 2025-08-01 ENCOUNTER — OFFICE VISIT (OUTPATIENT)
Dept: NEPHROLOGY | Facility: CLINIC | Age: 55
End: 2025-08-01
Payer: MEDICARE

## 2025-08-01 VITALS — HEIGHT: 64 IN | BODY MASS INDEX: 22.2 KG/M2 | WEIGHT: 130 LBS

## 2025-08-01 DIAGNOSIS — N18.32 STAGE 3B CHRONIC KIDNEY DISEASE (HCC): ICD-10-CM

## 2025-08-01 DIAGNOSIS — N18.32 ANEMIA IN STAGE 3B CHRONIC KIDNEY DISEASE  (HCC): ICD-10-CM

## 2025-08-01 DIAGNOSIS — D63.1 ANEMIA IN STAGE 3B CHRONIC KIDNEY DISEASE  (HCC): ICD-10-CM

## 2025-08-01 DIAGNOSIS — I12.9 PARENCHYMAL RENAL HYPERTENSION, STAGE 1 THROUGH STAGE 4 OR UNSPECIFIED CHRONIC KIDNEY DISEASE: Primary | ICD-10-CM

## 2025-08-01 DIAGNOSIS — E87.1 HYPONATREMIA: ICD-10-CM

## 2025-08-01 DIAGNOSIS — E87.6 HYPOKALEMIA: ICD-10-CM

## 2025-08-01 PROCEDURE — 99214 OFFICE O/P EST MOD 30 MIN: CPT | Performed by: INTERNAL MEDICINE

## 2025-08-01 RX ORDER — SENNOSIDES 8.6 MG/1
1 TABLET ORAL DAILY PRN
COMMUNITY

## 2025-08-01 RX ORDER — FUROSEMIDE 80 MG/1
80 TABLET ORAL 3 TIMES DAILY
COMMUNITY

## 2025-08-04 LAB
ANION GAP SERPL CALCULATED.3IONS-SCNC: 6 MMOL/L (ref 3–11)
BUN SERPL-MCNC: 30 MG/DL (ref 7–25)
CALCIUM SERPL-MCNC: 7.9 MG/DL (ref 8.5–10.5)
CHLORIDE SERPL-SCNC: 103 MMOL/L (ref 100–109)
CO2 SERPL-SCNC: 23 MMOL/L (ref 21–31)
CREAT SERPL-MCNC: 1.07 MG/DL (ref 0.4–1.1)
CYTOLOGY CMNT CVX/VAG CYTO-IMP: ABNORMAL
GFR/BSA.PRED SERPLBLD CYS-BASED-ARV: 62 ML/MIN/{1.73_M2}
GLUCOSE SERPL-MCNC: 89 MG/DL (ref 65–99)
MAGNESIUM SERPL-MCNC: 1.5 MG/DL (ref 1.4–2.2)
POTASSIUM SERPL-SCNC: 4.7 MMOL/L (ref 3.5–5.2)
SODIUM SERPL-SCNC: 132 MMOL/L (ref 135–145)

## 2025-08-19 ENCOUNTER — TELEPHONE (OUTPATIENT)
Age: 55
End: 2025-08-19

## 2025-08-19 ENCOUNTER — NURSE TRIAGE (OUTPATIENT)
Age: 55
End: 2025-08-19

## 2025-08-19 DIAGNOSIS — R60.0 LOCALIZED EDEMA: ICD-10-CM

## 2025-08-19 RX ORDER — METOLAZONE 5 MG/1
TABLET ORAL
Qty: 24 TABLET | Refills: 1 | Status: SHIPPED | OUTPATIENT
Start: 2025-08-19